# Patient Record
Sex: MALE | Race: BLACK OR AFRICAN AMERICAN | NOT HISPANIC OR LATINO | URBAN - METROPOLITAN AREA
[De-identification: names, ages, dates, MRNs, and addresses within clinical notes are randomized per-mention and may not be internally consistent; named-entity substitution may affect disease eponyms.]

---

## 2018-10-01 ENCOUNTER — EMERGENCY (EMERGENCY)
Facility: HOSPITAL | Age: 43
LOS: 1 days | Discharge: HOME | End: 2018-10-01
Attending: EMERGENCY MEDICINE | Admitting: EMERGENCY MEDICINE
Payer: COMMERCIAL

## 2018-10-01 VITALS
HEART RATE: 86 BPM | WEIGHT: 268.96 LBS | DIASTOLIC BLOOD PRESSURE: 89 MMHG | TEMPERATURE: 99 F | RESPIRATION RATE: 18 BRPM | SYSTOLIC BLOOD PRESSURE: 135 MMHG | OXYGEN SATURATION: 99 %
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
## 2018-10-01 DIAGNOSIS — M25.511 PAIN IN RIGHT SHOULDER: ICD-10-CM

## 2018-10-01 DIAGNOSIS — Z88.8 ALLERGY STATUS TO OTHER DRUGS, MEDICAMENTS AND BIOLOGICAL SUBSTANCES STATUS: ICD-10-CM

## 2018-10-01 DIAGNOSIS — S43.421A SPRAIN OF RIGHT ROTATOR CUFF CAPSULE, INITIAL ENCOUNTER: ICD-10-CM

## 2018-10-01 PROCEDURE — 73030 X-RAY EXAM OF SHOULDER: CPT

## 2018-10-01 PROCEDURE — 73030 X-RAY EXAM OF SHOULDER: CPT | Mod: 26,RT

## 2018-10-01 PROCEDURE — 99283 EMERGENCY DEPT VISIT LOW MDM: CPT | Mod: 25

## 2018-10-01 PROCEDURE — 99283 EMERGENCY DEPT VISIT LOW MDM: CPT

## 2018-10-01 RX ORDER — IBUPROFEN 200 MG
600 TABLET ORAL ONCE
Qty: 0 | Refills: 0 | Status: DISCONTINUED | OUTPATIENT
Start: 2018-10-01 | End: 2018-10-05

## 2018-10-01 NOTE — ED PROVIDER NOTE - MEDICAL DECISION MAKING DETAILS
Pt with shoulder pain, HIGHEST suspician for rotator cuff strain, will xray to screen for fx / dislocation,  PLAN : NSAIDS, sling, rest , ortho f/u.   xray pending.

## 2018-10-01 NOTE — ED ADULT NURSE NOTE - OBJECTIVE STATEMENT
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

## 2018-10-01 NOTE — ED ADULT NURSE NOTE - NSIMPLEMENTINTERV_GEN_ALL_ED
Implemented All Universal Safety Interventions:  [place] to call system. Call bell, personal items and telephone within reach. Instruct patient to call for assistance. Room bathroom lighting operational. Non-slip footwear when patient is off stretcher. Physically safe environment: no spills, clutter or unnecessary equipment. Stretcher in lowest position, wheels locked, appropriate side rails in place.

## 2018-10-01 NOTE — ED PROVIDER NOTE - PHYSICAL EXAMINATION
symmetric shoulders, no deformities, tender to palpation at anterior shoulder, ROM: FROM with difficulty, passively and actively, sensation intact x 3 , motor intact, elbow and wrist also wnl, radial pulse intact, no clavicular or AC joint tenderness, pain at anterior shoulder with internal rotation.

## 2018-10-01 NOTE — ED PROVIDER NOTE - MUSCULOSKELETAL, MLM
Spine appears normal, range of motion is not limited, no muscle or joint tenderness EXCEPTION : see above

## 2018-10-01 NOTE — ED PROVIDER NOTE - OBJECTIVE STATEMENT
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  developed difficulty stirring soup, no radiation, pain constant but worse w movement, no numbness, no prior shoulder injury,

## 2018-10-01 NOTE — ED PROVIDER NOTE - CARE PROVIDER_API CALL
Nelson Frankel), Orthopaedic Surgery  [address] [place] [address]  Phone: [phone]  Fax: [phone]

## 2020-04-25 ENCOUNTER — MESSAGE (OUTPATIENT)
Age: 45
End: 2020-04-25

## 2020-08-15 ENCOUNTER — EMERGENCY (EMERGENCY)
Facility: HOSPITAL | Age: 45
LOS: 1 days | Discharge: HOME | End: 2020-08-15
Attending: EMERGENCY MEDICINE | Admitting: EMERGENCY MEDICINE
Payer: MEDICAID

## 2020-08-15 VITALS
HEIGHT: 74 IN | DIASTOLIC BLOOD PRESSURE: 87 MMHG | RESPIRATION RATE: 18 BRPM | HEART RATE: 95 BPM | OXYGEN SATURATION: 99 % | TEMPERATURE: 99 F | SYSTOLIC BLOOD PRESSURE: 149 MMHG | WEIGHT: 250 LBS

## 2020-08-15 DIAGNOSIS — R50.9 FEVER, UNSPECIFIED: ICD-10-CM

## 2020-08-15 DIAGNOSIS — Z88.8 ALLERGY STATUS TO OTHER DRUGS, MEDICAMENTS AND BIOLOGICAL SUBSTANCES: ICD-10-CM

## 2020-08-15 DIAGNOSIS — Z20.828 CONTACT WITH AND (SUSPECTED) EXPOSURE TO OTHER VIRAL COMMUNICABLE DISEASES: ICD-10-CM

## 2020-08-15 DIAGNOSIS — B34.9 VIRAL INFECTION, UNSPECIFIED: ICD-10-CM

## 2020-08-15 LAB — SARS-COV-2 RNA SPEC QL NAA+PROBE: SIGNIFICANT CHANGE UP

## 2020-08-15 PROCEDURE — 99284 EMERGENCY DEPT VISIT MOD MDM: CPT

## 2020-08-15 PROCEDURE — 99283 EMERGENCY DEPT VISIT LOW MDM: CPT

## 2020-08-15 PROCEDURE — 87635 SARS-COV-2 COVID-19 AMP PRB: CPT

## 2020-08-15 RX ORDER — ACETAMINOPHEN 500 MG
650 TABLET ORAL ONCE
Refills: 0 | Status: COMPLETED | OUTPATIENT
Start: 2020-08-15 | End: 2020-08-15

## 2020-08-15 RX ADMIN — Medication 650 MILLIGRAM(S): at 19:34

## 2020-08-15 NOTE — ED PROVIDER NOTE - NSFOLLOWUPINSTRUCTIONS_ED_ALL_ED_FT
Please see discharge instructions on COVID19, self quarantine, pending results of your testing.  Assume your test may be positive until you hear otherwise.  There is a follow up program that will contact your about results of your test negative or positive.  Please return to the ER if symptoms worsen, significant trouble breathing, or other concerns.    COVID-19 (Coronavirus Disease 2019)    WHAT YOU NEED TO KNOW:    COVID-19 is the disease caused by the 2019 novel (new) coronavirus. It was first found in individuals in a part of [place] in late 2019. The virus is spreading to other countries as infected persons travel. Coronaviruses generally cause respiratory (nose, throat, and lung) infections, such as a cold. They can also cause serious infections such as Middle East respiratory syndrome (MERS) and severe acute respiratory syndrome (SARS). The new virus is related to the SARS coronavirus, so it is officially named SARS coronavirus 2 (SARS-CoV-2).    DISCHARGE INSTRUCTIONS:    If you think you or someone you know may be infected: It is important for anyone who may be infected to get tested right away. Do the following to protect others:     If emergency care is needed, tell the  about the possible infection, or call ahead and tell the emergency department.    Call a healthcare provider to be seen in the office. Anyone who may be infected should not arrive without calling first. The provider will need to protect staff members and other patients.    The person who may be infected needs to wear a medical mask before getting medical care. The mask needs to stay on until the provider says to remove it.    Call your local emergency number (911 in the [place]) or go to the emergency department if: You have signs or symptoms of COVID-19, and any of the following is true:     You traveled within the last 14 days to an area where the virus is active or had close contact with someone who did.    You had close contact within the last 14 days with someone who has a confirmed infection.    Call your doctor if: You do not have signs or symptoms of COVID-19, but any of the following is true:     You traveled within the last 14 days to an area where the virus is active or had close contact with someone who did.    You had close contact within the last 14 days with someone who has a confirmed infection.    You have questions or concerns about your condition or care.    Medicines: You may need any of the following for mild symptoms:     Decongestants help reduce nasal congestion and help you breathe more easily. If you take decongestant pills, they may make you feel restless or cause problems with your sleep. Do not use decongestant sprays for more than a few days.    Cough suppressants help reduce coughing. Ask your healthcare provider which type of cough medicine is best for you.    Acetaminophen decreases pain and fever. It is available without a doctor's order. Ask how much to take and how often to take it. Follow directions. Read the labels of all other medicines you are using to see if they also contain acetaminophen, or ask your doctor or pharmacist. Acetaminophen can cause liver damage if not taken correctly. Do not use more than 4 grams (4,000 milligrams) total of acetaminophen in one day.     Take your medicine as directed. Contact your healthcare provider if you think your medicine is not helping or if you have side effects. Tell him or her if you are allergic to any medicine. Keep a list of the medicines, vitamins, and herbs you take. Include the amounts, and when and why you take them. Bring the list or the pill bottles to follow-up visits. Carry your medicine list with you in case of an emergency.    How the 2019 coronavirus spreads: The virus appears to spread quickly and easily. The following are ways the virus is thought to spread, but more information may be coming:     Droplets are the most common way all coronaviruses spread. The virus can travel in droplets that form when a person talks, coughs, or sneezes. Anyone who breathes in the virus or gets it in his or her eyes can become infected.    An infected person may be able to leave the virus on objects and surfaces. Another person can get the virus on his or her hands by touching the object or surface. Infection happens if the person then touches his or her eyes or mouth with unwashed hands. It is not yet known how long the virus can stay on an object or surface. Evidence shows it may be as long as 9 days at room temperature.    Person-to-person contact may spread the virus. For example, an infected person can spread the virus by shaking hands with someone. At this time, it does not appear that the virus can be passed to a baby during pregnancy or delivery. The virus also does not appear to spread during breastfeeding. If you are pregnant or breastfeeding, talk to your healthcare provider or obstetrician about any concerns you have.    An infected animal may be able to infect a person who touches it. This may happen at live markets or on a farm.    Prevent a 2019 coronavirus infection: Everyone should do the following to prevent getting or spreading the virus:     Wash your hands often. Use soap and water every time you wash your hands. Rub your soapy hands together, lacing your fingers. Use the fingers of one hand to scrub under the nails of the other hand. Wash for at least 20 seconds. Rinse with warm, running water for several seconds. Then dry your hands. Use germ-killing gel if soap and water are not available. Do not touch your eyes or mouth without washing your hands first. Handwashing    Cover a sneeze or cough. Use a tissue that covers your mouth and nose. Throw the tissue away in a trash can right away. Use the bend of your arm if a tissue is not available. Wash your hands well with soap and water or use a hand . Do not stand close to anyone who is sneezing or coughing.    Be careful around others. The best way to prevent infection is to avoid anyone who is infected, but this may be difficult. An infected person may be able to spread the virus before signs or symptoms begin. Until more is known, you may not want to shake hands with others. If you do shake hands, wash your hands or use hand  as soon as possible. You do not need to wear a medical mask if you are well and not caring for an infected person.    Ask about vaccines you may need. No vaccine is available for the new coronavirus. But any infection can affect your immune system. A weakened immune system makes you more vulnerable to the new coronavirus. Until a vaccine against the new virus is developed, do the following:   Get a flu vaccine as soon as recommended each year. The flu vaccine is available starting in September or October. Flu viruses change, so it is important to get a flu vaccine every year.    Talk to your healthcare provider about your vaccine history. Tell him or her if you did not get certain vaccines as a child, or you did not get all recommended doses. Tell him or her if you do not know your vaccine history. He or she will tell you which vaccines you need, and when to get them.    If you have COVID-19: Healthcare providers will give you specific instructions to follow. The following are general guidelines to remind you of how to keep others safe until you are well:     Limit close contact with others. Your healthcare provider will tell you when it is okay to be around others. This may be when you do not have a fever, do not take fever medicine, and have no symptoms. Fluid from your respiratory tract will need to test negative for the virus 2 times at least 24 hours apart. Until then, do the following along with any instructions from your provider:   Only go out of the house for medical appointments. Always call the provider’s office first so he or she can prepare to keep others safe.    Stay at least 6 feet (2 meters) away from others.    Sleep in a different room from others in the house.    Do not shake hands with other people.    Wear a medical mask when others are near you. This can help prevent droplets from spreading the virus when you talk, sneeze, or cough.    Do not share items. Do not share dishes, towels, or other items with anyone. Items need to be washed after you use them.    Do not handle live animals. The virus may be spread to animals, including pets. Until more is known, it is best not to touch, play with, or handle live animals.    Self-care:     Drink more liquids as directed. Liquids will help thin and loosen mucus so you can cough it up. Liquids will also help prevent dehydration. Liquids that help prevent dehydration include water, fruit juice, and broth. Do not drink liquids that contain caffeine. Caffeine can increase your risk for dehydration. Ask your healthcare provider how much liquid to drink each day.    Soothe a sore throat. Gargle with warm salt water. This may help your sore throat feel better. Make salt water by dissolving ¼ teaspoon salt in 1 cup warm water. You may also suck on hard candy or throat lozenges. You may use a sore throat spray.    Use a humidifier or vaporizer. Use a cool mist humidifier or a vaporizer to increase air moisture in your home. This may make it easier for you to breathe and help decrease your cough.    Use saline nasal drops as directed. These help relieve congestion.    Apply petroleum-based jelly around the outside of your nostrils. This can decrease irritation from blowing your nose.    Do not smoke. Nicotine and other chemicals in cigarettes and cigars can make your symptoms worse. They can also cause infections such as bronchitis or pneumonia. Ask your healthcare provider for information if you currently smoke and need help to quit. E-cigarettes or smokeless tobacco still contain nicotine. Talk to your healthcare provider before you use these products.    If you take care of someone who has COVID-19:     Wear a medical mask when you are near the person. This can help protect you from droplets that carry the virus when the person talks, sneezes, or coughs.    Do not allow others to go near the person. No one should come to the person's home unless it is necessary. Keep the room's door shut unless you need to go in or out.    Make sure the person's room has good air flow. You may be able to open the window if the weather allows. An air conditioner can also be turned on to help air move.    Clean items the person uses or touches. Wear disposable gloves to handle the person's laundry. Place the laundry in a plastic bag. Use hot water and soap to wash the person's laundry. Wash eating utensils and other items after the person uses them. Throw your gloves away after you use them.    Clean surfaces often. Use bleach diluted with water or disinfecting wipes. Clean counters, doorknobs, toilet seats, and other surfaces.    Follow up with your doctor as directed: Write down your questions so you remember to ask them during your visits.    For more information:     Centers for Disease Control and Prevention  [address] [place], [place]  Phone: [phone]  Phone: [phone]  Web Address: http://www.cdc.gov

## 2020-08-15 NOTE — ED PROVIDER NOTE - PATIENT PORTAL LINK FT
You can access the FollowMyHealth Patient Portal offered by [facility] by registering at the following website: [email or web address]. By joining [email or web address]’s FollowMyHealth portal, you will also be able to view your health information using other applications (apps) compatible with our system.

## 2020-08-15 NOTE — ED PROVIDER NOTE - ENMT, MLM
Airway patent, Nasal mucosa clear. Mouth with normal mucosa. Throat has no vesicles, no oropharyngeal exudates and uvula is midline.  No lad.  No vesicles.

## 2020-08-15 NOTE — ED ADULT NURSE NOTE - CHPI ED NUR DURATION
day(s)/x 3 days; denies known exposure to positive covid cases; recent travel; Patient well-appearing, Mask applied pta

## 2020-08-15 NOTE — ED ADULT TRIAGE NOTE - CHIEF COMPLAINT QUOTE
pt arriving to ED for c/c  fever, congestion and sore throat x 3 days. Denies n/v/d, chills, cough, CP or SOB. took Advil 2 days ago for fever. Afebrile at triage.

## 2020-08-15 NOTE — ED PROVIDER NOTE - OBJECTIVE STATEMENT
here with 3 days of body aches, fever, sore throat.  Reports temp 102 at home. No cough, sob, n/v/d.  Taking mucinex and vitamins for symptoms.  Denies sick contacts, recent travel, prior covid infection

## 2020-08-15 NOTE — ED ADULT NURSE NOTE - CHPI ED NUR SYMPTOMS NEG
no nausea/no numbness/no syncope/no chills/no loss of consciousness/no bleeding gums/no weakness/no fever/no vomiting

## 2020-08-15 NOTE — ED PROVIDER NOTE - CLINICAL SUMMARY MEDICAL DECISION MAKING FREE TEXT BOX
patient with URI/viral syndrome symptoms clinically.  otherwise non-toxic and well-appearing in no respiratory distress.  lungs clear, oxygen saturation wnl.  suspect possible covid19 infection.  covid testing sent.  patient has been given education on coronavirus and testing education.  patient has been advised to self-quarantine pending results and given return precautions for any concerning or worsening of their symptoms

## 2020-08-16 PROBLEM — M10.9 GOUT, UNSPECIFIED: Chronic | Status: ACTIVE | Noted: 2018-10-01

## 2020-08-26 ENCOUNTER — EMERGENCY (EMERGENCY)
Facility: HOSPITAL | Age: 45
LOS: 1 days | Discharge: HOME | End: 2020-08-26
Attending: EMERGENCY MEDICINE | Admitting: EMERGENCY MEDICINE
Payer: COMMERCIAL

## 2020-08-26 VITALS
TEMPERATURE: 99 F | DIASTOLIC BLOOD PRESSURE: 91 MMHG | OXYGEN SATURATION: 98 % | HEART RATE: 72 BPM | RESPIRATION RATE: 18 BRPM | HEIGHT: 74 IN | SYSTOLIC BLOOD PRESSURE: 170 MMHG

## 2020-08-26 VITALS
TEMPERATURE: 98 F | DIASTOLIC BLOOD PRESSURE: 83 MMHG | SYSTOLIC BLOOD PRESSURE: 158 MMHG | OXYGEN SATURATION: 98 % | RESPIRATION RATE: 16 BRPM | HEART RATE: 63 BPM

## 2020-08-26 DIAGNOSIS — R07.89 OTHER CHEST PAIN: ICD-10-CM

## 2020-08-26 DIAGNOSIS — Z88.8 ALLERGY STATUS TO OTHER DRUGS, MEDICAMENTS AND BIOLOGICAL SUBSTANCES STATUS: ICD-10-CM

## 2020-08-26 DIAGNOSIS — Z20.828 CONTACT WITH AND (SUSPECTED) EXPOSURE TO OTHER VIRAL COMMUNICABLE DISEASES: ICD-10-CM

## 2020-08-26 DIAGNOSIS — R00.2 PALPITATIONS: ICD-10-CM

## 2020-08-26 LAB
ALBUMIN SERPL ELPH-MCNC: 3.4 G/DL — SIGNIFICANT CHANGE UP (ref 3.3–5)
ALP SERPL-CCNC: 77 U/L — SIGNIFICANT CHANGE UP (ref 40–120)
ALT FLD-CCNC: 21 U/L — SIGNIFICANT CHANGE UP (ref 10–45)
ANION GAP SERPL CALC-SCNC: 8 MMOL/L — SIGNIFICANT CHANGE UP (ref 5–17)
ANION GAP SERPL CALC-SCNC: 9 MMOL/L — SIGNIFICANT CHANGE UP (ref 5–17)
APTT BLD: 32.5 SEC — SIGNIFICANT CHANGE UP (ref 27.5–35.5)
AST SERPL-CCNC: 27 U/L — SIGNIFICANT CHANGE UP (ref 10–40)
BASOPHILS # BLD AUTO: 0.04 K/UL — SIGNIFICANT CHANGE UP (ref 0–0.2)
BASOPHILS NFR BLD AUTO: 0.3 % — SIGNIFICANT CHANGE UP (ref 0–2)
BILIRUB SERPL-MCNC: 0.4 MG/DL — SIGNIFICANT CHANGE UP (ref 0.2–1.2)
BUN SERPL-MCNC: 21 MG/DL — SIGNIFICANT CHANGE UP (ref 7–23)
BUN SERPL-MCNC: 21 MG/DL — SIGNIFICANT CHANGE UP (ref 7–23)
CALCIUM SERPL-MCNC: 9.1 MG/DL — SIGNIFICANT CHANGE UP (ref 8.4–10.5)
CALCIUM SERPL-MCNC: 9.3 MG/DL — SIGNIFICANT CHANGE UP (ref 8.4–10.5)
CHLORIDE SERPL-SCNC: 107 MMOL/L — SIGNIFICANT CHANGE UP (ref 96–108)
CHLORIDE SERPL-SCNC: 108 MMOL/L — SIGNIFICANT CHANGE UP (ref 96–108)
CK MB CFR SERPL CALC: 3.5 NG/ML — SIGNIFICANT CHANGE UP (ref 0–6.7)
CK SERPL-CCNC: 458 U/L — HIGH (ref 30–200)
CO2 SERPL-SCNC: 25 MMOL/L — SIGNIFICANT CHANGE UP (ref 22–31)
CO2 SERPL-SCNC: 26 MMOL/L — SIGNIFICANT CHANGE UP (ref 22–31)
CREAT SERPL-MCNC: 1.78 MG/DL — HIGH (ref 0.5–1.3)
CREAT SERPL-MCNC: 1.82 MG/DL — HIGH (ref 0.5–1.3)
EOSINOPHIL # BLD AUTO: 0.08 K/UL — SIGNIFICANT CHANGE UP (ref 0–0.5)
EOSINOPHIL NFR BLD AUTO: 0.7 % — SIGNIFICANT CHANGE UP (ref 0–6)
GLUCOSE SERPL-MCNC: 109 MG/DL — HIGH (ref 70–99)
GLUCOSE SERPL-MCNC: 116 MG/DL — HIGH (ref 70–99)
HCT VFR BLD CALC: 37.6 % — LOW (ref 39–50)
HGB BLD-MCNC: 12.7 G/DL — LOW (ref 13–17)
IMM GRANULOCYTES NFR BLD AUTO: 0.5 % — SIGNIFICANT CHANGE UP (ref 0–1.5)
INR BLD: 1.1 — SIGNIFICANT CHANGE UP (ref 0.88–1.16)
LYMPHOCYTES # BLD AUTO: 19.9 % — SIGNIFICANT CHANGE UP (ref 13–44)
LYMPHOCYTES # BLD AUTO: 2.39 K/UL — SIGNIFICANT CHANGE UP (ref 1–3.3)
MCHC RBC-ENTMCNC: 29.9 PG — SIGNIFICANT CHANGE UP (ref 27–34)
MCHC RBC-ENTMCNC: 33.8 GM/DL — SIGNIFICANT CHANGE UP (ref 32–36)
MCV RBC AUTO: 88.5 FL — SIGNIFICANT CHANGE UP (ref 80–100)
MONOCYTES # BLD AUTO: 1.16 K/UL — HIGH (ref 0–0.9)
MONOCYTES NFR BLD AUTO: 9.7 % — SIGNIFICANT CHANGE UP (ref 2–14)
NEUTROPHILS # BLD AUTO: 8.25 K/UL — HIGH (ref 1.8–7.4)
NEUTROPHILS NFR BLD AUTO: 68.9 % — SIGNIFICANT CHANGE UP (ref 43–77)
NRBC # BLD: 0 /100 WBCS — SIGNIFICANT CHANGE UP (ref 0–0)
PLATELET # BLD AUTO: 257 K/UL — SIGNIFICANT CHANGE UP (ref 150–400)
POTASSIUM SERPL-MCNC: 4 MMOL/L — SIGNIFICANT CHANGE UP (ref 3.5–5.3)
POTASSIUM SERPL-MCNC: 4.6 MMOL/L — SIGNIFICANT CHANGE UP (ref 3.5–5.3)
POTASSIUM SERPL-SCNC: 4 MMOL/L — SIGNIFICANT CHANGE UP (ref 3.5–5.3)
POTASSIUM SERPL-SCNC: 4.6 MMOL/L — SIGNIFICANT CHANGE UP (ref 3.5–5.3)
PROT SERPL-MCNC: 7.4 G/DL — SIGNIFICANT CHANGE UP (ref 6–8.3)
PROTHROM AB SERPL-ACNC: 13.1 SEC — SIGNIFICANT CHANGE UP (ref 10.6–13.6)
RBC # BLD: 4.25 M/UL — SIGNIFICANT CHANGE UP (ref 4.2–5.8)
RBC # FLD: 12.4 % — SIGNIFICANT CHANGE UP (ref 10.3–14.5)
SARS-COV-2 RNA SPEC QL NAA+PROBE: SIGNIFICANT CHANGE UP
SODIUM SERPL-SCNC: 141 MMOL/L — SIGNIFICANT CHANGE UP (ref 135–145)
SODIUM SERPL-SCNC: 142 MMOL/L — SIGNIFICANT CHANGE UP (ref 135–145)
TROPONIN T SERPL-MCNC: 0.01 NG/ML — SIGNIFICANT CHANGE UP (ref 0–0.01)
TROPONIN T SERPL-MCNC: 0.02 NG/ML — HIGH (ref 0–0.01)
WBC # BLD: 11.98 K/UL — HIGH (ref 3.8–10.5)
WBC # FLD AUTO: 11.98 K/UL — HIGH (ref 3.8–10.5)

## 2020-08-26 PROCEDURE — 85610 PROTHROMBIN TIME: CPT

## 2020-08-26 PROCEDURE — 85730 THROMBOPLASTIN TIME PARTIAL: CPT

## 2020-08-26 PROCEDURE — 80053 COMPREHEN METABOLIC PANEL: CPT

## 2020-08-26 PROCEDURE — 99284 EMERGENCY DEPT VISIT MOD MDM: CPT | Mod: 25

## 2020-08-26 PROCEDURE — 93010 ELECTROCARDIOGRAM REPORT: CPT | Mod: 76

## 2020-08-26 PROCEDURE — 87635 SARS-COV-2 COVID-19 AMP PRB: CPT

## 2020-08-26 PROCEDURE — 84484 ASSAY OF TROPONIN QUANT: CPT

## 2020-08-26 PROCEDURE — 36415 COLL VENOUS BLD VENIPUNCTURE: CPT

## 2020-08-26 PROCEDURE — 80048 BASIC METABOLIC PNL TOTAL CA: CPT

## 2020-08-26 PROCEDURE — 71045 X-RAY EXAM CHEST 1 VIEW: CPT | Mod: 26

## 2020-08-26 PROCEDURE — 71045 X-RAY EXAM CHEST 1 VIEW: CPT

## 2020-08-26 PROCEDURE — 96360 HYDRATION IV INFUSION INIT: CPT

## 2020-08-26 PROCEDURE — 99285 EMERGENCY DEPT VISIT HI MDM: CPT | Mod: 25

## 2020-08-26 PROCEDURE — 83735 ASSAY OF MAGNESIUM: CPT

## 2020-08-26 PROCEDURE — 82550 ASSAY OF CK (CPK): CPT

## 2020-08-26 PROCEDURE — 93005 ELECTROCARDIOGRAM TRACING: CPT | Mod: 76

## 2020-08-26 PROCEDURE — 82553 CREATINE MB FRACTION: CPT

## 2020-08-26 PROCEDURE — 85025 COMPLETE CBC W/AUTO DIFF WBC: CPT

## 2020-08-26 RX ORDER — SODIUM CHLORIDE 9 MG/ML
1000 INJECTION INTRAMUSCULAR; INTRAVENOUS; SUBCUTANEOUS ONCE
Refills: 0 | Status: COMPLETED | OUTPATIENT
Start: 2020-08-26 | End: 2020-08-26

## 2020-08-26 RX ADMIN — SODIUM CHLORIDE 1000 MILLILITER(S): 9 INJECTION INTRAMUSCULAR; INTRAVENOUS; SUBCUTANEOUS at 07:38

## 2020-08-26 RX ADMIN — SODIUM CHLORIDE 1000 MILLILITER(S): 9 INJECTION INTRAMUSCULAR; INTRAVENOUS; SUBCUTANEOUS at 06:38

## 2020-08-26 NOTE — ED ADULT NURSE REASSESSMENT NOTE - NS ED NURSE REASSESS COMMENT FT1
BERNADINE Penny at bedside speaking with pt.
interventions as noted, jose. well, Radiology Tech at bedside for PCXR
pt. ambulated to restroom with steady gait noted, awaiting admit
report received from [facility] ARNALDO parson. lab results reviewed. pending repeat labs. shiela any complaints at this time. VSS.
troponin was positive as noted, d/w Dale Rivera was obtained and sent for admission as noted, jose. well, pt. and spouse UTD on poc, with understanding verbalized, assessment on-going, pending further orders

## 2020-08-26 NOTE — ED PROVIDER NOTE - ATTENDING CONTRIBUTION TO CARE
45M no sig pmh p/w episodic palpitations x1d, nonspect chest discomfort PTA. Vague chest discomfort. but asymptomatic in ED. Nontoxic, no sig distress, vss. Concenr palpitations, less likely ACS, trop min elevated w/ hx renal d/o, discussed w/ Cards recs trend trop/bnp - if stable/downtrending recs outpt f/u. Doubt PE, consider arrythmia though no acute ischemic ekg changes. Doubt dissection. Feeling much improved, no acute life threatening illness. Pt seeking discharge.  Given return precautions. I have discussed the discharge plan with the Pt who agrees with the plan, as discussed.  They understand ED dx is a preliminary dx often based on limited information and that the Pt must adhere to the follow-up plan as discussed.  The Pt understands that if the symptoms worsen or if Rx'd meds do not have the desired/planned effect that the patient may return to the ED at any time for further evaluation and treatment.

## 2020-08-26 NOTE — ED PROVIDER NOTE - CLINICAL SUMMARY MEDICAL DECISION MAKING FREE TEXT BOX
44 yo m with episodic palpitations tonight, vague chest discomfort. but asymptomatic in ED. Normal EKG, first trop mildly elevated in setting of elevated creatinine.  CXR without infiltrate.  Fairly low susp for ACS, especially with neg stress test last year.  Will d/w cardiology.

## 2020-08-26 NOTE — ED PROVIDER NOTE - NS ED ROS FT
CONSTITUTIONAL: No fever, chills, or weakness  NEURO: No headache, no dizziness, no syncope; No focal weakness/tingling/numbness  EYES: No visual changes  ENT: Seen in ED 11 days ago for fever & sore throat, resolved a few days later.  PULM: No cough or dyspnea  CV: HPI  GI: No abdominal pain, vomiting, or diarrhea; no blood in stool or melena.  : No dysuria, hematuria, frequency  MSK: No neck pain or back pain, no joint pain  SKIN: no rash or unusual bruising

## 2020-08-26 NOTE — ED PROVIDER NOTE - PROGRESS NOTE DETAILS
d/w cardiology fellow: recommends adding on CK total and CKMB to initial labs, and getting a repeat set of cardiac enzymes and EKG.  If rising or EKG change, then will admit. Spoke with patient, currently asymptomatic. PT is aware of elevated Cr, to f/u with PCP/Nephrologist. Trop neg x 2. Pt aware to see cardiologist for possible Holter if needed

## 2020-08-26 NOTE — ED ADULT NURSE NOTE - OBJECTIVE STATEMENT
Received ambulatory with chief complaints of chest pain- woke from sleep. AOX4, speaking full sentences, family @  bedside.  +PERRLA.  Resps even and nonlabored. Moves all extremities. No edema noted in BLE, cap refill less than 3 seconds. No obvious trauma/injury/deformity noted. Patient oriented to ED area. All needs attended. POC reviewed. EKG done. Hourly rounding in progress.

## 2020-08-26 NOTE — ED PROVIDER NOTE - PHYSICAL EXAMINATION
CONSTITUTIONAL: WD,WN. NAD.    SKIN: Normal color and turgor. No rash.    HEAD: NC/AT.  EYES: Conjunctiva clear. EOMI. PERRL.    ENT: Airway patent, OP without erythema, tonsillar swelling or exudate; uvula midline without swelling. Nasal mucosa clear, no rhinorrhea.   RESPIRATORY:  Breathing non-labored. No retractions or accessory muscle use.  Lungs CTA bilat.  CARDIOVASCULAR:  RRR, S1S2. No M/R/G.    No cervical bruits.  Strong, symmetric peripheral pulses bilaterally.   GI:  Abdomen soft, nontender.    MSK: Neck supple with painless ROM.  No lower extremity edema or calf tenderness.  No joint swelling or ROM limitation.  NEURO: Alert and oriented; CN II-XII grossly intact. Speech clear. 5/5 strength in all extremities.  Good balance. Steady gait.

## 2020-08-26 NOTE — ED PROVIDER NOTE - OBJECTIVE STATEMENT
46 yo m without significant PMHx c/o palpitations.  He had an episode of palpitations while watching TV around 7 pm tonight, lasted a couple of hours and gradually resolved; no chest pain or SOB.  Went to bed around 11 pm, and was awakened again around 3 am with palpitations and discomfort "mild pain" in the chest.  It lasted about 45 minutes before it started to resolve.  Says his heart was beating fast and hard, and it felt irregular.  No SOB, sweating, nausea/vomiting, weakness/lightheadedness.  No hx of fainting in his lifetime.  No hx of heart disease, DM, HTN, HLD.     PMHx: gout  PSHx: appendectomy, knee arthroscopy  Meds: allopurinol (QOD)  Allergies: allopurinol (mild rash, still takes the medication)  Social: never smoker, no cocaine/other drug use, no herbal meds, no appetite/dietary meds, 1 cup tea each morning  Family: father  age 67 from suspected heart attack this past July.  no hx of known premature heart disease or arrhythmia in family. 44 yo m without significant PMHx c/o palpitations.  He had an episode of palpitations while watching TV around 7 pm tonight, lasted a couple of hours and gradually resolved; no chest pain or SOB.  Went to bed around 11 pm, and was awakened again around 3 am with palpitations and vague discomfort in the chest.  It lasted about 45 minutes before it started to resolve.  Says his heart was beating fast and hard, and it felt irregular.  No SOB, sweating, nausea/vomiting, weakness/lightheadedness.  No hx of fainting in his lifetime.  No hx of heart disease, DM, HTN, HLD.  Saw a cardiologist last year, says he had negative stress test and echocardiogram.    PMHx: gout  PSHx: appendectomy, knee arthroscopy  Meds: allopurinol (QOD)  Allergies: allopurinol (mild rash, still takes the medication)  Social: never smoker, no cocaine/other drug use, no herbal meds, no appetite/dietary meds, 1 cup tea each morning  Family: father  age 67 from suspected heart attack this past July.  no hx of known premature heart disease or arrhythmia in family.

## 2020-08-26 NOTE — ED PROVIDER NOTE - PATIENT PORTAL LINK FT
You can access the FollowMyHealth Patient Portal offered by [facility] by registering at the following website: [email or web address]. By joining [employer]’s FollowMyHealth portal, you will also be able to view your health information using other applications (apps) compatible with our system.

## 2020-08-26 NOTE — ED PROVIDER NOTE - NSFOLLOWUPINSTRUCTIONS_ED_ALL_ED_FT
ArabicBosnianCanarussan FrenchCra[facility] Hospitallish[facility] ChineseVietnamese    Palpitations  Palpitations are feelings that your heartbeat is irregular or is faster than normal. It may feel like your heart is fluttering or skipping a beat. Palpitations are usually not a serious problem. They may be caused by many things, including smoking, caffeine, alcohol, stress, and certain medicines or drugs. Most causes of palpitations are not serious. However, some palpitations can be a sign of a serious problem. You may need further tests to rule out serious medical problems.  Follow these instructions at home:      Pay attention to any changes in your condition. Take these actions to help manage your symptoms:  Eating and drinking     Avoid foods and drinks that may cause palpitations. These may include:  Caffeinated coffee, tea, soft drinks, diet pills, and energy drinks.Chocolate.Alcohol.Lifestyle     Take steps to reduce your stress and anxiety. Things that can help you relax include:  Yoga.Mind-body activities, such as deep breathing, meditation, or using words and images to create positive thoughts (guided imagery).Physical activity, such as swimming, jogging, or walking. Tell your health care provider if your palpitations increase with activity. If you have chest pain or shortness of breath with activity, do not continue the activity until you are seen by your health care provider.Biofeedback. This is a method that helps you learn to use your mind to control things in your body, such as your heartbeat.Do not use drugs, including cocaine or ecstasy. Do not use marijuana.Get plenty of rest and sleep. Keep a regular bed time.General instructions     Take over-the-counter and prescription medicines only as told by your health care provider.Do not use any products that contain nicotine or tobacco, such as cigarettes and e-cigarettes. If you need help quitting, ask your health care provider.Keep all follow-up visits as told by your health care provider. This is important. These may include visits for further testing if palpitations do not go away or get worse.Contact a health care provider if you:  Continue to have a fast or irregular heartbeat after 24 hours.Notice that your palpitations occur more often.Get help right away if you:  Have chest pain or shortness of breath.Have a severe headache.Feel dizzy or you faint.Summary  Palpitations are feelings that your heartbeat is irregular or is faster than normal. It may feel like your heart is fluttering or skipping a beat.Palpitations may be caused by many things, including smoking, caffeine, alcohol, stress, certain medicines, and drugs.Although most causes of palpitations are not serious, some causes can be a sign of a serious medical problem.Get help right away if you faint or have chest pain, shortness of breath, a severe headache, or dizziness.This information is not intended to replace advice given to you by your health care provider. Make sure you discuss any questions you have with your health care provider.    Document Released: 12/15/2001 Document Revised: 01/30/2019 Document Reviewed: 01/30/2019  Elsevier Patient Education © 2020 [employer]

## 2020-08-27 ENCOUNTER — INPATIENT (INPATIENT)
Facility: HOSPITAL | Age: 45
LOS: 4 days | Discharge: HOME | DRG: 699 | End: 2020-09-01
Attending: HOSPITALIST | Admitting: HOSPITALIST
Payer: COMMERCIAL

## 2020-08-27 VITALS
WEIGHT: 259.93 LBS | DIASTOLIC BLOOD PRESSURE: 91 MMHG | TEMPERATURE: 98 F | HEIGHT: 74 IN | HEART RATE: 69 BPM | RESPIRATION RATE: 18 BRPM | SYSTOLIC BLOOD PRESSURE: 170 MMHG | OXYGEN SATURATION: 98 %

## 2020-08-27 DIAGNOSIS — N28.9 DISORDER OF KIDNEY AND URETER, UNSPECIFIED: ICD-10-CM

## 2020-08-27 DIAGNOSIS — I10 ESSENTIAL (PRIMARY) HYPERTENSION: ICD-10-CM

## 2020-08-27 DIAGNOSIS — D72.829 ELEVATED WHITE BLOOD CELL COUNT, UNSPECIFIED: ICD-10-CM

## 2020-08-27 DIAGNOSIS — Z29.9 ENCOUNTER FOR PROPHYLACTIC MEASURES, UNSPECIFIED: ICD-10-CM

## 2020-08-27 DIAGNOSIS — R79.89 OTHER SPECIFIED ABNORMAL FINDINGS OF BLOOD CHEMISTRY: ICD-10-CM

## 2020-08-27 DIAGNOSIS — R00.2 PALPITATIONS: ICD-10-CM

## 2020-08-27 DIAGNOSIS — R07.9 CHEST PAIN, UNSPECIFIED: ICD-10-CM

## 2020-08-27 DIAGNOSIS — D64.9 ANEMIA, UNSPECIFIED: ICD-10-CM

## 2020-08-27 DIAGNOSIS — M10.9 GOUT, UNSPECIFIED: ICD-10-CM

## 2020-08-27 LAB
A1C WITH ESTIMATED AVERAGE GLUCOSE RESULT: 5.9 % — HIGH (ref 4–5.6)
ALBUMIN SERPL ELPH-MCNC: 3.7 G/DL — SIGNIFICANT CHANGE UP (ref 3.3–5)
ALP SERPL-CCNC: 74 U/L — SIGNIFICANT CHANGE UP (ref 40–120)
ALT FLD-CCNC: 23 U/L — SIGNIFICANT CHANGE UP (ref 10–45)
ANION GAP SERPL CALC-SCNC: 11 MMOL/L — SIGNIFICANT CHANGE UP (ref 5–17)
APPEARANCE UR: CLEAR — SIGNIFICANT CHANGE UP
APTT BLD: 31.8 SEC — SIGNIFICANT CHANGE UP (ref 27.5–35.5)
AST SERPL-CCNC: 31 U/L — SIGNIFICANT CHANGE UP (ref 10–40)
B PERT DNA SPEC QL NAA+PROBE: SIGNIFICANT CHANGE UP
BASOPHILS # BLD AUTO: 0.05 K/UL — SIGNIFICANT CHANGE UP (ref 0–0.2)
BASOPHILS NFR BLD AUTO: 0.4 % — SIGNIFICANT CHANGE UP (ref 0–2)
BILIRUB SERPL-MCNC: 1.1 MG/DL — SIGNIFICANT CHANGE UP (ref 0.2–1.2)
BILIRUB UR-MCNC: NEGATIVE — SIGNIFICANT CHANGE UP
BUN SERPL-MCNC: 22 MG/DL — SIGNIFICANT CHANGE UP (ref 7–23)
C PNEUM DNA SPEC QL NAA+PROBE: SIGNIFICANT CHANGE UP
CALCIUM SERPL-MCNC: 9.1 MG/DL — SIGNIFICANT CHANGE UP (ref 8.4–10.5)
CHLORIDE SERPL-SCNC: 105 MMOL/L — SIGNIFICANT CHANGE UP (ref 96–108)
CK MB CFR SERPL CALC: 4.4 NG/ML — SIGNIFICANT CHANGE UP (ref 0–6.7)
CK SERPL-CCNC: 580 U/L — HIGH (ref 30–200)
CO2 SERPL-SCNC: 23 MMOL/L — SIGNIFICANT CHANGE UP (ref 22–31)
COLOR SPEC: YELLOW — SIGNIFICANT CHANGE UP
CREAT SERPL-MCNC: 1.91 MG/DL — HIGH (ref 0.5–1.3)
CRP SERPL-MCNC: 1.88 MG/DL — HIGH (ref 0–0.4)
D DIMER BLD IA.RAPID-MCNC: 1684 NG/ML DDU — HIGH
DIFF PNL FLD: ABNORMAL
EOSINOPHIL # BLD AUTO: 0.07 K/UL — SIGNIFICANT CHANGE UP (ref 0–0.5)
EOSINOPHIL NFR BLD AUTO: 0.6 % — SIGNIFICANT CHANGE UP (ref 0–6)
ERYTHROCYTE [SEDIMENTATION RATE] IN BLOOD: 40 MM/HR — HIGH
FLUAV H1 2009 PAND RNA SPEC QL NAA+PROBE: SIGNIFICANT CHANGE UP
FLUAV H1 RNA SPEC QL NAA+PROBE: SIGNIFICANT CHANGE UP
FLUAV H3 RNA SPEC QL NAA+PROBE: SIGNIFICANT CHANGE UP
FLUAV SUBTYP SPEC NAA+PROBE: SIGNIFICANT CHANGE UP
FLUBV RNA SPEC QL NAA+PROBE: SIGNIFICANT CHANGE UP
GLUCOSE SERPL-MCNC: 96 MG/DL — SIGNIFICANT CHANGE UP (ref 70–99)
GLUCOSE UR QL: NEGATIVE — SIGNIFICANT CHANGE UP
HADV DNA SPEC QL NAA+PROBE: SIGNIFICANT CHANGE UP
HCOV PNL SPEC NAA+PROBE: SIGNIFICANT CHANGE UP
HCT VFR BLD CALC: 36.3 % — LOW (ref 39–50)
HGB BLD-MCNC: 12.3 G/DL — LOW (ref 13–17)
HIV 1+2 AB+HIV1 P24 AG SERPL QL IA: SIGNIFICANT CHANGE UP
HMPV RNA SPEC QL NAA+PROBE: SIGNIFICANT CHANGE UP
HPIV1 RNA SPEC QL NAA+PROBE: SIGNIFICANT CHANGE UP
HPIV2 RNA SPEC QL NAA+PROBE: SIGNIFICANT CHANGE UP
HPIV3 RNA SPEC QL NAA+PROBE: SIGNIFICANT CHANGE UP
HPIV4 RNA SPEC QL NAA+PROBE: SIGNIFICANT CHANGE UP
IMM GRANULOCYTES NFR BLD AUTO: 0.4 % — SIGNIFICANT CHANGE UP (ref 0–1.5)
INR BLD: 1.14 — SIGNIFICANT CHANGE UP (ref 0.88–1.16)
KETONES UR-MCNC: NEGATIVE — SIGNIFICANT CHANGE UP
LACTATE SERPL-SCNC: 1.2 MMOL/L — SIGNIFICANT CHANGE UP (ref 0.5–2)
LEUKOCYTE ESTERASE UR-ACNC: NEGATIVE — SIGNIFICANT CHANGE UP
LYMPHOCYTES # BLD AUTO: 2.25 K/UL — SIGNIFICANT CHANGE UP (ref 1–3.3)
LYMPHOCYTES # BLD AUTO: 20 % — SIGNIFICANT CHANGE UP (ref 13–44)
MCHC RBC-ENTMCNC: 29.6 PG — SIGNIFICANT CHANGE UP (ref 27–34)
MCHC RBC-ENTMCNC: 33.9 GM/DL — SIGNIFICANT CHANGE UP (ref 32–36)
MCV RBC AUTO: 87.3 FL — SIGNIFICANT CHANGE UP (ref 80–100)
MONOCYTES # BLD AUTO: 1.21 K/UL — HIGH (ref 0–0.9)
MONOCYTES NFR BLD AUTO: 10.8 % — SIGNIFICANT CHANGE UP (ref 2–14)
NEUTROPHILS # BLD AUTO: 7.61 K/UL — HIGH (ref 1.8–7.4)
NEUTROPHILS NFR BLD AUTO: 67.8 % — SIGNIFICANT CHANGE UP (ref 43–77)
NITRITE UR-MCNC: NEGATIVE — SIGNIFICANT CHANGE UP
NRBC # BLD: 0 /100 WBCS — SIGNIFICANT CHANGE UP (ref 0–0)
PH UR: 6 — SIGNIFICANT CHANGE UP (ref 5–8)
PLATELET # BLD AUTO: 234 K/UL — SIGNIFICANT CHANGE UP (ref 150–400)
POTASSIUM SERPL-MCNC: 4.5 MMOL/L — SIGNIFICANT CHANGE UP (ref 3.5–5.3)
POTASSIUM SERPL-SCNC: 4.5 MMOL/L — SIGNIFICANT CHANGE UP (ref 3.5–5.3)
PROT SERPL-MCNC: 8.2 G/DL — SIGNIFICANT CHANGE UP (ref 6–8.3)
PROT UR-MCNC: >=300 MG/DL
PROTHROM AB SERPL-ACNC: 13.6 SEC — SIGNIFICANT CHANGE UP (ref 10.6–13.6)
RAPID RVP RESULT: SIGNIFICANT CHANGE UP
RBC # BLD: 4.16 M/UL — LOW (ref 4.2–5.8)
RBC # FLD: 12.3 % — SIGNIFICANT CHANGE UP (ref 10.3–14.5)
RSV RNA SPEC QL NAA+PROBE: SIGNIFICANT CHANGE UP
RV+EV RNA SPEC QL NAA+PROBE: SIGNIFICANT CHANGE UP
SARS-COV-2 RNA SPEC QL NAA+PROBE: SIGNIFICANT CHANGE UP
SODIUM SERPL-SCNC: 139 MMOL/L — SIGNIFICANT CHANGE UP (ref 135–145)
SP GR SPEC: >=1.03 — SIGNIFICANT CHANGE UP (ref 1–1.03)
TROPONIN T SERPL-MCNC: 0.03 NG/ML — HIGH (ref 0–0.01)
TSH SERPL-MCNC: 1.47 UIU/ML — SIGNIFICANT CHANGE UP (ref 0.35–4.94)
UROBILINOGEN FLD QL: 0.2 E.U./DL — SIGNIFICANT CHANGE UP
WBC # BLD: 11.23 K/UL — HIGH (ref 3.8–10.5)
WBC # FLD AUTO: 11.23 K/UL — HIGH (ref 3.8–10.5)

## 2020-08-27 PROCEDURE — 76700 US EXAM ABDOM COMPLETE: CPT | Mod: 26,59

## 2020-08-27 PROCEDURE — 99285 EMERGENCY DEPT VISIT HI MDM: CPT

## 2020-08-27 PROCEDURE — 71250 CT THORAX DX C-: CPT | Mod: 26

## 2020-08-27 PROCEDURE — 93970 EXTREMITY STUDY: CPT | Mod: 26

## 2020-08-27 PROCEDURE — 93976 VASCULAR STUDY: CPT | Mod: 26,59

## 2020-08-27 PROCEDURE — 71045 X-RAY EXAM CHEST 1 VIEW: CPT | Mod: 26

## 2020-08-27 PROCEDURE — 93306 TTE W/DOPPLER COMPLETE: CPT | Mod: 26

## 2020-08-27 PROCEDURE — 76857 US EXAM PELVIC LIMITED: CPT | Mod: 26

## 2020-08-27 PROCEDURE — 93010 ELECTROCARDIOGRAM REPORT: CPT

## 2020-08-27 PROCEDURE — 99223 1ST HOSP IP/OBS HIGH 75: CPT | Mod: GC

## 2020-08-27 RX ORDER — LABETALOL HCL 100 MG
100 TABLET ORAL ONCE
Refills: 0 | Status: COMPLETED | OUTPATIENT
Start: 2020-08-27 | End: 2020-08-27

## 2020-08-27 RX ORDER — ASPIRIN/CALCIUM CARB/MAGNESIUM 324 MG
325 TABLET ORAL ONCE
Refills: 0 | Status: COMPLETED | OUTPATIENT
Start: 2020-08-27 | End: 2020-08-27

## 2020-08-27 RX ORDER — LABETALOL HCL 100 MG
100 TABLET ORAL
Refills: 0 | Status: DISCONTINUED | OUTPATIENT
Start: 2020-08-27 | End: 2020-08-27

## 2020-08-27 RX ORDER — AMLODIPINE BESYLATE 2.5 MG/1
5 TABLET ORAL EVERY 24 HOURS
Refills: 0 | Status: DISCONTINUED | OUTPATIENT
Start: 2020-08-28 | End: 2020-08-28

## 2020-08-27 RX ORDER — SODIUM CHLORIDE 9 MG/ML
1000 INJECTION INTRAMUSCULAR; INTRAVENOUS; SUBCUTANEOUS
Refills: 0 | Status: DISCONTINUED | OUTPATIENT
Start: 2020-08-27 | End: 2020-08-27

## 2020-08-27 RX ORDER — ENOXAPARIN SODIUM 100 MG/ML
40 INJECTION SUBCUTANEOUS EVERY 24 HOURS
Refills: 0 | Status: DISCONTINUED | OUTPATIENT
Start: 2020-08-27 | End: 2020-08-28

## 2020-08-27 RX ORDER — AZITHROMYCIN 500 MG/1
500 TABLET, FILM COATED ORAL ONCE
Refills: 0 | Status: COMPLETED | OUTPATIENT
Start: 2020-08-27 | End: 2020-08-27

## 2020-08-27 RX ORDER — CEFTRIAXONE 500 MG/1
1000 INJECTION, POWDER, FOR SOLUTION INTRAMUSCULAR; INTRAVENOUS ONCE
Refills: 0 | Status: COMPLETED | OUTPATIENT
Start: 2020-08-27 | End: 2020-08-27

## 2020-08-27 RX ADMIN — SODIUM CHLORIDE 125 MILLILITER(S): 9 INJECTION INTRAMUSCULAR; INTRAVENOUS; SUBCUTANEOUS at 13:02

## 2020-08-27 RX ADMIN — Medication 100 MILLIGRAM(S): at 19:37

## 2020-08-27 RX ADMIN — Medication 325 MILLIGRAM(S): at 13:02

## 2020-08-27 RX ADMIN — AZITHROMYCIN 255 MILLIGRAM(S): 500 TABLET, FILM COATED ORAL at 14:15

## 2020-08-27 RX ADMIN — Medication 100 MILLIGRAM(S): at 22:24

## 2020-08-27 RX ADMIN — ENOXAPARIN SODIUM 40 MILLIGRAM(S): 100 INJECTION SUBCUTANEOUS at 19:17

## 2020-08-27 RX ADMIN — CEFTRIAXONE 100 MILLIGRAM(S): 500 INJECTION, POWDER, FOR SOLUTION INTRAMUSCULAR; INTRAVENOUS at 13:02

## 2020-08-27 NOTE — H&P ADULT - PROBLEM SELECTOR PLAN 8
#gout  Diagnosed 20 years ago. On allopurinol at home that he takes sporadically. Last flare approximately 1 week ago in left elbow.  -c/w allopurinol   -c/w meloxicam Hgb on admission 12.3, currently no signs of active bleeding (no hematochezia, melena, hemoptysis, hematuria). Microcytic anemia. MCV 87.3 May be 2/2 to new renal insufficiency.  - obtain iron panel  - trend CBC  - maintain active T&S  - transfuse if Hgb <7 Hgb on admission 12.3, currently no signs of active bleeding (no hematochezia, melena, hemoptysis, hematuria). Normocytic anemia. MCV 87.3 May be 2/2 to new renal insufficiency.  - obtain iron panel  - trend CBC  - maintain active T&S  - transfuse if Hgb <7

## 2020-08-27 NOTE — H&P ADULT - ASSESSMENT
45 y.o. PMHx of gout and pinched nerve who presents with worsening chest pain with associated dry cough and SOB x 2 weeks and intermittent palpitations x 2 days found to have decreased GFR (47), elevated creatinine (1.91) and no signs of cardiac dysfunction on imaging. 45 y.o. PMHx of gout who presents with worsening chest pain with associated dry cough and SOB x 2 weeks and intermittent palpitations x 2 days found to have pulmonary hypertension and renal insufficiency.

## 2020-08-27 NOTE — H&P ADULT - PROBLEM SELECTOR PLAN 10
F: tolerating PO, no IVF  E: replete K<4, Mg<2  N: Dash/TLC    VTE Prophylaxis: Lovenox 40 Q24H  GI: not needed  C: Full Code  D: RMF

## 2020-08-27 NOTE — H&P ADULT - NSHPPHYSICALEXAM_GEN_ALL_CORE
PHYSICAL EXAM:  GENERAL: NAD, speaks in full sentences, no signs of respiratory distress  HEAD:  Atraumatic, Normocephalic  EYES: EOMI, PERRLA, conjunctiva and sclera clear  NECK: Supple, ttp in submandibular area  CHEST/LUNG: Clear to auscultation bilaterally; No wheeze; No crackles; No accessory muscles used  HEART: Regular rate and rhythm; No murmurs;   ABDOMEN: Soft, Nontender, Nondistended; Bowel sounds present; No guarding  EXTREMITIES:  2+ Peripheral Pulses, No cyanosis or edema  PSYCH: AAOx3  NEUROLOGY: non-focal  SKIN: No rashes or lesions

## 2020-08-27 NOTE — ED PROVIDER NOTE - CLINICAL SUMMARY MEDICAL DECISION MAKING FREE TEXT BOX
45 male obese seen 1 day ago in ED  dced home  elev Scr trops and epigastric pain palpitations now with continued dyspnea cough and palpitations-onset of chills today-  CXR  neeg per RAd  CT ? small right pleural eff  no consolidation ? patchy infilt  has cont elev trop ck and bnp  and d dimer (1600) Cr = 1.8  / covid neg-- dw  dr Gallegos  --she rec med floor admit-  she can eval if needed -- will not admit to her service on cards--rec stress test- / Echo in ED--  nl Ef  no RV dilation mild TR  ? pulm HTN  pt in no distress 160/90

## 2020-08-27 NOTE — H&P ADULT - PROBLEM SELECTOR PLAN 7
Hgb on admission 12.3, currently no signs of active bleeding (no hematochezia, melena, hemoptysis, hematuria). Microcytic anemia. MCV 87.3 May be 2/2 to new renal insufficiency.  - obtain iron panel  - trend CBC  - maintain active T&S  - transfuse if Hgb <7 Patient hypertensive on presentation to ED today 170/91; also hypertensive on presentation yesterday Patient hypertensive on presentation to ED today 170/91; also hypertensive on presentation to ED on day prior to this admission. No prior diagnosis of hypertension.  -start Norvasc 5

## 2020-08-27 NOTE — H&P ADULT - ATTENDING COMMENTS
47M PMHx of gout who presents with worsening chest pain with associated dry cough and SOB x 2 weeks and intermittent palpitations x 2 days found to have pulmonary hypertension and renal insufficiency.    - No clear evidence of cardiac disease - ECHO performed and found to be normal aside from pHTN - EKG sinus bradycardia w/o ischemic changes. Trop elevated 0.03, since plateaued - likely in setting of HTN urgency and renal insufficiency.   - Would focus on work up of pHTN - possibility of MARY being contributory and may explain nocturnal awakenings and palpitations. No lung disease or L sided heart disease. F/u HIV.  - DELMA - f/u urine lytes, UA w/ proteinuria and blood - f/u workup as above. Renal US w/ doppler ordered. Consult Nephrology.  - HTN Regimen must be addressed - likely contributing to renal disease - started norvasc, avoiding ACE/ARB and diuretics given potential DELMA.

## 2020-08-27 NOTE — ED PROVIDER NOTE - OBJECTIVE STATEMENT
old hx --44 yo m without significant PMHx c/o palpitations.  He had an episode of palpitations while watching TV around 7 pm tonight, lasted a couple of hours and gradually resolved; no chest pain or SOB.  Went to bed around 11 pm, and was awakened again around 3 am with palpitations and vague discomfort in the chest.  It lasted about 45 minutes before it started to resolve.  Says his heart was beating fast and hard, and it felt irregular.  No SOB, sweating, nausea/vomiting, weakness/lightheadedness.  No hx of fainting in his lifetime.  No hx of heart disease, DM, HTN, HLD.  Saw a cardiologist last year, says he had negative stress test and echocardiogram. 44 yo male seen yest in ED for epigastric pain palpitations sob and vague CP   no current CP  - after dc yest now with chills dry cough  no discrete fevers - also ? swelling LLE -  no prior hx of DVT or PE   last stress test and holter 1 year ago  no DM or smoking hx  no hx of elev chol   + Dad hx of cardiomyopathy   no daria CAd or MI      old hx --44 yo m without significant PMHx c/o palpitations.  He had an episode of palpitations while watching TV around 7 pm tonight, lasted a couple of hours and gradually resolved; no chest pain or SOB.  Went to bed around 11 pm, and was awakened again around 3 am with palpitations and vague discomfort in the chest.  It lasted about 45 minutes before it started to resolve.  Says his heart was beating fast and hard, and it felt irregular.  No SOB, sweating, nausea/vomiting, weakness/lightheadedness.  No hx of fainting in his lifetime.  No hx of heart disease, DM, HTN, HLD.  Saw a cardiologist last year, says he had negative stress test and echocardiogram.

## 2020-08-27 NOTE — ED PROVIDER NOTE - CARE PLAN
Principal Discharge DX:	Cough  Secondary Diagnosis:	Chills (without fever)  Secondary Diagnosis:	Dyspnea  Secondary Diagnosis:	Palpitations  Secondary Diagnosis:	Renal insufficiency  Secondary Diagnosis:	Elevated troponin
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
## 2020-08-27 NOTE — H&P ADULT - PROBLEM SELECTOR PLAN 9
F: tolerating PO, no IVF  E: replete K<4, Mg<2  N: Dash/TLC    VTE Prophylaxis: Lovenox 40 Q24H  GI: not needed  C: Full Code  D: RMF #gout  Diagnosed 20 years ago. On allopurinol at home that he takes sporadically. Last flare approximately 1 week ago in left elbow.  -c/w allopurinol   -c/w meloxicam Diagnosed 20 years ago. On allopurinol at home that he takes sporadically. Last flare approximately 1 week ago in left elbow.  -c/w allopurinol   -c/w meloxicam

## 2020-08-27 NOTE — H&P ADULT - NSHPLABSRESULTS_GEN_ALL_CORE
LABS:                        12.3   11.23 )-----------( 234      ( 27 Aug 2020 11:16 )             36.3     08-27    139  |  105  |  22  ----------------------------<  96  4.5   |  23  |  1.91<H>    Ca    9.1      27 Aug 2020 11:16  Mg     2.2     08-26    TPro  8.2  /  Alb  3.7  /  TBili  1.1  /  DBili  x   /  AST  31  /  ALT  23  /  AlkPhos  74  08-27      PT/INR - ( 27 Aug 2020 11:16 )   PT: 13.6 sec;   INR: 1.14          PTT - ( 27 Aug 2020 11:16 )  PTT:31.8 sec  CAPILLARY BLOOD GLUCOSE          CARDIAC MARKERS ( 27 Aug 2020 11:16 )  x     / 0.03 ng/mL / 580 U/L / x     / 4.4 ng/mL  CARDIAC MARKERS ( 26 Aug 2020 09:04 )  x     / 0.01 ng/mL / 458 U/L / x     / 3.5 ng/mL  CARDIAC MARKERS ( 26 Aug 2020 04:54 )  x     / 0.02 ng/mL / 484 U/L / x     / 3.9 ng/mL          Urinalysis Basic - ( 27 Aug 2020 16:48 )    Color: Yellow / Appearance: Clear / SG: >=1.030 / pH: x  Gluc: x / Ketone: NEGATIVE  / Bili: Negative / Urobili: 0.2 E.U./dL   Blood: x / Protein: >=300 mg/dL / Nitrite: NEGATIVE   Leuk Esterase: NEGATIVE / RBC: > 10 /HPF / WBC 5-10 /HPF   Sq Epi: x / Non Sq Epi: 5-10 /HPF / Bacteria: Present /HPF          LIVER FUNCTIONS - ( 27 Aug 2020 11:16 )  Alb: 3.7 g/dL / Pro: 8.2 g/dL / ALK PHOS: 74 U/L / ALT: 23 U/L / AST: 31 U/L / GGT: x                     I & O Summary:      Microbiology:        RADIOLOGY, EKG AND ADDITIONAL TESTS: Reviewed.

## 2020-08-27 NOTE — H&P ADULT - PROBLEM SELECTOR PLAN 6
Patient with WBC of 11.23. No recent steroid use. Afebrile with no s/s of infection. CXR clear. Possible infiltrates seen on CT. Likely reactive leukocytosis from stress of worsening symptoms  - trend cbc   - monitor for signs/symptoms of infection  - f/u HIV test

## 2020-08-27 NOTE — H&P ADULT - PROBLEM SELECTOR PLAN 4
Patient complaining of chest pain x2 weeks with associated SOB that worsens with deep inspiration. No history of asthma or smoking. Ddx includes MI, angina, costochondritis, pneumonia, pericarditis. Less likely MI given EKG, TTE, CT chest not concerning for acute cardiac dysfunction. Elevated trops and BNP may be attributable to demand ischemia. Also had elevated trops yesterday that trended down. Less likely angina given non radiating, not relieved by rest. No signs of pericarditis on EKG or TTE. Less concern for GERD given no association with meals. Less likely costochondritis given non tender to palpation on exam. CXR not concerning for PNA. CP maybe secondary to increased strain on heart may 2/2 to underlying MARY.  -f/u AM ekg  -trend troponins Patient complaining of chest pain x2 weeks with associated SOB that worsens with deep inspiration. No history of asthma or smoking. Ddx includes PE, MI, angina, costochondritis, pneumonia, pericarditis. DDimer elevated but low Wells score and patient lying comfortably in bed. Less likely MI given EKG, TTE, CT chest not concerning for acute cardiac dysfunction. Elevated trops and BNP may be attributable to demand ischemia. Also had elevated trops yesterday that trended down. Less likely angina given non radiating, not relieved by rest. No signs of pericarditis on EKG or TTE. Less concern for GERD given no association with meals. Less likely costochondritis given non tender to palpation on exam. CXR not concerning for PNA. CP maybe secondary to increased strain on heart 2/2 to underlying MARY, undiagnosed renal condition or pulmonary hypertension.   -f/u AM ekg  -trend troponins  -f/u LE doppler    #pulmonary hypertension  Noted on imaging and TTE. No signs of L heart failure on echo. No history of or evidence of chronic hypoxic lung disease. Likely group 4 or 5 etiology of pulmonary hypertension.   -f/u rheumatology labs  -f/u HIV

## 2020-08-27 NOTE — H&P ADULT - PROBLEM SELECTOR PLAN 2
Patient with no history of kidney disease now with elevated creatinine 1.91 and GFR 47. Also with elevated creatnine kinase 580. Patient describes increased urinary frequency recently. Also with no onset LE edema worse in left than right leg. Given BMI status, concern for new onset DM. However, sudden decline in kidney function concerning for possible renal artery stenosis, or disruption in the RAAS pathway. Also concern for lupus.   -f/u UA  -f/u Urine Na, Urine Creatnine  -consider renal artery Doppler  -f/u serum renin  -f/u serum steven Patient with no history of kidney disease now with elevated creatinine 1.91 and GFR 47. Also with elevated creatnine kinase 580. Patient describes increased urinary frequency recently. Also with no onset LE edema worse in left than right leg. Given BMI status, concern for new onset DM. However, sudden decline in kidney function concerning for possible renal artery stenosis, or disruption in the RAAS pathway. Also concern for lupus or ANCA associated vasculitis.   -f/u UA  -f/u Urine Na, Urine Creatnine  -consider renal artery Doppler  -f/u serum renin  -f/u serum steven  -f/u ANCA  -f/u KIRAN Patient with no history of kidney disease now with elevated creatinine 1.91 and GFR 47. Also with elevated creatinine kinase 580. Patient describes increased urinary frequency recently. Also with new onset LE edema worse in left than right leg. Given BMI status, concern for new onset DM. However, glucose wnl. Sudden decline in kidney function concerning for possible renal artery stenosis, or disruption in the RAAS pathway. Also concern for lupus, ANCA associated vasculitis or renal failure secondary to syphilis, HIV or hepatitis.    -f/u UA  -f/u Urine Na, Urine Creatinine  -f/u urine total protein  -f/u urine protein: creatine ratio  -consider renal artery Doppler  -f/u serum renin  -f/u serum steven  -f/u ANCA  -f/u KIRAN  -f/u HIV test  -f/u hepatitis panel  -f/u C3, C4 levels  -f/u RPR Patient with no history of kidney disease now with elevated creatinine 1.91 and GFR 47. Also with elevated creatinine kinase 580. Patient describes increased urinary frequency recently. Also with new onset LE edema worse in left than right leg. Given BMI status, concern for new onset DM. However, glucose wnl. Sudden decline in kidney function concerning for possible renal artery stenosis, or disruption in the RAAS pathway. Also concern for lupus, ANCA associated vasculitis or renal failure secondary to syphilis, HIV or hepatitis.    -f/u UA  -f/u Urine Na, Urine Creatinine  -f/u urine total protein  -f/u urine protein: creatine ratio  -consider renal artery Doppler  -f/u serum renin  -f/u serum steven  -f/u ANCA  -f/u KIRAN  -f/u HIV test  -f/u hepatitis panel  -f/u C3, C4 levels  -f/u RPR  -f/u A1c

## 2020-08-27 NOTE — H&P ADULT - PROBLEM SELECTOR PLAN 1
Patient with new onset intermittent palpitations x2 days with eplisodes lasting >1 hour. Ddx includes pheochromocytoma, thyroid dysfunction, MARY, panic attack. Patient complaining of persistent headache and hypertensive on presentation consistent with possible pheo. Less likely thyroid dysfunction given absence of other symptoms. No history of anxiety or panic attack. However startle of not being able to breath may cause palpitations.  - f/u TSH  - consider Halter monitor Patient with new onset intermittent palpitations x2 days with episodes lasting >1 hour. Ddx includes pheochromocytoma, thyroid dysfunction, MARY, panic attack. Patient complaining of persistent headache and hypertensive on presentation consistent with possible pheo. However BPs not too elevated. Less likely thyroid dysfunction given absence of other symptoms. No history of anxiety or panic attack. However, startle of not being able to breath may cause anxiety and palpitations.  - f/u TSH  - consider Halter monitor

## 2020-08-27 NOTE — H&P ADULT - PROBLEM SELECTOR PLAN 5
Patient describes frequent awakenings at night because of an inability to breathe. Has to position himself with 4 pillows. Unsure if he snores. Low – intermediate risk on STOB Bang Questionnaire.   - consider outpatient sleeping study Patient describes frequent awakenings at night because of an inability to breathe. Has to position himself with 4 pillows. Unsure if he snores. Intermediate risk on STOB Bang Questionnaire.   - consider outpatient sleeping study

## 2020-08-27 NOTE — ED ADULT NURSE NOTE - NSIMPLEMENTINTERV_GEN_ALL_ED
Implemented All Universal Safety Interventions:  [place] to call system. Call bell, personal items and telephone within reach. Instruct patient to call for assistance. Room bathroom lighting operational. Non-slip footwear when patient is off stretcher. Physically safe environment: no spills, clutter or unnecessary equipment. Stretcher in lowest position, wheels locked, appropriate side rails in place.

## 2020-08-27 NOTE — H&P ADULT - NSHPSOCIALHISTORY_GEN_ALL_CORE
Patient lives alone. Never smoker, never recreational drug use. Social drinker, 1-2 drinks on social occasions. 1 sexual partner in the last year but not in a monogamous relationship - male partner.

## 2020-08-27 NOTE — H&P ADULT - PROBLEM SELECTOR PLAN 3
Trop 0.03, BNP 1856 on arrival to ED. Also elevated yesterday and down trended. EKG and imaging with signs of acute ischemic changes. Likely elevated 2/2 demand ischemia.  -trend troponins

## 2020-08-27 NOTE — H&P ADULT - HISTORY OF PRESENT ILLNESS
45 y.o. PMHx of gout and pinched nerve who presents with worsening chest pain with associated dry cough and SOB x 2 weeks and intermittent palpitations x 2 days. Patient describes pain as burning sensation in midchest/ epigastric area that does not radiate anywhere. Pain worsens with deep inspiration but not with leaning forward. Denies any association of chest pain with meals. Patient has also noticed increased swelling in his left lower extremity as well as increased urinary frequency. Denies any hematuria or dysuria. Patient also reports unable to sleep well through the night at times having to waking up gasping for air and needs to sleep with 4 pillows at night. Endorses headache that is worse than usual. Denies any lightheadedness, sweating, vomiting, weakness. No history of heart disease, DM, smoking or prior DVT.     Of note patient was also in the ED two weeks prior for URI sxs and fever. Was tested for COVID and sent home.     In the ED  vitals   T 98.4, HR 69, /91, RR 18, O2 sat 98% on RA  Labs notable for WBC 11.23, Hgb/Hct 12.3/36.3, Ddimer 1684, Creatnine 1.91, GFR 47, , Trop 0.03, BNP 1856, CRP 1.88…lactate 1.2.     EKG: NSR no signs of ischemic changes,    Imaging  CXR: unremarkable    TTE  1. There is mild tricuspid regurgitation. Pulmonary artery systolic pressure (estimated using the tricuspid regurgitant gradient and an estimate of right atrial pressure) is 61 mmHg.  2. The right ventricle is normal in size. Right ventricular systolic function is normal. The tricuspid annular plane systolic excursion (TAPSE) is 35.00 mm (normal >=17 mm). RV tissue Doppler S' is 12.60 cm/s (normal >10 cm/s).   3. The left ventricle is normal in size, wall thickness, and systolic function with a calculated ejection fraction of 68%.   4. No pericardial effusion.    CT Chest noncom  1. Mild pulmonary venous congestive changes characterized by interlobular septal thickening with mild bibasilar pleural effusions right greater than left. There is mild cardiomegaly.  2. Mild main pulmonary artery dilatation. Mild pulmonary artery hypertension cannot be excluded.  3. Hepatic steatosis and mild gynecomastia. Mild splenomegaly. Clinical and laboratory correlation.     In the ED, patient treated with , azithromycin, ceftriaxone, 1L NS 45 y.o. PMHx of gout and pinched nerve who presents with worsening chest pain with associated dry cough and SOB x 2 weeks and intermittent palpitations x 2 days. Patient describes pain as burning sensation in midchest/ epigastric area that does not radiate anywhere. Pain worsens with deep inspiration but not with leaning forward. Denies any association of chest pain with meals. Patient has also noticed increased swelling in his left lower extremity as well as increased urinary frequency. Denies any hematuria or dysuria. Patient also reports unable to sleep well through the night at times having to waking up gasping for air and needs to sleep with 4 pillows at night. Endorses headache that is worse than usual. Denies any lightheadedness, sweating, vomiting, weakness. No history of heart disease, DM, smoking or prior DVT.     Of note patient was also in the ED two weeks prior for URI sxs and fever. Was tested for COVID and sent home.     In the ED  vitals   T 98.4, HR 69, /91, RR 18, O2 sat 98% on RA  Labs notable for WBC 11.23, Hgb/Hct 12.3/36.3, Ddimer 1684, Creatnine 1.91, GFR 47, , Trop 0.03, BNP 1856, CRP 1.88…lactate 1.2.     EKG: NSR no signs of ischemic changes,    Imaging  CXR: unremarkable    TTE  1. There is mild tricuspid regurgitation. Pulmonary artery systolic pressure (estimated using the tricuspid regurgitant gradient and an estimate of right atrial pressure) is 61 mmHg.  2. The right ventricle is normal in size. Right ventricular systolic function is normal. The tricuspid annular plane systolic excursion (TAPSE) is 35.00 mm (normal >=17 mm). RV tissue Doppler S' is 12.60 cm/s (normal >10 cm/s).   3. The left ventricle is normal in size, wall thickness, and systolic function with a calculated ejection fraction of 68%.   4. No pericardial effusion.    CT Chest noncon  1. Mild pulmonary venous congestive changes characterized by interlobular septal thickening with mild bibasilar pleural effusions right greater than left. There is mild cardiomegaly.  2. Mild main pulmonary artery dilatation. Mild pulmonary artery hypertension cannot be excluded.  3. Hepatic steatosis and mild gynecomastia. Mild splenomegaly. Clinical and laboratory correlation.     In the ED, patient treated with , azithromycin, ceftriaxone, 1L NS 45 y.o. PMHx of gout and pinched nerve who presents with worsening chest pain with associated dry cough and SOB x 2 weeks and intermittent palpitations x 2 days. Patient describes pain as burning sensation in midchest/ epigastric area that does not radiate, occurs both at rest and with exertion. Pain worsens with deep inspiration but not with leaning forward. Denies any association of chest pain with meals. Woke up from sleep at 3am one day prior to this admission because of palpitations which continued for over an hour. Patient has also noticed increased swelling in his left lower extremity as well as increased urinary frequency. Denies any hematuria or dysuria. Patient also reports unable to sleep well through the night at times having to waking up gasping for air and needs to sleep with 4 pillows at night. Endorses headache that is worse than usual. Denies any lightheadedness, sweating, vomiting, weakness. No history of heart disease, DM, smoking or prior DVT.     Of note patient was also in the ED two weeks prior for URI sxs and fever. Was tested for COVID and sent home.     In the ED  vitals   T 98.4, HR 69, /91, RR 18, O2 sat 98% on RA  Labs notable for WBC 11.23, Hgb/Hct 12.3/36.3, Ddimer 1684, Creatnine 1.91, GFR 47, , Trop 0.03, BNP 1856, CRP 1.88…lactate 1.2.     EKG: NSR no signs of ischemic changes,    Imaging  CXR: unremarkable    TTE  1. There is mild tricuspid regurgitation. Pulmonary artery systolic pressure (estimated using the tricuspid regurgitant gradient and an estimate of right atrial pressure) is 61 mmHg.  2. The right ventricle is normal in size. Right ventricular systolic function is normal. The tricuspid annular plane systolic excursion (TAPSE) is 35.00 mm (normal >=17 mm). RV tissue Doppler S' is 12.60 cm/s (normal >10 cm/s).   3. The left ventricle is normal in size, wall thickness, and systolic function with a calculated ejection fraction of 68%.   4. No pericardial effusion.    CT Chest noncon  1. Mild pulmonary venous congestive changes characterized by interlobular septal thickening with mild bibasilar pleural effusions right greater than left. There is mild cardiomegaly.  2. Mild main pulmonary artery dilatation. Mild pulmonary artery hypertension cannot be excluded.  3. Hepatic steatosis and mild gynecomastia. Mild splenomegaly. Clinical and laboratory correlation.     In the ED, patient treated with , azithromycin, ceftriaxone, 1L NS 45 y.o. PMHx of gout who presents with worsening chest pain with associated dry cough and SOB x 2 weeks and intermittent palpitations x 2 days. Patient describes pain as burning sensation in midchest/ epigastric area that does not radiate, occurs both at rest and with exertion. Pain worsens with deep inspiration but not with leaning forward. Denies any association of chest pain with meals. Woke up from sleep at 3am one day prior to this admission because of palpitations which continued for over an hour - patient came to the ED and was discharged home. Patient has also noticed increased swelling in his left lower extremity as well as increased urinary frequency. Denies any hematuria or dysuria. Patient also reports unable to sleep well through the night at times having to waking up gasping for air and needs to sleep with 4 pillows at night. Endorses headache that is worse than usual. Denies any lightheadedness, sweating, vomiting, weakness. No history of heart disease, DM, smoking or prior DVT.   Of note patient was also in the ED two weeks prior for URI sxs and fever - highest recorded at home was 102. Was tested for COVID and sent home.   In the ED  vitals   T 98.4, HR 69, /91, RR 18, O2 sat 98% on RA  Labs notable for WBC 11.23, Hgb/Hct 12.3/36.3, Ddimer 1684, Creatinine 1.91, GFR 47, , Trop 0.03, BNP 1856, CRP 1.88…lactate 1.2.  EKG: NSR, bradycardia, no signs of ischemic changes  Imaging  CXR: unremarkable  TTE:  1. There is mild tricuspid regurgitation. Pulmonary artery systolic pressure (estimated using the tricuspid regurgitant gradient and an estimate of right atrial pressure) is 61 mmHg.  2. The right ventricle is normal in size. Right ventricular systolic function is normal. The tricuspid annular plane systolic excursion (TAPSE) is 35.00 mm (normal >=17 mm). RV tissue Doppler S' is 12.60 cm/s (normal >10 cm/s).  3. The left ventricle is normal in size, wall thickness, and systolic function with a calculated ejection fraction of 68%.  4. No pericardial effusion.    CT Chest noncon  1. Mild pulmonary venous congestive changes characterized by interlobular septal thickening with mild bibasilar pleural effusions right greater than left. There is mild cardiomegaly.  2. Mild main pulmonary artery dilatation. Mild pulmonary artery hypertension cannot be excluded.  3. Hepatic steatosis and mild gynecomastia. Mild splenomegaly. Clinical and laboratory correlation.  In the ED, patient treated with , azithromycin, ceftriaxone, 1L NS

## 2020-08-28 ENCOUNTER — TRANSCRIPTION ENCOUNTER (OUTPATIENT)
Age: 45
End: 2020-08-28

## 2020-08-28 DIAGNOSIS — N17.9 ACUTE KIDNEY FAILURE, UNSPECIFIED: ICD-10-CM

## 2020-08-28 DIAGNOSIS — I27.20 PULMONARY HYPERTENSION, UNSPECIFIED: ICD-10-CM

## 2020-08-28 LAB
ALBUMIN SERPL ELPH-MCNC: 3.3 G/DL — SIGNIFICANT CHANGE UP (ref 3.3–5)
ALP SERPL-CCNC: 68 U/L — SIGNIFICANT CHANGE UP (ref 40–120)
ALT FLD-CCNC: 18 U/L — SIGNIFICANT CHANGE UP (ref 10–45)
ANION GAP SERPL CALC-SCNC: 10 MMOL/L — SIGNIFICANT CHANGE UP (ref 5–17)
ASO AB SER QL: 280 IU/ML — HIGH (ref 0–199)
AST SERPL-CCNC: 25 U/L — SIGNIFICANT CHANGE UP (ref 10–40)
BASOPHILS # BLD AUTO: 0.03 K/UL — SIGNIFICANT CHANGE UP (ref 0–0.2)
BASOPHILS NFR BLD AUTO: 0.3 % — SIGNIFICANT CHANGE UP (ref 0–2)
BILIRUB SERPL-MCNC: 1 MG/DL — SIGNIFICANT CHANGE UP (ref 0.2–1.2)
BUN SERPL-MCNC: 28 MG/DL — HIGH (ref 7–23)
C3 SERPL-MCNC: 32 MG/DL — LOW (ref 81–157)
C4 SERPL-MCNC: 12 MG/DL — LOW (ref 13–39)
CALCIUM SERPL-MCNC: 8.7 MG/DL — SIGNIFICANT CHANGE UP (ref 8.4–10.5)
CHLORIDE SERPL-SCNC: 107 MMOL/L — SIGNIFICANT CHANGE UP (ref 96–108)
CHOLEST SERPL-MCNC: 143 MG/DL — SIGNIFICANT CHANGE UP (ref 10–199)
CO2 SERPL-SCNC: 22 MMOL/L — SIGNIFICANT CHANGE UP (ref 22–31)
CORTIS AM PEAK SERPL-MCNC: 8.6 UG/DL — SIGNIFICANT CHANGE UP (ref 3.9–37.5)
CREAT ?TM UR-MCNC: 257 MG/DL — SIGNIFICANT CHANGE UP
CREAT SERPL-MCNC: 2.32 MG/DL — HIGH (ref 0.5–1.3)
EOSINOPHIL # BLD AUTO: 0.1 K/UL — SIGNIFICANT CHANGE UP (ref 0–0.5)
EOSINOPHIL NFR BLD AUTO: 1.1 % — SIGNIFICANT CHANGE UP (ref 0–6)
FERRITIN SERPL-MCNC: 244 NG/ML — SIGNIFICANT CHANGE UP (ref 30–400)
GLUCOSE SERPL-MCNC: 96 MG/DL — SIGNIFICANT CHANGE UP (ref 70–99)
HAV IGM SER-ACNC: SIGNIFICANT CHANGE UP
HBV CORE IGM SER-ACNC: SIGNIFICANT CHANGE UP
HBV SURFACE AG SER-ACNC: SIGNIFICANT CHANGE UP
HCT VFR BLD CALC: 33.6 % — LOW (ref 39–50)
HCV AB S/CO SERPL IA: 0.25 S/CO — SIGNIFICANT CHANGE UP
HCV AB SERPL-IMP: SIGNIFICANT CHANGE UP
HDLC SERPL-MCNC: 25 MG/DL — LOW
HGB BLD-MCNC: 11.4 G/DL — LOW (ref 13–17)
IMM GRANULOCYTES NFR BLD AUTO: 0.3 % — SIGNIFICANT CHANGE UP (ref 0–1.5)
IRON SATN MFR SERPL: 31 % — SIGNIFICANT CHANGE UP (ref 16–55)
IRON SATN MFR SERPL: 61 UG/DL — SIGNIFICANT CHANGE UP (ref 45–165)
LIPID PNL WITH DIRECT LDL SERPL: 94 MG/DL — SIGNIFICANT CHANGE UP
LYMPHOCYTES # BLD AUTO: 1.91 K/UL — SIGNIFICANT CHANGE UP (ref 1–3.3)
LYMPHOCYTES # BLD AUTO: 20.1 % — SIGNIFICANT CHANGE UP (ref 13–44)
MAGNESIUM SERPL-MCNC: 2.1 MG/DL — SIGNIFICANT CHANGE UP (ref 1.6–2.6)
MCHC RBC-ENTMCNC: 29.5 PG — SIGNIFICANT CHANGE UP (ref 27–34)
MCHC RBC-ENTMCNC: 33.9 GM/DL — SIGNIFICANT CHANGE UP (ref 32–36)
MCV RBC AUTO: 87 FL — SIGNIFICANT CHANGE UP (ref 80–100)
MONOCYTES # BLD AUTO: 1.07 K/UL — HIGH (ref 0–0.9)
MONOCYTES NFR BLD AUTO: 11.2 % — SIGNIFICANT CHANGE UP (ref 2–14)
NEUTROPHILS # BLD AUTO: 6.38 K/UL — SIGNIFICANT CHANGE UP (ref 1.8–7.4)
NEUTROPHILS NFR BLD AUTO: 67 % — SIGNIFICANT CHANGE UP (ref 43–77)
NRBC # BLD: 0 /100 WBCS — SIGNIFICANT CHANGE UP (ref 0–0)
PHOSPHATE SERPL-MCNC: 4.1 MG/DL — SIGNIFICANT CHANGE UP (ref 2.5–4.5)
PLATELET # BLD AUTO: 226 K/UL — SIGNIFICANT CHANGE UP (ref 150–400)
POTASSIUM SERPL-MCNC: 4.2 MMOL/L — SIGNIFICANT CHANGE UP (ref 3.5–5.3)
POTASSIUM SERPL-SCNC: 4.2 MMOL/L — SIGNIFICANT CHANGE UP (ref 3.5–5.3)
PROT ?TM UR-MCNC: >200 MG/DL — HIGH (ref 0–12)
PROT ?TM UR-MCNC: >200 MG/DL — HIGH (ref 0–12)
PROT SERPL-MCNC: 7.6 G/DL — SIGNIFICANT CHANGE UP (ref 6–8.3)
PROT/CREAT UR-RTO: >0.8 RATIO — HIGH (ref 0–0.2)
RBC # BLD: 3.86 M/UL — LOW (ref 4.2–5.8)
RBC # FLD: 12.2 % — SIGNIFICANT CHANGE UP (ref 10.3–14.5)
RHEUMATOID FACT SERPL-ACNC: <10 IU/ML — SIGNIFICANT CHANGE UP (ref 0–13)
SODIUM SERPL-SCNC: 139 MMOL/L — SIGNIFICANT CHANGE UP (ref 135–145)
SODIUM UR-SCNC: 40 MMOL/L — SIGNIFICANT CHANGE UP
TIBC SERPL-MCNC: 197 UG/DL — LOW (ref 220–430)
TOTAL CHOLESTEROL/HDL RATIO MEASUREMENT: 5.7 RATIO — SIGNIFICANT CHANGE UP (ref 3.4–9.6)
TRANSFERRIN SERPL-MCNC: 155 MG/DL — LOW (ref 200–360)
TRIGL SERPL-MCNC: 122 MG/DL — SIGNIFICANT CHANGE UP (ref 10–149)
TROPONIN T SERPL-MCNC: 0.03 NG/ML — HIGH (ref 0–0.01)
UIBC SERPL-MCNC: 136 UG/DL — SIGNIFICANT CHANGE UP (ref 110–370)
WBC # BLD: 9.52 K/UL — SIGNIFICANT CHANGE UP (ref 3.8–10.5)
WBC # FLD AUTO: 9.52 K/UL — SIGNIFICANT CHANGE UP (ref 3.8–10.5)

## 2020-08-28 PROCEDURE — 99223 1ST HOSP IP/OBS HIGH 75: CPT

## 2020-08-28 PROCEDURE — 99233 SBSQ HOSP IP/OBS HIGH 50: CPT | Mod: GC

## 2020-08-28 PROCEDURE — 99253 IP/OBS CNSLTJ NEW/EST LOW 45: CPT

## 2020-08-28 RX ORDER — LANOLIN ALCOHOL/MO/W.PET/CERES
5 CREAM (GRAM) TOPICAL ONCE
Refills: 0 | Status: COMPLETED | OUTPATIENT
Start: 2020-08-28 | End: 2020-08-29

## 2020-08-28 RX ORDER — AMLODIPINE BESYLATE 2.5 MG/1
10 TABLET ORAL EVERY 24 HOURS
Refills: 0 | Status: DISCONTINUED | OUTPATIENT
Start: 2020-08-29 | End: 2020-08-31

## 2020-08-28 RX ORDER — CARVEDILOL PHOSPHATE 80 MG/1
12.5 CAPSULE, EXTENDED RELEASE ORAL EVERY 12 HOURS
Refills: 0 | Status: DISCONTINUED | OUTPATIENT
Start: 2020-08-28 | End: 2020-09-01

## 2020-08-28 RX ORDER — ACETAMINOPHEN 500 MG
650 TABLET ORAL ONCE
Refills: 0 | Status: COMPLETED | OUTPATIENT
Start: 2020-08-28 | End: 2020-08-28

## 2020-08-28 RX ORDER — ACETAMINOPHEN 500 MG
650 TABLET ORAL EVERY 6 HOURS
Refills: 0 | Status: DISCONTINUED | OUTPATIENT
Start: 2020-08-28 | End: 2020-09-01

## 2020-08-28 RX ORDER — HEPARIN SODIUM 5000 [USP'U]/ML
5000 INJECTION INTRAVENOUS; SUBCUTANEOUS EVERY 8 HOURS
Refills: 0 | Status: DISCONTINUED | OUTPATIENT
Start: 2020-08-28 | End: 2020-09-01

## 2020-08-28 RX ORDER — AMLODIPINE BESYLATE 2.5 MG/1
10 TABLET ORAL EVERY 24 HOURS
Refills: 0 | Status: DISCONTINUED | OUTPATIENT
Start: 2020-08-28 | End: 2020-08-28

## 2020-08-28 RX ORDER — FUROSEMIDE 40 MG
40 TABLET ORAL ONCE
Refills: 0 | Status: COMPLETED | OUTPATIENT
Start: 2020-08-28 | End: 2020-08-28

## 2020-08-28 RX ORDER — PENICILLIN V POTASSIUM 250 MG
500 TABLET ORAL EVERY 12 HOURS
Refills: 0 | Status: DISCONTINUED | OUTPATIENT
Start: 2020-08-28 | End: 2020-09-01

## 2020-08-28 RX ORDER — SIMETHICONE 80 MG/1
80 TABLET, CHEWABLE ORAL ONCE
Refills: 0 | Status: COMPLETED | OUTPATIENT
Start: 2020-08-28 | End: 2020-08-28

## 2020-08-28 RX ADMIN — CARVEDILOL PHOSPHATE 12.5 MILLIGRAM(S): 80 CAPSULE, EXTENDED RELEASE ORAL at 18:59

## 2020-08-28 RX ADMIN — Medication 500 MILLIGRAM(S): at 18:59

## 2020-08-28 RX ADMIN — AMLODIPINE BESYLATE 5 MILLIGRAM(S): 2.5 TABLET ORAL at 06:15

## 2020-08-28 RX ADMIN — Medication 650 MILLIGRAM(S): at 16:50

## 2020-08-28 RX ADMIN — Medication 650 MILLIGRAM(S): at 17:11

## 2020-08-28 RX ADMIN — SIMETHICONE 80 MILLIGRAM(S): 80 TABLET, CHEWABLE ORAL at 16:49

## 2020-08-28 RX ADMIN — Medication 40 MILLIGRAM(S): at 16:49

## 2020-08-28 RX ADMIN — Medication 650 MILLIGRAM(S): at 04:55

## 2020-08-28 RX ADMIN — HEPARIN SODIUM 5000 UNIT(S): 5000 INJECTION INTRAVENOUS; SUBCUTANEOUS at 21:11

## 2020-08-28 RX ADMIN — CARVEDILOL PHOSPHATE 12.5 MILLIGRAM(S): 80 CAPSULE, EXTENDED RELEASE ORAL at 12:38

## 2020-08-28 NOTE — DISCHARGE NOTE PROVIDER - PROVIDER TOKENS
PROVIDER:[TOKEN:[[id number]]] PROVIDER:[TOKEN:[[id number]]],PROVIDER:[TOKEN:[[id number]]]

## 2020-08-28 NOTE — PROGRESS NOTE ADULT - SUBJECTIVE AND OBJECTIVE BOX
Patient was seen and examined by me at bedside. I agree with resident's note, subjective, objective physical exam, assessment and plan with following modifications/additions.    Greater than 35 minutes spent on total encounter; more than 50% of the visit was spent counseling and/or coordinating care by the attending physician.    S: Reports to me for the past 2 weeks PND at night also with palpitations, no associated chest pain, difficulty with sleeping frequent awakening unsure if snoring hx.  During the day no cardiopulm complaints, able to walk 10 blocks with no chest pain/sob. Recent URI 2 weeks ago. Endorses otherwise besides gout with recent flare (swelling of L foot improved on colichine) relatively healthy- never with HTN, no kidney issues (gets physical yearly with cbc/bmp). Of note since June of this year with father passing lot of stress.     O: VS-afeb, HTN, sating well on RA. Lying flat on my interview and comfortable, no resp distress, AOx3, lungs clear, CV RRR no m/r/g, abd soft, nt, ext wwp no le edema. Labs reviewed- notable for new DELMA with active sediment- proteinurea and RBCs, mild normocytic anemia neg iron studies. Imaging- CT chest noncon small effusions b/l with congestion, TTE IVC dilated, PASP 60's, L side LA normal, LV wnl, diastolic function hard to assess.     A/P: 46yo M with hx of gout presenting with new HTN, new DELMA with active sediment concern for new GN possible post infectious (post-strep) with cardiac compliants suggestive of mild overload and likely underlying pulm HTN driving PASP, palpitations suggesting occult arrythmia   -DELMA with new HTN c/f new glomerular disease- renal consulted- reviewed case with attending, high c/f for post strep GN per hx- f/u compliments, check ASO.  As for other glomerular workup- HIV/hep c neg, autoimmune workup pending though unlikely, vasculitis workup also pending.  Plan to monitor this wknd to ensure not rapidly progressive, if uptrend creat without clear etiology, likely renal biopsy monday   -Pulm HTN, mild overload on CT, PND symptoms- cards consulted- likely will need some diuresis IV today- will review with cards ok per renal, PASP elevation likely from undiagnosed MARY and component of diastolic dysfunction also. outpt sleep study   -Palpitations-would benefit from outpt telemetry monitoring (holter). will start bb here for ddx of PVC vs afib/aflutter with RVR episodes  -C/f sleep apnea- outpt sleep study   -HTN- f/u recs, on amlodipine and we started coreg   -Gout- renally dose home allopurinol, at risk for flare if diuresis   -DVT- switch to SQH with DELMA   -Dispo- no needs when ready, likely here through wknd with uptrending DELMA and workup pending    John Noe MD [id number] Patient was seen and examined by me at bedside. I agree with resident's note, subjective, objective physical exam, assessment and plan with following modifications/additions.    Greater than 35 minutes spent on total encounter; more than 50% of the visit was spent counseling and/or coordinating care by the attending physician.    S: Reports to me for the past 2 weeks PND at night also with palpitations, no associated chest pain, difficulty with sleeping frequent awakening unsure if snoring hx.  During the day no cardiopulm complaints, able to walk 10 blocks with no chest pain/sob. Recent URI 2 weeks ago with sore throat, COVID neg. Endorses otherwise besides gout with recent flare (swelling of L foot improved on colichine) relatively healthy- never with HTN, no kidney issues (gets physical yearly with cbc/bmp). Of note since June of this year with father passing lot of stress.     O: VS-afeb, HTN, sating well on RA. Lying flat on my interview and comfortable, no resp distress, AOx3, lungs clear, CV RRR no m/r/g, abd soft, nt, ext wwp no le edema. Labs reviewed- notable for new DELMA with active sediment- proteinurea and RBCs, mild normocytic anemia neg iron studies. Imaging- CT chest noncon small effusions b/l with congestion, TTE IVC dilated, PASP 60's, L side LA normal, LV wnl, diastolic function hard to assess.     A/P: 48yo M with hx of gout presenting with new HTN, new DELMA with active sediment with nonscarred kidneys concern for acute GN possible post infectious (post-strep) with cardiac complaints suggestive of mild overload and likely underlying MARY driving pulm HTN, palpitations suggesting occult arrythmia   -DELMA with new HTN c/f new glomerular disease- renal consulted- reviewed case with attending, high c/f for post strep GN per hx- f/u compliments, check ASO, rx with penicillin if positive.  As for other glomerular workup- HIV/hep c neg, autoimmune workup pending though unlikely, vasculitis workup also pending.  Plan to monitor this wknd to ensure not rapidly progressive, if uptrend creat without clear etiology, likely renal biopsy monday   -Pulm HTN, mild overload on CT, PND symptoms- cards consulted- likely will need some diuresis IV today- will review with cards, ok per renal. PASP elevation likely from undiagnosed MARY and component of diastolic dysfunction also. outpt sleep study   -Palpitations-would benefit from outpt telemetry monitoring (holter). will start bb here for ddx of PVC vs afib/aflutter with RVR episodes  -HTN- f/u recs, on amlodipine increase to 10mg and we started coreg   -Gout- renally dose home allopurinol for px, at risk for flare if diuresis   -DVT- switch to SQH with DELMA   -Dispo- no needs when ready, likely here through Monday with uptrending DELMA and workup oending (including biospy)    John Noe MD [phone] Patient was seen and examined by me at bedside. I agree with resident's note, subjective, objective physical exam, assessment and plan with following modifications/additions.    Greater than 35 minutes spent on total encounter; more than 50% of the visit was spent counseling and/or coordinating care by the attending physician.    S: Reports to me for the past 2 weeks PND at night also with palpitations, no associated chest pain, difficulty with sleeping frequent awakening unsure if snoring hx.  During the day no cardiopulm complaints, able to walk 10 blocks with no chest pain/sob. Recent URI 2 weeks ago with sore throat, COVID neg. Endorses otherwise besides gout with recent flare (swelling of L foot improved on colichine) relatively healthy- never with HTN, no kidney issues (gets physical yearly with cbc/bmp). Of note since June of this year with father passing lot of stress.     O: VS-afeb, HTN, sating well on RA. Lying flat on my interview and comfortable, no resp distress, AOx3, lungs clear, CV RRR no m/r/g, abd soft, nt, ext wwp no le edema. Labs reviewed- notable for new DELMA with active sediment- proteinurea and RBCs, mild normocytic anemia neg iron studies. Imaging- CT chest noncon small effusions b/l with congestion, TTE IVC dilated, PASP 60's, L side LA normal, LV wnl, diastolic function hard to assess.     A/P: 46yo M with hx of gout presenting with new HTN, new DELMA with active sediment with nonscarred kidneys concern for acute GN possible post infectious (post-strep) with cardiac complaints suggestive of mild overload and likely underlying MARY driving pulm HTN, palpitations suggesting occult arrythmia   -DELMA with new HTN c/f new glomerular disease- renal consulted- reviewed case with attending, high c/f for post strep GN per hx- f/u compliments, check ASO, rx with penicillin if positive.  As for other glomerular workup- HIV/hep c neg, autoimmune workup pending though unlikely, vasculitis workup also pending.  Plan to monitor this wknd to ensure not rapidly progressive, if uptrend creat without clear etiology, likely renal biopsy monday   -Pulm HTN, mild overload on CT, PND symptoms- cards consulted- likely will need some diuresis IV today- will review with cards, ok per renal. PASP elevation likely from undiagnosed MARY and component of diastolic dysfunction also. outpt sleep study   -Palpitations-would benefit from outpt telemetry monitoring (holter). will start bb here for ddx of PVC vs afib/aflutter with RVR episodes  -HTN- f/u recs, on amlodipine increase to 10mg and we started coreg   -Gout- renally dose home allopurinol for px, at risk for flare if diuresis   -DVT- switch to SQH with DELMA   -Dispo- no needs when ready, likely here through Monday with uptrending DELMA and workup pending (including biopsy)    John Noe MD [phone] Patient was seen and examined by me at bedside. I agree with resident's note, subjective, objective physical exam, assessment and plan with following modifications/additions.    Greater than 35 minutes spent on total encounter; more than 50% of the visit was spent counseling and/or coordinating care by the attending physician.    S: Reports to me for the past 2 weeks PND at night also with palpitations, no associated chest pain, difficulty with sleeping frequent awakening unsure if snoring hx.  During the day no cardiopulm complaints, able to walk 10 blocks with no chest pain/sob. Recent URI 2 weeks ago with sore throat, COVID neg. Endorses otherwise besides gout with recent flare (swelling of L foot improved on colichine) relatively healthy- never with HTN, no kidney issues (gets physical yearly with cbc/bmp). Of note since June of this year with father passing lot of stress.     O: VS-afeb, HTN, sating well on RA. Lying flat on my interview and comfortable, no resp distress, AOx3, lungs clear, CV RRR no m/r/g, abd soft, nt, ext wwp no le edema. Labs reviewed- notable for new DELMA with active sediment- proteinurea and RBCs, mild normocytic anemia neg iron studies. Imaging- CT chest noncon small effusions b/l with congestion, TTE IVC dilated, PASP 60's, L side LA normal, LV wnl, diastolic function hard to assess.     A/P: 46yo M with hx of gout presenting with new HTN, new DELMA with active sediment with nonscarred kidneys concern for acute GN possible post infectious (post-strep) with cardiac complaints suggestive of mild overload and likely underlying MARY driving pulm HTN, palpitations suggesting occult arrythmia   -DELMA with new HTN c/f new glomerular disease- renal consulted- reviewed case with attending, high c/f for post strep GN per hx, ASO, and low compliments. Started on penicillin rx- trend creat, and if stable can consider dc this wknd if ok per renal  As for other glomerular workup- HIV/hep c neg, autoimmune workup pending though unlikely, vasculitis workup also pending, f/u additional renal recs  -Pulm HTN, mild overload on CT, PND symptoms- cards consulted- likely will need some diuresis IV today and may need to leave on po- will review with cards and renal. PASP elevation likely from undiagnosed MARY, outpt sleep study   -Palpitations-would benefit from outpt telemetry monitoring (holter), cards can hopefully help setup f/u. will start bb here for ddx of PVC vs afib/aflutter with RVR episodes  -HTN- f/u recs, on amlodipine increase to 10mg, we started coreg, also diuresis as above  -Gout- confirm if takes allopurinol (listed as allergy) and if so start, as pt at risk for flare with diuresis  -DVT- switch to SQH with DELMA   -Dispo- no needs when ready, possibly this wknd or Monday depending on renal function    John Noe MD [phone]

## 2020-08-28 NOTE — PROGRESS NOTE ADULT - PROBLEM SELECTOR PLAN 3
Noted on imaging and TTE. No signs of L heart failure on echo. No history of or evidence of chronic hypoxic lung disease. Likely group 4 or 5 etiology of pulmonary hypertension.   -cardio consult  -f/u rheumatology labs    #elevated troponin  Trop 0.03, BNP 1856 on arrival to ED.  (0.03>0.02>0.03) EKG and imaging with signs of acute ischemic changes. Likely elevated 2/2 demand ischemia.  -trend troponins Noted on imaging and TTE. No signs of L heart failure on echo. No history of or evidence of chronic hypoxic lung disease or history of DVTs (neg doppler). Potential MARY cause of hypertension.  -f/u cardio outpatient for management  -f/u rheumatology labs    #elevated troponin  Trop 0.03, BNP 1856 on arrival to ED.  (0.03>0.02>0.03) EKG and imaging with signs of acute ischemic changes. Likely elevated 2/2 demand ischemia.  -trend troponins Noted on imaging and TTE. No signs of L heart failure on echo. Mild pulmonary venous congestion and bibasilar pleural effusion on CT. No history of or evidence of chronic hypoxic lung disease or history of DVTs (neg doppler). Potential MARY cause of hypertension.  -cards consulted - considering IV diuresis for mild overload on CT  -f/u rheumatology labs    #elevated troponin  Trop 0.03, BNP 1856 on arrival to ED.  (0.03>0.02>0.03) EKG and imaging with signs of acute ischemic changes. Likely elevated 2/2 demand ischemia.  -trend troponins

## 2020-08-28 NOTE — PROGRESS NOTE ADULT - PROBLEM SELECTOR PLAN 1
Patient with new onset intermittent palpitations x2 days with episodes lasting >1 hour. Ddx includes MARY, panic attack, thyroid dysfunction (with less likely pheochromocytoma)  - f/u TSH  - rec outpatient f/u with cardio and consider Halter monitor Patient with new onset intermittent palpitations x2 days with episodes lasting >1 hour. Ddx includes MARY, anxiety induced, thyroid dysfunction. More likely MARY or anxiety induced due to increased stress from father's passing  - f/u TSH  - rec outpatient f/u with cardio and consider Halter monitor Patient with new onset intermittent palpitations x2 days with episodes lasting >1 hour. Ddx includes MARY, anxiety induced, thyroid dysfunction. More likely MARY or anxiety induced due to increased stress from father's passing  - f/u TSH  - rec outpatient f/u with cardio and consider Holter monitor Patient with new onset intermittent palpitations x2 days with episodes lasting >1 hour. Ddx includes MARY, anxiety induced, thyroid dysfunction. More likely MARY or anxiety induced due to increased stress from father's recent passing.  - f/u TSH  - rec outpatient f/u with cardio and consider Holter monitor

## 2020-08-28 NOTE — DISCHARGE NOTE PROVIDER - HOSPITAL COURSE
45 year old male with PMHx of gout who presented with SOB and intermittent palpitations x 2 days found to have pulmonary hypertension and renal insufficiency likely glomerulonephritis 2/2 strep viral infection.        Problem List/Main Diagnoses (system-based):         Inpatient treatment course:         New medications:         Labs to be followed outpatient:         Exam to be followed outpatient: 45 year old male with PMHx of gout who presented with SOB and intermittent palpitations x 2 days found to have pulmonary hypertension and renal insufficiency likely glomerulonephritis 2/2 strep viral infection.        Problem List/Main Diagnoses (system-based):     #Palpitations.      - Patient with new onset intermittent palpitations x2 days with episodes lasting >1 hour. Ddx includes MARY, anxiety induced, thyroid dysfunction. More likely MARY or anxiety induced due to increased stress from father's recent passing.    - resolved as of 8/31 AM    - f/u TSH 1.465 wnl    - rec outpatient f/u with cardio and consider Holter monitor        #Syphilis    - Positive RPR in setting of reported diagnosis and treatment 12 years ago    - No further recs at this time.         #Renal insufficiency    - Patient with no history of kidney disease with elevated creatinine on admission. UA s/f large hematuria and proteinuria. 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    - c/w penicillin V 500 mg PO q12 (day 5/10)    - Renal following will appreciate further recs    - remainder of nephritic work up negative    - trend Cr (9/1 2.75).         #Gout.    - Diagnosed 20 years ago. On allopurinol at home that he takes sporadically. Currently experiencing suspected flare w/ L elbow pain.    - plan methylprednisolone 30mg intra-articular for flare; consult ortho for administration    -c/w allopurinol     -c/w meloxicam.         #Pulmonary hypertension.      - Noted on imaging and TTE. No signs of L heart failure on echo. Mild pulmonary venous congestion and bibasilar pleural effusion on CT. No history of or evidence of chronic hypoxic lung disease or history of DVTs (neg doppler). Potential MARY cause of hypertension.    - cards consulted - considering IV diuresis for mild overload on CT    - f/u rheumatology labs        #elevated troponin    - Trop 0.03, BNP 1856 on arrival to ED.  (0.03>0.02>0.03) EKG and imaging with signs of acute ischemic changes. Likely elevated 2/2 demand ischemia.    - trend troponins.         #HTN (hypertension).     - Patient persistently hypertensive with no prior diagnosis of hypertension. Pulmonary hypertension found on TTE and renal insufficiency as potential 2ndary cause of hypertension.    - started on torsemide 20mg qd    - started clonidine 0.1mg q12    - c/w amlodipine 5mg qd     - c/w carvedilol 12.5mg q 12.         #R/O Obstructive sleep apnea.     - Patient describes frequent awakenings at night because of an inability to breathe. Has to position himself with 4 pillows. Unsure if he snores.     - consider outpatient sleep study.        #Chest pain.     - Patient noted in ED chest pain associated with SOB x 2 weeks, not reported on floor. No history of asthma or smoking. Neg doppler for DVT, neg EKG, CT chest not concerning for acute cardiac dysfunction. Elevated trops and BNP may be attributable to demand ischemia. Less likely angina given non radiating, not relieved by rest. No signs of pericarditis on EKG or TTE. Less concern for GERD given no association with meals. CXR not concerning for PNA. Seen by cardiologist last year with normal EKG and stress test per patient. CP maybe secondary to increased strain on heart 2/2 to underlying MARY, undiagnosed renal condition or pulmonary hypertension.     -repeat EKG    -trend troponins.         #Anemia.      - Hgb on admission 12.3, currently no signs of active bleeding (no hematochezia, melena, hemoptysis, hematuria). Normocytic anemia. MCV 87.3 May be 2/2 to new renal insufficiency. Iron studies negative.    - trend CBC    - maintain active T&S    - transfuse if Hgb <7.         Problem/Plan - 9:    ·  Problem: Preventive measure.  Plan: F: tolerating PO, no IVF    E: replete K<4, Mg<2    N: Dash/TLC    VTE Prophylaxis: Lovenox 40 Q24H    GI: not needed    C: Full Code    D: plan to DC today pending IA steroid injection.        Inpatient treatment course:     Patient was admitted to [facility] for work up of pulmonary hypertension causing SoB and renal insufficiency. He was found to have sediment in his urine and a nephritic work up showed a positive anti-streptolysin O in the context of a URI 2 weeks prior to admission and new onset of bloody urine. Patient was started on penicillin and supportive measure were taken to manage HTN. Patient developed cough with low fever that resolved with acetaminophen and hydrocodone/homatropine syrup. Patient then developed an acute gout flare affecting the left elbow, which was treated with ___. He is now stable for discharge and outpatient management.        New medications:     - Torsemide    - Amlodipine    - Carvedilol    - Clonidine    - Penicillin VK        Labs to be followed outpatient:     - Creatinine         Exam to be followed outpatient:     - Cardiovascular workup for palpitations, consider Holter 45 year old male with PMHx of gout who presented with SOB and intermittent palpitations x 2 days found to have pulmonary hypertension and renal insufficiency likely glomerulonephritis 2/2 strep viral infection.        Problem List/Main Diagnoses (system-based):     #Palpitations.      - Patient with new onset intermittent palpitations x2 days with episodes lasting >1 hour. Ddx includes MARY, anxiety induced, thyroid dysfunction. More likely MARY or anxiety induced due to increased stress from father's recent passing.    - resolved as of 8/31 AM    - f/u TSH 1.465 wnl    - rec outpatient f/u with cardio and consider Holter monitor        #Renal insufficiency    - Patient with no history of kidney disease with elevated creatinine on admission. UA s/f large hematuria and proteinuria. 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    - c/w penicillin V 500 mg PO q12 (day 5/10)    - Renal following will appreciate further recs    - remainder of nephritic work up negative    - trend Cr (9/1 2.75).         #Gout.    - Diagnosed 20 years ago. On allopurinol at home that he takes sporadically. Currently experiencing suspected flare w/ L elbow pain.    - plan methylprednisolone 30mg intra-articular for flare; consult ortho for administration    -c/w allopurinol     -c/w meloxicam.         #Pulmonary hypertension.      - Noted on imaging and TTE. No signs of L heart failure on echo. Mild pulmonary venous congestion and bibasilar pleural effusion on CT. No history of or evidence of chronic hypoxic lung disease or history of DVTs (neg doppler). Potential MARY cause of hypertension.    - cards consulted - considering IV diuresis for mild overload on CT    - f/u rheumatology labs        #elevated troponin    - Trop 0.03, BNP 1856 on arrival to ED.  (0.03>0.02>0.03) EKG and imaging with signs of acute ischemic changes. Likely elevated 2/2 demand ischemia.    - trend troponins.         #HTN (hypertension).     - Patient persistently hypertensive with no prior diagnosis of hypertension. Pulmonary hypertension found on TTE and renal insufficiency as potential 2ndary cause of hypertension.    - started on torsemide 20mg qd    - started clonidine 0.1mg q12    - c/w amlodipine 5mg qd     - c/w carvedilol 12.5mg q 12.         #R/O Obstructive sleep apnea.     - Patient describes frequent awakenings at night because of an inability to breathe. Has to position himself with 4 pillows. Unsure if he snores.     - consider outpatient sleep study.        #Chest pain.     - Patient noted in ED chest pain associated with SOB x 2 weeks, not reported on floor. No history of asthma or smoking. Neg doppler for DVT, neg EKG, CT chest not concerning for acute cardiac dysfunction. Elevated trops and BNP may be attributable to demand ischemia. Less likely angina given non radiating, not relieved by rest. No signs of pericarditis on EKG or TTE. Less concern for GERD given no association with meals. CXR not concerning for PNA. Seen by cardiologist last year with normal EKG and stress test per patient. CP maybe secondary to increased strain on heart 2/2 to underlying MARY, undiagnosed renal condition or pulmonary hypertension.     -repeat EKG    -trend troponins.         #Anemia.      - Hgb on admission 12.3, currently no signs of active bleeding (no hematochezia, melena, hemoptysis, hematuria). Normocytic anemia. MCV 87.3 May be 2/2 to new renal insufficiency. Iron studies negative.    - trend CBC    - maintain active T&S    - transfuse if Hgb <7.         #Syphilis    - Positive RPR in setting of reported diagnosis and treatment 12 years ago    - No further recs at this time.         Problem/Plan - 9:    ·  Problem: Preventive measure.  Plan: F: tolerating PO, no IVF    E: replete K<4, Mg<2    N: Dash/TLC    VTE Prophylaxis: Lovenox 40 Q24H    GI: not needed    C: Full Code    D: plan to DC today pending IA steroid injection.        Inpatient treatment course:     Patient was admitted to [facility] for work up of pulmonary hypertension causing SoB and renal insufficiency. He was found to have sediment in his urine and a nephritic work up showed a positive anti-streptolysin O in the context of a URI 2 weeks prior to admission and new onset of bloody urine. Patient was started on penicillin and supportive measure were taken to manage HTN. Patient developed cough with low fever that resolved with acetaminophen and hydrocodone/homatropine syrup. Patient then developed an acute gout flare affecting the left elbow, which was treated with ___. He is now stable for discharge and outpatient management.        New medications:     - Torsemide 20mg PO QD    - Amlodipine 5mg PO QD    - Carvedilol 12.5mg PO q12    - Clonidine 0.1mg PO QD    - Penicillin VK 500mg PO q12 - 10 day course total, 12 doses remaining        Labs to be followed outpatient:     - Creatinine         Exam to be followed outpatient:     - Cardiovascular workup for palpitations, consider Holter 45 year old male with PMHx of gout who presented with SOB and intermittent palpitations x 2 days found to have pulmonary hypertension and renal insufficiency likely glomerulonephritis 2/2 strep viral infection.        Problem List/Main Diagnoses (system-based):     #Renal insufficiency    - Patient with no history of kidney disease with elevated creatinine on admission. UA s/f large hematuria and proteinuria. 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    - c/w penicillin V 500 mg PO q12 (day 5/10)    - Renal following will appreciate further recs    - remainder of nephritic work up negative    - trend Cr (9/1 2.75).         #Gout    - Diagnosed 20 years ago. On allopurinol at home that he takes sporadically.     - Currently experiencing suspected flare w/ L elbow pain.    - rheum consulted for administration of methylprednisolone 30mg intra-articular for flare    - c/w allopurinol 100mg PO q12 outpatient        #Palpitations.      - Patient with new onset intermittent palpitations x2 days with episodes lasting >1 hour. Ddx includes MARY, anxiety induced, thyroid dysfunction. More likely MARY or anxiety due to increased stress from father's recent passing.    - TSH 1.465 wnl    - resolved as of 8/31 AM    - rec outpatient f/u with cardio and consider Holter monitor        #Pulmonary hypertension.      - Noted on imaging and TTE. No signs of L heart failure on echo. Mild pulmonary venous congestion and bibasilar pleural effusion on CT. No history of or evidence of chronic hypoxic lung disease or history of DVTs (neg doppler).     - Etiology possible MARY or paroxysmal nocturnal dyspnea    - cards consulted, considered IV diuresis for mild overload on CT        #elevated troponin    - w/ chest pain and SOB on arrival x 2 weeks, not reported on floor    - Trop 0.03, BNP 1856 on arrival to ED.      - No history of asthma or smoking.     - Neg doppler for DVT, neg EKG, CT chest not concerning for acute cardiac dysfunction. Less likely angina given non radiating, not relieved by rest. No signs of pericarditis on EKG or TTE. Less concern for GERD given no association with meals. CXR not concerning for PNA. Seen by cardiologist last year with normal EKG and stress test per patient. CP maybe secondary to increased strain on heart 2/2 to underlying MARY, undiagnosed renal condition or pulmonary hypertension.     - Troponins trended 0.03>0.02>0.03; EKG and imaging with signs of acute ischemic changes.     - Likely elevated 2/2 demand ischemia.        #HTN (hypertension).     - Patient persistently hypertensive with no prior diagnosis of hypertension. Pulmonary hypertension found on TTE and renal insufficiency as potential 2ndary cause of hypertension.    - patient started on torsemide 20mg qd, clonidine 0.1mg q12, amlodipine 5mg qd, carvedilol 12.5mg q 12.     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     - Patient describes frequent awakenings at night because of an inability to breathe. Has to position himself with 4 pillows. Unsure if he snores.     - consider outpatient sleep study.        #Syphilis    - Positive RPR (titer 1:2) in setting of reported diagnosis and treatment 12 years ago    - No further recs at this time.         #Anemia.      - Hgb on admission 12.3, currently no signs of active bleeding (no hematochezia, melena, hemoptysis, hematuria).     - Normocytic anemia. MCV 87.3 May be 2/2 to new renal insufficiency. Iron studies negative.        Inpatient treatment course:     Patient was admitted to [facility] for work up of pulmonary hypertension causing SoB and for finding of renal insufficiency. He was found to have sediment in his urine and a nephritic work up showed a positive anti-streptolysin O in the context of a URI 2 weeks prior to admission and new onset of bloody urine. Patient was started on penicillin and supportive measures were taken to manage HTN. Patient developed cough with low fever that resolved with acetaminophen and hydrocodone/homatropine syrup. Patient then developed an acute gout flare affecting the left elbow following initiation of torsemide, which was treated with ___. He is now stable for discharge and outpatient management.        New medications:     - Torsemide 20mg PO QD    - Amlodipine 5mg PO QD    - Carvedilol 12.5mg PO q12    - Clonidine 0.1mg PO QD    - Penicillin VK 500mg PO q12 - 10 day course total, 12 doses remaining        Labs to be followed outpatient:     - Creatinine         Exam to be followed outpatient:     - Cardiovascular workup for palpitations, consider Holter 45 year old male with PMHx of gout who presented with SOB and intermittent palpitations x 2 days found to have pulmonary hypertension and renal insufficiency likely glomerulonephritis 2/2 strep viral infection.        Problem List/Main Diagnoses (system-based):     #Renal insufficiency    - Patient with no history of kidney disease with elevated creatinine on admission. UA s/f large hematuria and proteinuria. 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    - c/w penicillin V 500 mg PO q12 (day 5/10)    - Renal following will appreciate further recs    - remainder of nephritic work up negative    - trend Cr (9/1 2.75).         #Gout    - Diagnosed 20 years ago. On allopurinol at home that he takes sporadically.     - Currently experiencing suspected flare w/ L elbow pain.    - rheum consulted for administration of methylprednisolone 30mg intra-articular for flare    - c/w allopurinol 100mg PO q12 outpatient        #Palpitations.      - Patient with new onset intermittent palpitations x2 days with episodes lasting >1 hour. Ddx includes MARY, anxiety induced, thyroid dysfunction. More likely MARY or anxiety due to increased stress from father's recent passing.    - TSH 1.465 wnl    - resolved as of 8/31 AM    - rec outpatient f/u with cardio and consider Holter monitor        #Pulmonary hypertension.      - Noted on imaging and TTE. No signs of L heart failure on echo. Mild pulmonary venous congestion and bibasilar pleural effusion on CT. No history of or evidence of chronic hypoxic lung disease or history of DVTs (neg doppler).     - Etiology possible MARY or paroxysmal nocturnal dyspnea    - cards consulted, considered IV diuresis for mild overload on CT        #elevated troponin    - w/ chest pain and SOB on arrival x 2 weeks, not reported on floor    - Trop 0.03, BNP 1856 on arrival to ED.      - No history of asthma or smoking.     - Neg doppler for DVT, neg EKG, CT chest not concerning for acute cardiac dysfunction. Less likely angina given non radiating, not relieved by rest. No signs of pericarditis on EKG or TTE. Less concern for GERD given no association with meals. CXR not concerning for PNA. Seen by cardiologist last year with normal EKG and stress test per patient. CP maybe secondary to increased strain on heart 2/2 to underlying MARY, undiagnosed renal condition or pulmonary hypertension.     - Troponins trended 0.03>0.02>0.03; EKG and imaging with signs of acute ischemic changes.     - Likely elevated 2/2 demand ischemia.        #HTN (hypertension).     - Patient persistently hypertensive with no prior diagnosis of hypertension. Pulmonary hypertension found on TTE and renal insufficiency as potential 2ndary cause of hypertension.    - patient started on torsemide 20mg qd, clonidine 0.1mg q12, amlodipine 5mg qd, carvedilol 12.5mg q 12.     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     - Patient describes frequent awakenings at night because of an inability to breathe. Has to position himself with 4 pillows. Unsure if he snores.     - consider outpatient sleep study.        #Syphilis    - Positive RPR (titer 1:2) in setting of reported diagnosis and treatment 12 years ago    - No further recs at this time.         #Anemia.      - Hgb on admission 12.3, currently no signs of active bleeding (no hematochezia, melena, hemoptysis, hematuria).     - Normocytic anemia. MCV 87.3 May be 2/2 to new renal insufficiency. Iron studies negative.        Inpatient treatment course:     Patient was admitted to [facility] for work up of pulmonary hypertension causing SoB and for finding of renal insufficiency. He was found to have sediment in his urine and a nephritic work up showed a positive anti-streptolysin O in the context of a URI 2 weeks prior to admission and new onset of bloody urine. Patient was started on penicillin and supportive measures were taken to manage HTN. Patient developed cough with low fever that resolved with acetaminophen and hydrocodone/homatropine syrup. Patient then developed an acute gout flare affecting the left elbow following initiation of torsemide, which was treated with ___. He is now stable for discharge and outpatient management.        New medications:     - Torsemide 20mg PO QD    - Amlodipine 5mg PO QD    - Carvedilol 12.5mg PO q12    - Clonidine 0.1mg PO QD    - Penicillin VK 500mg PO q12 - 10 day course total, 12 doses remaining        Labs to be followed outpatient:     - Creatinine         Exam to be followed outpatient:     - Cardiovascular workup for palpitations, consider Holter 45 year old male with PMHx of gout who presented with SOB and intermittent palpitations x 2 days found to have pulmonary hypertension and renal insufficiency likely glomerulonephritis 2/2 strep viral infection.        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 UA s/f large hematuria and proteinuria. 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 Remainder of nephritic work up negative    - c/w penicillin V 500 mg PO q12 (through 9/7 for total of 10 days)    #Gout    - Diagnosed 20 years ago. On allopurinol at home that he takes sporadically.     - Currently experiencing suspected flare w/ L elbow pain.     - rheum consulted. Started on prednisone 40 mg PO daily 9/1 (continue through 9/4). Per rheum will hold allopurinol for now and will f/u w/ patient as o/p on 9/4        #Palpitations      - Patient with new onset intermittent palpitations x2 days with episodes lasting >1 hour. Likely PND due to overload vs. MARY vs. anxiety due to increased stress from fathers recent passing    - rec outpatient f/u with cardio and consider Holter monitor, as well as outpatient sleep study        #Pulmonary hypertension      - Noted on imaging and TTE. No signs of L heart failure on echo. Mild pulmonary venous congestion and bibasilar pleural effusion on CT. No history of or evidence of chronic hypoxic lung disease or history of DVTs (neg doppler).     - cards consulted and rec outpatient f/u         #elevated troponin    - w/ chest pain and SOB on arrival x 2 weeks, not reported on floor    - Trop 0.03, BNP 1856 on arrival to ED.      - No history of asthma or smoking.     - Neg doppler for DVT, neg EKG, CT chest not concerning for acute cardiac dysfunction. Less likely angina given non radiating, not relieved by rest. No signs of pericarditis on EKG or TTE. Less concern for GERD given no association with meals. CXR not concerning for PNA. Seen by cardiologist last year with normal EKG and stress test per patient. CP maybe secondary to increased strain on heart 2/2 to underlying MARY, undiagnosed renal condition or pulmonary hypertension.     - Troponins trended 0.03>0.02>0.03; EKG and imaging with signs of acute ischemic changes.     - Likely elevated 2/2 demand ischemia.        #HTN (hypertension).     - Patient persistently hypertensive with no prior diagnosis of hypertension. Pulmonary hypertension found on TTE and renal insufficiency as potential 2ndary cause of hypertension.    - patient started on torsemide 20mg qd, clonidine 0.1mg q12, amlodipine 5mg qd, carvedilol 12.5mg q 12.     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     - Patient describes frequent awakenings at night because of an inability to breathe. Has to position himself with 4 pillows. Unsure if he snores.     - consider outpatient sleep study        #Syphilis    - Positive RPR (titer 1:2) in setting of reported diagnosis and treatment 12 years ago    - No further recs at this time.         #Anemia.      - Hgb on admission 12.3, currently no signs of active bleeding (no hematochezia, melena, hemoptysis, hematuria).     - Normocytic anemia. MCV 87.3 May be 2/2 to new renal insufficiency. Iron studies negative.        Inpatient treatment course:     Patient was admitted to [facility] for work up of pulmonary hypertension causing SoB and for finding of renal insufficiency. He was found to have sediment in his urine and a nephritic work up showed a positive anti-streptolysin O in the context of a URI 2 weeks prior to admission and new onset of bloody urine. Patient was started on penicillin and supportive measures were taken to manage HTN. Patient developed cough with low fever that resolved with acetaminophen and hydrocodone/homatropine syrup. Patient then developed an acute gout flare affecting the left elbow following initiation of torsemide, which was treated with ___. He is now stable for discharge and outpatient management.        New medications:     - Torsemide 20mg PO QD    - Amlodipine 5mg PO QD    - Carvedilol 12.5mg PO q12    - Clonidine 0.1mg PO QD    - Penicillin VK 500mg PO q12 - 10 day course total, 12 doses remaining        Labs to be followed outpatient:     - Creatinine         Exam to be followed outpatient:     - Cardiovascular workup for palpitations, consider Holter

## 2020-08-28 NOTE — CONSULT NOTE ADULT - ATTENDING COMMENTS
Pt with pulm htn and palpitations.  Agree to get ECG and collateral info from cardiologist, especially any prior TTEs

## 2020-08-28 NOTE — PROGRESS NOTE ADULT - PROBLEM SELECTOR PLAN 9
F: tolerating PO, no IVF  E: replete K<4, Mg<2  N: Dash/TLC    VTE Prophylaxis: Lovenox 40 Q24H  GI: not needed  C: Full Code  D: RMF F: tolerating PO, no IVF  E: replete K<4, Mg<2  N: Dash/TLC    VTE Prophylaxis: Lovenox 40 Q24H  GI: not needed  C: Full Code  D: likely stay until Monday considering uptrending DELMA and pending renal work up

## 2020-08-28 NOTE — PROGRESS NOTE ADULT - PROBLEM SELECTOR PLAN 7
Hgb on admission 12.3, currently no signs of active bleeding (no hematochezia, melena, hemoptysis, hematuria). Normocytic anemia. MCV 87.3 May be 2/2 to new renal insufficiency. Iron studies done - ferritin 244, TIBC 197.  - trend CBC  - maintain active T&S  - transfuse if Hgb <7 Hgb on admission 12.3, currently no signs of active bleeding (no hematochezia, melena, hemoptysis, hematuria). Normocytic anemia. MCV 87.3 May be 2/2 to new renal insufficiency. Iron studies negative.  - trend CBC  - maintain active T&S  - transfuse if Hgb <7

## 2020-08-28 NOTE — PROGRESS NOTE ADULT - PROBLEM SELECTOR PLAN 5
Patient describes frequent awakenings at night because of an inability to breathe. Has to position himself with 4 pillows. Unsure if he snores.   - consider outpatient sleeping study Patient describes frequent awakenings at night because of an inability to breathe. Has to position himself with 4 pillows. Unsure if he snores.   - consider outpatient sleep study

## 2020-08-28 NOTE — PROGRESS NOTE ADULT - PROBLEM SELECTOR PLAN 4
Patient complaining of chest pain x2 weeks with associated SOB that worsens with deep inspiration. No history of asthma or smoking. Neg doppler for DVT, neg EKG, CT chest not concerning for acute cardiac dysfunction. Elevated trops and BNP may be attributable to demand ischemia. Less likely angina given non radiating, not relieved by rest. No signs of pericarditis on EKG or TTE. Less concern for GERD given no association with meals. Less likely costochondritis given non tender to palpation on exam. CXR not concerning for PNA. CP maybe secondary to increased strain on heart 2/2 to underlying MARY, undiagnosed renal condition or pulmonary hypertension.   - repeat EKG  -trend troponins Patient hypertensive on presentation to /91 and overnight in 180's given labetalol now down to 160's; also hypertensive on presentation to ED on day prior to this admission. No prior diagnosis of hypertension. Pulmonary hypertension found on TTE and renal insufficiency as potential 2ndary cause of hypertension.  - increased to 10mg amlodipine from 5mg yesterday  - started on carvedilol 12.5mg

## 2020-08-28 NOTE — PROGRESS NOTE ADULT - PROBLEM SELECTOR PLAN 6
Patient hypertensive on presentation to /91 and overnight in 180's given labetalol now down to 160's; also hypertensive on presentation to ED on day prior to this admission. No prior diagnosis of hypertension. Pulmonary hypertension found on TTE and renal insufficiency as potential 2ndary cause of hypertension.  -on amlodipine 5mg started yesterday Patient notes chest pain associated with SOB x 2 weeks. No history of asthma or smoking. Neg doppler for DVT, neg EKG, CT chest not concerning for acute cardiac dysfunction. Elevated trops and BNP may be attributable to demand ischemia. Less likely angina given non radiating, not relieved by rest. No signs of pericarditis on EKG or TTE. Less concern for GERD given no association with meals. Less likely costochondritis given non tender to palpation on exam. CXR not concerning for PNA. CP maybe secondary to increased strain on heart 2/2 to underlying MARY, undiagnosed renal condition or pulmonary hypertension.   - repeat EKG  -trend troponins Patient notes chest pain associated with SOB x 2 weeks. No history of asthma or smoking. Neg doppler for DVT, neg EKG, CT chest not concerning for acute cardiac dysfunction. Elevated trops and BNP may be attributable to demand ischemia. Less likely angina given non radiating, not relieved by rest. No signs of pericarditis on EKG or TTE. Less concern for GERD given no association with meals. CXR not concerning for PNA. Seen by cardiologist last year with normal EKG and stress test per patient. CP maybe secondary to increased strain on heart 2/2 to underlying MARY, undiagnosed renal condition or pulmonary hypertension.   - repeat EKG  -trend troponins Patient noted in ED chest pain associated with SOB x 2 weeks, not reported on floor. No history of asthma or smoking. Neg doppler for DVT, neg EKG, CT chest not concerning for acute cardiac dysfunction. Elevated trops and BNP may be attributable to demand ischemia. Less likely angina given non radiating, not relieved by rest. No signs of pericarditis on EKG or TTE. Less concern for GERD given no association with meals. CXR not concerning for PNA. Seen by cardiologist last year with normal EKG and stress test per patient. CP maybe secondary to increased strain on heart 2/2 to underlying MARY, undiagnosed renal condition or pulmonary hypertension.   - repeat EKG  -trend troponins Patient noted in ED chest pain associated with SOB x 2 weeks, not reported on floor. No history of asthma or smoking. Neg doppler for DVT, neg EKG, CT chest not concerning for acute cardiac dysfunction. Elevated trops and BNP may be attributable to demand ischemia. Less likely angina given non radiating, not relieved by rest. No signs of pericarditis on EKG or TTE. Less concern for GERD given no association with meals. CXR not concerning for PNA. Seen by cardiologist last year with normal EKG and stress test per patient. CP maybe secondary to increased strain on heart 2/2 to underlying MARY, undiagnosed renal condition or pulmonary hypertension.   -repeat EKG  -trend troponins

## 2020-08-28 NOTE — PROGRESS NOTE ADULT - PROBLEM SELECTOR PLAN 8
Diagnosed 20 years ago. On allopurinol at home that he takes sporadically. Last flare approximately 1 week ago in left elbow.  -c/w allopurinol   -c/w meloxicam Diagnosed 20 years ago. On allopurinol at home that he takes sporadically. Last flare approximately 1 week ago in left elbow.  -c/w allopurinol - risk for flare if diuresis  -c/w meloxicam

## 2020-08-28 NOTE — PROGRESS NOTE ADULT - ASSESSMENT
45 year old male with PMHx of gout who presented with SOB 2 weeks and intermittent palpitations x 2 days found to have pulmonary hypertension and renal insufficiency. 45 year old male with PMHx of gout who presented with SOB 2 weeks and intermittent palpitations x 2 days found to have pulmonary hypertension and renal insufficiency likely glomerulonephritis 2/2 strep viral infection. 45 year old male with PMHx of gout who presented with SOB and intermittent palpitations x 2 days found to have pulmonary hypertension and renal insufficiency likely glomerulonephritis 2/2 strep viral infection.

## 2020-08-28 NOTE — DISCHARGE NOTE PROVIDER - CARE PROVIDERS DIRECT ADDRESSES
,DirectAddress_Unknown ,[email or web address],DirectAddress_Unknown

## 2020-08-28 NOTE — CONSULT NOTE ADULT - ASSESSMENT
ASSESSMENT:   45M PMH Gout presented initially with dry cough, shortness of breath and palpitations and admitted to medicine for work up DELMA and volume overload with concern for post infectious glomerulonephritis. Cardiology consulted in setting of pulmonary hypertension, PASP 61mmHg and palpitations.    PLAN:  #Pulmonary Hypertension  TTE (8/27): Mild TR, PASP 61 mmHg, RV normal size, EF preserved 68%, normal LV size, wall thickness and no RWMA. ASSESSMENT:   45M PMH Gout presented initially with dry cough, shortness of breath and palpitations and admitted to medicine for work up DELMA and volume overload with concern for post infectious glomerulonephritis. Cardiology consulted in setting of pulmonary hypertension, PASP 61mmHg and palpitations.    PLAN:  #Pulmonary Hypertension  TTE (8/27): Mild TR, PASP 61 mmHg, RV normal size, EF preserved 68%, normal LV size, wall thickness and no RWMA  Exam: Noted crackles on left posterior lung field, JVP mildly elevated  No hx of Smoking  - Continue with IV diuresis with strict I/O and daily standing weights  -When improved volume status would recommend limited echo to re-evaluate  - Patient reports hx of previous echo 1-2 years ago, [place]- does not remember name but will look up- please obtain collateral and past echo report       #Palpitations  Intermittent palpitations, irina at night, EKG NSR (HR 58), no ischemic changes  -Please obtain daily EKGs  - Please obtain collateral from cardiologist      Pending discussion with Consult attending and recommendations final when signed by attending.

## 2020-08-28 NOTE — DISCHARGE NOTE PROVIDER - NSDCCPCAREPLAN_GEN_ALL_CORE_FT
PRINCIPAL DISCHARGE DIAGNOSIS  Diagnosis: Palpitations  Assessment and Plan of Treatment: You came in reporting 2 days of cough, shortness of breath, palpitations, and chest pain. We ran several test to rule out more concerning diagnoses before this resolved on its own. We recommend that you follow up with an outpatient cardiologist who can do a more comprehensive work up and follow you consistently. If you develop sharp chest pain and shortness of breath please call emergency medical services and go to your nearest ER.      SECONDARY DISCHARGE DIAGNOSES  Diagnosis: Gout flare  Assessment and Plan of Treatment: You developed a gout flare in your left elbow while at the hospital. This was likely due to one of the medications you were taking to manage the hpertension you developed as a result of your kidney malfunction. You were treated with ___. Please follow up outpatient with your PCP to address any additional concerns you have regarding the management of your gout.    Diagnosis: Elevated troponin  Assessment and Plan of Treatment: Troponins tned to be elevated when the heart itself is not getting enough oxygen. These were found to be elevated in your blood. However, it is unlikely that this is related to a heart attack given further work up and monitoring.    Diagnosis: Renal insufficiency  Assessment and Plan of Treatment: You were found to have an injuryto your kidney resulting from a previous infection. This caused the darkening of your urine and was managed with penicillin and blood pressure medication due to resulting high blood pressure. Please follow up with your PCP to get lab tests that assess your kidney function in order to monitor for improvement.    Diagnosis: Dyspnea  Assessment and Plan of Treatment: Please see palpitations above. We also recommend that you ask your PCP about getting a sleep study to determine if you are breathing appropriately while you sleep. PRINCIPAL DISCHARGE DIAGNOSIS  Diagnosis: Palpitations  Assessment and Plan of Treatment: You came in reporting 2 days of cough, shortness of breath, palpitations, and chest pain. We ran several test to rule out more concerning diagnoses before this resolved on its own. We recommend that you follow up with an outpatient cardiologist who can do a more comprehensive work up and follow you consistently. If you develop sharp chest pain and shortness of breath please call emergency medical services and go to your nearest ER.      SECONDARY DISCHARGE DIAGNOSES  Diagnosis: Renal insufficiency  Assessment and Plan of Treatment: You were found to have an injuryto your kidney resulting from a previous infection. This caused the darkening of your urine and i being managed with penicillin and blood pressure medication due to resulting high blood pressure. Please complete the course of penicillin you were discharged with (a dose every 12 hours for 12 more doses, with only one dose on your last day) and follow up with Dr. Gutierrez to get lab tests that assess your kidney function in order to monitor for improvement.    Diagnosis: Gout flare  Assessment and Plan of Treatment: You developed a gout flare in your left elbow while at the hospital. This was likely due to one of the medications you were taking to manage the hpertension you developed as a result of your kidney malfunction. You were treated with ___. Please follow up outpatient with your PCP to address any additional concerns you have regarding the management of your gout.    Diagnosis: Dyspnea  Assessment and Plan of Treatment: Please see palpitations above. We also recommend that you ask your PCP about getting a sleep study to determine if you are breathing appropriately while you sleep.    Diagnosis: Elevated troponin  Assessment and Plan of Treatment: Troponins tned to be elevated when the heart itself is not getting enough oxygen. These were found to be elevated in your blood. However, it is unlikely that this is related to a heart attack given further work up and monitoring. PRINCIPAL DISCHARGE DIAGNOSIS  Diagnosis: Palpitations  Assessment and Plan of Treatment: You came in reporting 2 days of cough, shortness of breath, palpitations, and chest pain. We ran several test to rule out more concerning diagnoses before this resolved on its own. We recommend that you follow up with an outpatient cardiologist who can do a more comprehensive work up and follow you consistently. If you develop sharp chest pain and shortness of breath please call emergency medical services and go to your nearest ER.      SECONDARY DISCHARGE DIAGNOSES  Diagnosis: Renal insufficiency  Assessment and Plan of Treatment: You were found to have an injury to your kidney resulting from a previous infection. This caused the darkening of your urine and i being managed with penicillin and blood pressure medication due to resulting high blood pressure. Please complete the course of penicillin you were discharged with (a dose every 12 hours for 12 more doses, with only one dose on your last day) and follow up with Dr. Gutierrez to get lab tests that assess your kidney function in order to monitor for improvement.    Diagnosis: Gout flare  Assessment and Plan of Treatment: You developed a gout flare in your left elbow while at the hospital. This was likely due to one of the medications you were taking to manage the hpertension you developed as a result of your kidney malfunction. You were treated with ___. Please follow up outpatient with your PCP to address any additional concerns you have regarding the management of your gout.    Diagnosis: Dyspnea  Assessment and Plan of Treatment: Please see palpitations above. We also recommend that you ask your PCP about getting a sleep study to determine if you are breathing appropriately while you sleep.    Diagnosis: Elevated troponin  Assessment and Plan of Treatment: Troponins tned to be elevated when the heart itself is not getting enough oxygen. These were found to be elevated in your blood. However, it is unlikely that this is related to a heart attack given further work up and monitoring. PRINCIPAL DISCHARGE DIAGNOSIS  Diagnosis: Renal insufficiency  Assessment and Plan of Treatment: You were found to have an injury to your kidney resulting from a previous infection. 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 This caused the darkening of your urine and is being managed with penicillin and blood pressure medication due to resulting high blood pressure. Please complete the course of penicillin you were discharged with (a dose every 12 hours for 12 more doses, with only one dose on your last day) and follow up with Dr. Gtuierrez on thursday (9/3 at 10 AM, but please call his office tomorrow after 11 AM to confirm)      SECONDARY DISCHARGE DIAGNOSES  Diagnosis: Gout flare  Assessment and Plan of Treatment: You developed a gout flare in your left elbow while at the hospital. This was likely due to one of the medications you were taking to manage the hpertension you developed as a result of your kidney malfunction. You were treated with Prednisone (steroids). Please continue to take the steroids for 3 more days. Please follow up with Dr. Stevens on 9/4 for further management.    Diagnosis: Palpitations  Assessment and Plan of Treatment: You came in reporting 2 days of cough, shortness of breath, palpitations, and chest pain. We ran several test to rule out more concerning diagnoses before this resolved on its own. We recommend that you follow up with an outpatient cardiologist who can do a more comprehensive work up and follow you consistently. If you develop sharp chest pain and shortness of breath please call emergency medical services and go to your nearest ER.    Diagnosis: Elevated troponin  Assessment and Plan of Treatment: Troponins tned to be elevated when the heart itself is not getting enough oxygen. These were found to be elevated in your blood. However, it is unlikely that this is related to a heart attack given further work up and monitoring.    Diagnosis: Dyspnea  Assessment and Plan of Treatment: Please see palpitations above. We also recommend that you ask your PCP about getting a sleep study to determine if you are breathing appropriately while you sleep.

## 2020-08-28 NOTE — CONSULT NOTE ADULT - SUBJECTIVE AND OBJECTIVE BOX
HPI:  45 y/p w/PMHx of gout on allopurinol presented for chest discomfort associated with palpitation, he was found to have a new onset DELMA associated with hypertension for which nephrology is consulted.   pt reports feeling well until 2 weeks ago when he started to have high fevers with chills and sore throat, presented to urgent care for a covid check which was negative, he was told that oropharyngeal exam is unremarkable and he most likely does not have strep throat and was sent home with tylenol  pt works at [employer] and has annual exam every year, never had HTN or kidney issues, his Cr 1.8 upon admission which worsened to 2.3 today with UA remarkable for proteinuria and hematuria, currently is symptom free with negative cardiac work-up.  reports taking few doses of ibuprofen for 2 days, denies rash, no recent travel        Review of Systems:  Review of Systems: ROS as per HPI	      Allergies and Intolerances:        Allergies:  	allopurinol: Drug, Unknown    Home Medications:   * Incomplete Medication History as of 27-Aug-2020 17:19 documented in Structured Notes  · 	allopurinol 100 mg oral tablet: Last Dose Taken:  , 1 tab(s) orally 2 times a day  · 	meloxicam 10 mg oral capsule: Last Dose Taken:  , 1 cap(s) orally once a day, As Needed    Patient History:    Past Medical, Past Surgical, and Family History:  PAST MEDICAL HISTORY:  Gout.    PAST SURGICAL HISTORY:  No significant past surgical history.     FAMILY HISTORY:  HF /HTN and DM     Social History:  Social History (marital status, living situation, occupation, tobacco use, alcohol and drug use, and sexual history): Patient lives alone. Never smoker, never recreational drug use.	    VITAL SIGNS:  T(C): 37 (08-28-20 @ 12:37), Max: 37.4 (08-27-20 @ 22:08)  T(F): 98.6 (08-28-20 @ 12:37), Max: 99.4 (08-28-20 @ 05:49)  HR: 60 (08-28-20 @ 12:37) (60 - 86)  BP: 161/88 (08-28-20 @ 12:37) (161/88 - 184/86)  RR: 16 (08-28-20 @ 12:37) (15 - 18)  SpO2: 94% (08-28-20 @ 12:37) (93% - 98%)    PHYSICAL EXAM:  Constitutional: WDWN resting comfortably in bed; NAD  ENT: MMM  Neck: supple; no JVD  Respiratory: CTA B/L  Cardiac: +S1/S2; RRR  Gastrointestinal: soft, NT/ND  Neurologic: AAOx3; CNII-XII grossly intact; no focal deficits    .  LABS:                         11.4   9.52  )-----------( 226      ( 28 Aug 2020 05:57 )             33.6     08-28    139  |  107  |  28<H>  ----------------------------<  96  4.2   |  22  |  2.32<H>    Ca    8.7      28 Aug 2020 05:57  Phos  4.1     08-28  Mg     2.1     08-28    TPro  7.6  /  Alb  3.3  /  TBili  1.0  /  DBili  x   /  AST  25  /  ALT  18  /  AlkPhos  68  08-28    PT/INR - ( 27 Aug 2020 11:16 )   PT: 13.6 sec;   INR: 1.14          PTT - ( 27 Aug 2020 11:16 )  PTT:31.8 sec  Urinalysis Basic - ( 27 Aug 2020 16:48 )    Color: Yellow / Appearance: Clear / SG: >=1.030 / pH: x  Gluc: x / Ketone: NEGATIVE  / Bili: Negative / Urobili: 0.2 E.U./dL   Blood: x / Protein: >=300 mg/dL / Nitrite: NEGATIVE   Leuk Esterase: NEGATIVE / RBC: > 10 /HPF / WBC 5-10 /HPF   Sq Epi: x / Non Sq Epi: 5-10 /HPF / Bacteria: Present /HPF      CARDIAC MARKERS ( 27 Aug 2020 23:04 )  x     / 0.03 ng/mL / x     / x     / x      CARDIAC MARKERS ( 27 Aug 2020 11:16 )  x     / 0.03 ng/mL / 580 U/L / x     / 4.4 ng/mL    RADIOLOGY, EKG & ADDITIONAL TESTS: Reviewed.   US 8/27:  Right kidney:  12 cm. No renal mass, hydronephrosis or calculi.  Left kidney:  12.7 cm. No renal mass, hydronephrosis or calculi.  Urinary bladder: Within normal limits.  Color and spectral Doppler reveals normal, symmetric blood flow throughout both kidneys.  Peak aortic velocity is 112 cm/sec.  IVC/Renal Veins: Patent  no evidence of renal artery stenosis

## 2020-08-28 NOTE — DISCHARGE NOTE PROVIDER - CARE PROVIDER_API CALL
FREDIS EMERSON  [id number]  [address] [place] [address]  Phone: [phone]  Fax: [phone]  Follow Up Time: Eduardo Stevens)  Internal Medicine  [address], [address] [place] [address]  Phone: [phone]  Fax: [phone]  Follow Up Time:     Devon Gutierrez  INTERNAL MEDICINE  [address], [place] [address]  Phone: [phone]  Fax: [phone]  Follow Up Time:

## 2020-08-28 NOTE — CONSULT NOTE ADULT - PROBLEM SELECTOR RECOMMENDATION 9
vital/labs and imaging reviewed, active UA with pr and hematuria  Hx and labs c/w nephritic pic given recent sore throat and now new onset DELMA and HTN, low complements and elevated ASO suggestive of recent strep infection  working dx: PSGN  - please start treatment with penicillin and monitor renal function  HTN most likely acute and transient in the setting of nephritis, currently in acceptable range  if renal function shows improvement can hold off on further work-up at this time and continue with PCN  Will follow

## 2020-08-28 NOTE — DISCHARGE NOTE PROVIDER - NSDCFUADDAPPT_GEN_ALL_CORE_FT
Please schedule an appointment with Dr. Stevens for this Friday 9/4  Please call Dr. Velázquez office tomorrow after 11 AM to confirm your appointment for Thursday 9/3 at 10 AM

## 2020-08-28 NOTE — CONSULT NOTE ADULT - ATTENDING COMMENTS
above discussed , and the apparent dx of psgn is welcome news given a generally favorable prognosis.   will start on abx, and agree with use of furosemide for htn and vol xs assoc with gn.    pt can be followed in my office after discharge.

## 2020-08-28 NOTE — CONSULT NOTE ADULT - SUBJECTIVE AND OBJECTIVE BOX
Cardiology Consult    HPI: 45M PMH Gout presenting dry cough, shortness of breath and palpitations. Of note patient had recent illness 2 weeks ago- sore throat, subjective fevers.   Interval History:      OBJECTIVE  Vitals:  T(C): 36.7 (08-28-20 @ 20:20), Max: 37.4 (08-27-20 @ 22:08)  HR: 57 (08-28-20 @ 20:20) (57 - 86)  BP: 165/84 (08-28-20 @ 20:20) (161/88 - 184/86)  RR: 17 (08-28-20 @ 20:20) (16 - 17)  SpO2: 96% (08-28-20 @ 20:20) (93% - 98%)  Wt(kg): --    I/O:  I&O's Summary      PHYSICAL EXAM:  Appearance: NAD. Speaking in full sentences.   HEENT: Conjunctiva clear b/l. Moist oral mucosa. JVP 8-10  Cardiovascular: RRR with no murmurs.  Respiratory: Crackles L lower posterior lung field and decreased breath sounds b/l base  Gastrointestinal: Soft, nontender. Non-distended. Non-rigid.	  Extremities: Trace edema b/l. No erythema b/l. LE WWP b/l, DP intact  Neurologic:  Alert and awake. Moving all extremities. Following commands.   	  LABS:                        11.4   9.52  )-----------( 226      ( 28 Aug 2020 05:57 )             33.6     08-28    139  |  107  |  28<H>  ----------------------------<  96  4.2   |  22  |  2.32<H>    Ca    8.7      28 Aug 2020 05:57  Phos  4.1     08-28  Mg     2.1     08-28    TPro  7.6  /  Alb  3.3  /  TBili  1.0  /  DBili  x   /  AST  25  /  ALT  18  /  AlkPhos  68  08-28    PT/INR - ( 27 Aug 2020 11:16 )   PT: 13.6 sec;   INR: 1.14          PTT - ( 27 Aug 2020 11:16 )  PTT:31.8 sec  Urinalysis Basic - ( 27 Aug 2020 16:48 )    Color: Yellow / Appearance: Clear / SG: >=1.030 / pH: x  Gluc: x / Ketone: NEGATIVE  / Bili: Negative / Urobili: 0.2 E.U./dL   Blood: x / Protein: >=300 mg/dL / Nitrite: NEGATIVE   Leuk Esterase: NEGATIVE / RBC: > 10 /HPF / WBC 5-10 /HPF   Sq Epi: x / Non Sq Epi: 5-10 /HPF / Bacteria: Present /HPF        RADIOLOGY & ADDITIONAL TESTS:  Reviewed .    MEDICATIONS  (STANDING):  carvedilol 12.5 milliGRAM(s) Oral every 12 hours  heparin   Injectable 5000 Unit(s) SubCutaneous every 8 hours  penicillin   milliGRAM(s) Oral every 12 hours    MEDICATIONS  (PRN):  acetaminophen   Tablet .. 650 milliGRAM(s) Oral every 6 hours PRN Mild Pain (1 - 3) Cardiology Consult    HPI: 45M PMH Gout presenting dry cough, shortness of breath and palpitations and admitted to medicine for work up DELMA and volume overload with concern for post infectious glomerulonephritis. Cardiology consulted in setting of pulmonary hypertension, PASP 61mmHg.   Patient denies history of CAD, but family history (mother MI). Denies known hx of CHF or valvular disorders.  Denies autoimmune family history  Interval History:      OBJECTIVE  Vitals:  T(C): 36.7 (08-28-20 @ 20:20), Max: 37.4 (08-27-20 @ 22:08)  HR: 57 (08-28-20 @ 20:20) (57 - 86)  BP: 165/84 (08-28-20 @ 20:20) (161/88 - 184/86)  RR: 17 (08-28-20 @ 20:20) (16 - 17)  SpO2: 96% (08-28-20 @ 20:20) (93% - 98%)  Wt(kg): --    I/O:  I&O's Summary      PHYSICAL EXAM:  Appearance: NAD. Speaking in full sentences.   HEENT: Conjunctiva clear b/l. Moist oral mucosa. JVP 8-10  Cardiovascular: RRR with no murmurs.  Respiratory: Crackles L lower posterior lung field and decreased breath sounds b/l base  Gastrointestinal: Soft, nontender. Non-distended. Non-rigid.	  Extremities: Trace edema b/l. No erythema b/l. LE WWP b/l, DP intact  Neurologic:  Alert and awake. Moving all extremities. Following commands.   	  LABS:                        11.4   9.52  )-----------( 226      ( 28 Aug 2020 05:57 )             33.6     08-28    139  |  107  |  28<H>  ----------------------------<  96  4.2   |  22  |  2.32<H>    Ca    8.7      28 Aug 2020 05:57  Phos  4.1     08-28  Mg     2.1     08-28    TPro  7.6  /  Alb  3.3  /  TBili  1.0  /  DBili  x   /  AST  25  /  ALT  18  /  AlkPhos  68  08-28    PT/INR - ( 27 Aug 2020 11:16 )   PT: 13.6 sec;   INR: 1.14          PTT - ( 27 Aug 2020 11:16 )  PTT:31.8 sec  Urinalysis Basic - ( 27 Aug 2020 16:48 )    Color: Yellow / Appearance: Clear / SG: >=1.030 / pH: x  Gluc: x / Ketone: NEGATIVE  / Bili: Negative / Urobili: 0.2 E.U./dL   Blood: x / Protein: >=300 mg/dL / Nitrite: NEGATIVE   Leuk Esterase: NEGATIVE / RBC: > 10 /HPF / WBC 5-10 /HPF   Sq Epi: x / Non Sq Epi: 5-10 /HPF / Bacteria: Present /HPF        RADIOLOGY & ADDITIONAL TESTS:  Reviewed .    MEDICATIONS  (STANDING):  carvedilol 12.5 milliGRAM(s) Oral every 12 hours  heparin   Injectable 5000 Unit(s) SubCutaneous every 8 hours  penicillin   milliGRAM(s) Oral every 12 hours    MEDICATIONS  (PRN):  acetaminophen   Tablet .. 650 milliGRAM(s) Oral every 6 hours PRN Mild Pain (1 - 3) Cardiology Consult    HPI: 45M PMH Gout presented initially with dry cough, shortness of breath and palpitations and admitted to medicine for work up DELMA and volume overload with concern for post infectious glomerulonephritis. Cardiology consulted in setting of pulmonary hypertension, PASP 61mmHg and palpitations. Patient reported some left chest/flank region pain previously, worse with deep inspiration but resolved with simethicone. Denies dyspnea/chest pain on exertion. States uses 4 pillows at night but does not think this is new, but some PND and associated palpitations. Patient denies history of CAD, but family history (father). Denies known hx of CHF or valvular disorders. Denies hx of DVT/PE, no family history of clotting disorders.  Denies autoimmune family history. Noted recent illness with fevers, sore throat- was covid swabbed and sent home. Patient reports that previously seen by cardiologist (unsure of name but will look up) and had an echo approximately a year ago- he states thinks was told that was normal.     PMH: Gout  Meds: Allopurinol  FHx: Mother- CAD (50s)  Social: Never smoker    OBJECTIVE  Vitals:  T(C): 36.7 (08-28-20 @ 20:20), Max: 37.4 (08-27-20 @ 22:08)  HR: 57 (08-28-20 @ 20:20) (57 - 86)  BP: 165/84 (08-28-20 @ 20:20) (161/88 - 184/86)  RR: 17 (08-28-20 @ 20:20) (16 - 17)  SpO2: 96% (08-28-20 @ 20:20) (93% - 98%)  Wt(kg): --    I/O:  I&O's Summary      PHYSICAL EXAM:  Appearance: NAD. Speaking in full sentences.   HEENT:  Moist oral mucosa. JVP 8-10  Cardiovascular: RRR with no murmurs.  Respiratory: Crackles L lower posterior lung field and decreased breath sounds b/l base though limited by body habitus  Gastrointestinal: Soft, nontender. Non-distended. Non-rigid.	  Extremities: No edema b/l. No erythema b/l. LE WWP b/l, DP intact  Neurologic:  Alert and awake. Moving all extremities. Following commands.   	  LABS:                        11.4   9.52  )-----------( 226      ( 28 Aug 2020 05:57 )             33.6     08-28    139  |  107  |  28<H>  ----------------------------<  96  4.2   |  22  |  2.32<H>    Ca    8.7      28 Aug 2020 05:57  Phos  4.1     08-28  Mg     2.1     08-28    TPro  7.6  /  Alb  3.3  /  TBili  1.0  /  DBili  x   /  AST  25  /  ALT  18  /  AlkPhos  68  08-28    PT/INR - ( 27 Aug 2020 11:16 )   PT: 13.6 sec;   INR: 1.14          PTT - ( 27 Aug 2020 11:16 )  PTT:31.8 sec  Urinalysis Basic - ( 27 Aug 2020 16:48 )    Color: Yellow / Appearance: Clear / SG: >=1.030 / pH: x  Gluc: x / Ketone: NEGATIVE  / Bili: Negative / Urobili: 0.2 E.U./dL   Blood: x / Protein: >=300 mg/dL / Nitrite: NEGATIVE   Leuk Esterase: NEGATIVE / RBC: > 10 /HPF / WBC 5-10 /HPF   Sq Epi: x / Non Sq Epi: 5-10 /HPF / Bacteria: Present /HPF        RADIOLOGY & ADDITIONAL TESTS:    EXAM:  ECHO TTE WO CON COMP W DOPP                          PROCEDURE DATE:  08/27/2020       1. There is mild tricuspid regurgitation. Pulmonary artery systolic pressure (estimated using the tricuspid regurgitant gradient and an estimate of right atrial pressure) is 61 mmHg.   2. The right ventricle is normal in size. Right ventricular systolic function is normal. The tricuspid annular plane systolic excursion (TAPSE) is 35.00 mm (normal >=17 mm). RV tissue Doppler S' is 12.60 cm/s (normal >10 cm/s).   3. The left ventricle is normal in size, wall thickness, and systolic function with a calculated ejection fraction of 68%.   4. No pericardial effusion.        MEDICATIONS  (STANDING):  carvedilol 12.5 milliGRAM(s) Oral every 12 hours  heparin   Injectable 5000 Unit(s) SubCutaneous every 8 hours  penicillin   milliGRAM(s) Oral every 12 hours    MEDICATIONS  (PRN):  acetaminophen   Tablet .. 650 milliGRAM(s) Oral every 6 hours PRN Mild Pain (1 - 3)

## 2020-08-28 NOTE — DISCHARGE NOTE PROVIDER - NSDCMRMEDTOKEN_GEN_ALL_CORE_FT
allopurinol 100 mg oral tablet: 1 tab(s) orally 2 times a day  meloxicam 10 mg oral capsule: 1 cap(s) orally once a day, As Needed amLODIPine 5 mg oral tablet: 1 tab(s) orally every 24 hours  amLODIPine 5 mg oral tablet: 1 tab(s) orally once a day   cloNIDine 0.1 mg oral tablet: 1 tab(s) orally every 12 hours  cloNIDine 0.1 mg oral tablet: 1 tab(s) orally every 12 hours  meloxicam 10 mg oral capsule: 1 cap(s) orally once a day, As Needed  penicillin  VK: 500 milligram(s) orally every 12 hours  penicillin V potassium 500 mg oral tablet: 1 tab(s) orally every 12 hours   predniSONE 20 mg oral tablet: 2 tab(s) orally every 24 hours  predniSONE 20 mg oral tablet: 2 tab(s) orally once a day   torsemide 20 mg oral tablet: 1 tab(s) orally every 24 hours  torsemide 20 mg oral tablet: 1 tab(s) orally every 24 hours

## 2020-08-28 NOTE — PROGRESS NOTE ADULT - SUBJECTIVE AND OBJECTIVE BOX
Subjective/ONE: Patient reported headache and was hypertensive to 180s overnight. Given labetalol and brought down to 160s. On rounds this morning he notes band-like epigastric pain with 3 loose stools since taking labetalol. No dark/bloody stools.    MEDICATIONS  (STANDING):  amLODIPine   Tablet 5 milliGRAM(s) Oral every 24 hours  enoxaparin Injectable 40 milliGRAM(s) SubCutaneous every 24 hours    MEDICATIONS  (PRN):    Allergies:   allopurinol (rash)    Vital Signs Last 24 Hrs  T(C): 37.4 (28 Aug 2020 05:49), Max: 37.4 (27 Aug 2020 11:55)  T(F): 99.4 (28 Aug 2020 05:49), Max: 99.4 (28 Aug 2020 05:49)  HR: 71 (28 Aug 2020 05:49) (58 - 86)  BP: 175/78 (28 Aug 2020 05:49) (165/77 - 184/86)  BP(mean): --  ABP: --  ABP(mean): --  RR: 17 (28 Aug 2020 05:49) (15 - 18)  SpO2: 94% (28 Aug 2020 05:49) (93% - 98%)      PHYSICAL EXAM:  GENERAL: NAD, well-groomed, well-developed lying comfortably in bed  HEAD:  Atraumatic, Normocephalic  EYES: Conjunctiva and sclera clear  HEART: Regular rate and rhythm; No murmurs, rubs, or gallops  RESPIRATORY: CTA B/L, No W/R/R  ABDOMEN:+mild epigastric tenderness; soft, nondistended; Bowel sounds present  NEUROLOGY: A&Ox3, nonfocal, moving all extremities  EXTREMITIES:  2+ Peripheral Pulses, mild swelling to lateral left foot; No clubbing, cyanosis, or edema  SKIN: warm, dry, normal color, no rash or abnormal lesions      LABS:  08-27 @ 11:16 Creatine 580 U/L<H> [30 - 200]  cret                        11.4   9.52  )-----------( 226      ( 28 Aug 2020 05:57 )             33.6     08-28    139  |  107  |  28<H>  ----------------------------<  96  4.2   |  22  |  2.32<H>    Ca    8.7      28 Aug 2020 05:57  Phos  4.1     08-28  Mg     2.1     08-28    TPro  7.6  /  Alb  3.3  /  TBili  1.0  /  DBili  x   /  AST  25  /  ALT  18  /  AlkPhos  68  08-28    PT/INR - ( 27 Aug 2020 11:16 )   PT: 13.6 sec;   INR: 1.14          PTT - ( 27 Aug 2020 11:16 )  PTT:31.8 sec Subjective/ONE: Patient reported headache and was hypertensive to 180s overnight. Given labetalol and brought down to 160s. On rounds this morning he noted band-like epigastric pain with 3 loose stools after taking labetalol, which resolved by late morning. No dark/bloody stools.    MEDICATIONS  (STANDING):  amLODIPine   Tablet 5 milliGRAM(s) Oral every 24 hours  enoxaparin Injectable 40 milliGRAM(s) SubCutaneous every 24 hours    MEDICATIONS  (PRN):    Allergies:   allopurinol (rash)    Vital Signs Last 24 Hrs  T(C): 37.4 (28 Aug 2020 05:49), Max: 37.4 (27 Aug 2020 11:55)  T(F): 99.4 (28 Aug 2020 05:49), Max: 99.4 (28 Aug 2020 05:49)  HR: 71 (28 Aug 2020 05:49) (58 - 86)  BP: 175/78 (28 Aug 2020 05:49) (165/77 - 184/86)  BP(mean): --  ABP: --  ABP(mean): --  RR: 17 (28 Aug 2020 05:49) (15 - 18)  SpO2: 94% (28 Aug 2020 05:49) (93% - 98%)      PHYSICAL EXAM:  GENERAL: NAD, well-groomed, well-developed lying comfortably in bed  HEAD:  Atraumatic, Normocephalic  EYES: Conjunctiva and sclera clear  HEART: Regular rate and rhythm; No murmurs, rubs, or gallops  RESPIRATORY: CTA B/L, No W/R/R  ABDOMEN:+mild epigastric tenderness; soft, nondistended; Bowel sounds present  NEUROLOGY: A&Ox3, nonfocal, moving all extremities  EXTREMITIES:  2+ Peripheral Pulses, mild swelling to lateral left foot; No clubbing, cyanosis, or edema  SKIN: warm, dry, normal color, no rash or abnormal lesions      LABS:  08-27 @ 11:16 Creatine 580 U/L<H> [30 - 200]  cret                        11.4   9.52  )-----------( 226      ( 28 Aug 2020 05:57 )             33.6     08-28    139  |  107  |  28<H>  ----------------------------<  96  4.2   |  22  |  2.32<H>    Ca    8.7      28 Aug 2020 05:57  Phos  4.1     08-28  Mg     2.1     08-28    TPro  7.6  /  Alb  3.3  /  TBili  1.0  /  DBili  x   /  AST  25  /  ALT  18  /  AlkPhos  68  08-28    PT/INR - ( 27 Aug 2020 11:16 )   PT: 13.6 sec;   INR: 1.14          PTT - ( 27 Aug 2020 11:16 )  PTT:31.8 sec Subjective/ONE: Patient reported headache and was hypertensive to 180s overnight. Given labetalol and brought down to 160s. On rounds this morning he noted band-like epigastric pain with 3 loose stools after taking labetalol, which resolved by late morning. Denies melena, CP, SOB, palpitations, dysuria, N/V, D/C. ROS otherwise negative.    MEDICATIONS  (STANDING):  amLODIPine   Tablet 5 milliGRAM(s) Oral every 24 hours  enoxaparin Injectable 40 milliGRAM(s) SubCutaneous every 24 hours    MEDICATIONS  (PRN):    Allergies:   allopurinol (rash)    Vital Signs Last 24 Hrs  T(C): 37.4 (28 Aug 2020 05:49), Max: 37.4 (27 Aug 2020 11:55)  T(F): 99.4 (28 Aug 2020 05:49), Max: 99.4 (28 Aug 2020 05:49)  HR: 71 (28 Aug 2020 05:49) (58 - 86)  BP: 175/78 (28 Aug 2020 05:49) (165/77 - 184/86)  BP(mean): --  ABP: --  ABP(mean): --  RR: 17 (28 Aug 2020 05:49) (15 - 18)  SpO2: 94% (28 Aug 2020 05:49) (93% - 98%)      PHYSICAL EXAM:  GENERAL: NAD, well-groomed, well-developed lying comfortably in bed  HEAD:  Atraumatic, Normocephalic  EYES: Conjunctiva and sclera clear  HEART: Regular rate and rhythm; No murmurs, rubs, or gallops  RESPIRATORY: CTA B/L, No W/R/R  ABDOMEN:+mild epigastric tenderness; soft, nondistended; Bowel sounds present  NEUROLOGIC: A&Ox3, nonfocal, moving all extremities  EXTREMITIES:  2+ Peripheral Pulses, mild swelling to lateral left foot; No clubbing, cyanosis, or edema  SKIN: warm, dry, normal color, no rash or abnormal lesions      LABS:  08-27 @ 11:16 Creatine 580 U/L<H> [30 - 200]  cret                        11.4   9.52  )-----------( 226      ( 28 Aug 2020 05:57 )             33.6     08-28    139  |  107  |  28<H>  ----------------------------<  96  4.2   |  22  |  2.32<H>    Ca    8.7      28 Aug 2020 05:57  Phos  4.1     08-28  Mg     2.1     08-28    TPro  7.6  /  Alb  3.3  /  TBili  1.0  /  DBili  x   /  AST  25  /  ALT  18  /  AlkPhos  68  08-28    PT/INR - ( 27 Aug 2020 11:16 )   PT: 13.6 sec;   INR: 1.14          PTT - ( 27 Aug 2020 11:16 )  PTT:31.8 sec

## 2020-08-28 NOTE — PROGRESS NOTE ADULT - PROBLEM SELECTOR PLAN 2
Patient with no history of kidney disease now with elevated creatinine 2.32 (>1.91), GFR 37 (<47). UA s/f large hematuria and proteinuria. Also with elevated creatinine kinase 580.   Patient describes increased urinary frequency recently (poss 2/2 prostate enlargement on US pelvis). Also with new onset LE edema worse in left than right leg (doppler neg DVT).   Given BMI status, concern for new onset DM. Glucose wnl, A1C 5.9.     Ddx: poss nephritic or nephoritic disease. Consider nephritic picture c/w hypertension, large blood in UA, dec GFR - recent viral infection 2 weeks ago. Also nephoritic with UA proteinuria, edema, but normal albumin and lipids. Also concern for lupus, ANCA associated vasculitis or renal failure secondary to syphilis, HIV or hepatitis.  Less likely renal artery stenosis.  -consult nephro  -f/u UA  -f/u Urine Na, Urine Creatinine  -f/u urine total protein  -f/u urine protein: creatine ratio  -f/u serum renin  -f/u serum steven  -f/u ANCA  -f/u KIRAN  -f/u C3, C4 levels  -HIV test - negative  -hepatitis panel - negative  -f/u RPR  -consider renal artery Doppler Patient with no history of kidney disease now with elevated creatinine 2.32 (>1.91), GFR 37 (<47). UA s/f large hematuria and proteinuria. Also with elevated creatinine kinase 580.   Patient describes increased urinary frequency recently (poss 2/2 prostate enlargement on US pelvis). Also with new onset LE edema worse in left than right leg (doppler neg DVT).   Given BMI status, concern for new onset DM. Glucose wnl, A1C 5.9.     Ddx: poss nephritic or nephoritic disease. Consider nephritic picture c/w hypertension, large blood in UA, dec GFR - recent viral infection 2 weeks ago. Also nephoritic with UA proteinuria, edema, but normal albumin and lipids. Also consider lupus, ANCA associated vasculitis or renal failure secondary to syphilis, HIV or hepatitis.  Less likely renal artery stenosis.  -consult nephro  -f/u UA  -f/u Urine Na, Urine Creatinine  -f/u urine total protein  -f/u urine protein: creatine ratio  -f/u serum renin  -f/u serum steven  -f/u ANCA  -f/u KIRAN  -f/u C3, C4 levels  -HIV test - negative  -hepatitis panel - negative  -f/u RPR  -consider renal artery Doppler Patient with no history of kidney disease now with elevated creatinine 2.32 (>1.91), GFR 37 (<47). UA s/f large hematuria and proteinuria. Also with elevated creatinine kinase 580.   Patient describes increased urinary frequency recently (poss 2/2 prostate enlargement on US pelvis). Also with new onset LE edema worse in left than right leg (doppler neg DVT).     Nephro consulted w/ their recs leaning towards nephritic syndrome (c/w hypertension, large blood in UA, dec GFR, recent viral infx x 2weeks ago (strep?), less likely nephrotic (w/ mild edema, but normal albumin and lipids). Other poss etiologies pending results: lupus, ANCA associated vasculitis or renal failure secondary to syphilis, HIV or hepatitis.    -nephro following  -added on anti-strep O (recent viral infx 2 weeks ago as potential cause)  -f/u UA  -f/u Urine Na, Urine Creatinine  -f/u urine total protein  -f/u urine protein: creatine ratio  -f/u serum renin  -f/u serum steven  -f/u ANCA  -f/u KIRAN  -f/u C3, C4 levels  -HIV test - negative  -hepatitis panel - negative  -f/u RPR Patient with no history of kidney disease now with elevated creatinine 2.32 (>1.91), GFR 37 (<47). UA s/f large hematuria and proteinuria. Also with elevated creatinine kinase 580.   Patient describes increased urinary frequency recently (poss 2/2 prostate enlargement on US pelvis). Also with new onset LE edema worse in left than right leg (doppler neg DVT).     Nephro consulted w/ their recs leaning towards nephritic syndrome (c/w hypertension, large blood in UA, dec GFR, recent viral infx x 2weeks ago (strep?), less likely nephrotic (w/ mild edema, but normal albumin and lipids). Other poss etiologies pending results: lupus, ANCA associated vasculitis or renal failure secondary to syphilis, HIV or hepatitis.    -nephro following - if uptrend Cr without clear etiology consider renal bx Monday  -added on anti-strep O per nephro rec (recent viral infx 2 weeks ago as potential cause; tx with penicillin if positive)  -f/u UA  -f/u Urine Na, Urine Creatinine  -f/u urine total protein  -f/u urine protein: creatine ratio  -f/u serum renin  -f/u serum steven  -f/u ANCA  -f/u KIRAN  -f/u C3, C4 levels  -HIV test - negative  -hepatitis panel - negative  -f/u RPR Patient with no history of kidney disease now with elevated creatinine 2.32 (>1.91), GFR 37 (<47). UA s/f large hematuria and proteinuria. Also with elevated creatinine kinase 580.   Patient describes increased urinary frequency recently (poss 2/2 prostate enlargement on US pelvis). Also with new onset LE edema worse in left than right leg (doppler neg DVT).     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 Working up other etiologies pending results: lupus, ANCA associated vasculitis or renal failure secondary to syphilis, HIV or hepatitis.    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 UA s/f large hematuria and proteinuria. Also with elevated creatinine kinase 580.   Patient describes increased urinary frequency recently (poss 2/2 prostate enlargement on US pelvis). Also with new onset LE edema worse in left than right leg (doppler neg DVT).   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 Working up other etiologies pending results: lupus, ANCA associated vasculitis or renal failure secondary to syphilis, HIV or hepatitis.    -will consider starting penicillin for likely strep-induced glomerulonephritis (+ASO s/p recent viral infx 2 weeks ago)  -nephro following - if uptrend Cr without clear etiology consider renal bx Monday  -f/u UA  -f/u Urine Na, Urine Creatinine  -f/u urine total protein  -f/u urine protein: creatine ratio  -f/u serum renin  -f/u serum steven  -f/u ANCA  -f/u KIRAN  -f/u C3, C4 levels  -HIV test - negative  -hepatitis panel - negative  -f/u RPR

## 2020-08-28 NOTE — CONSULT NOTE ADULT - ASSESSMENT
A/p  44 y/o AA male with pmh of gout on allopurinol and otherwise healthy, presented with SOB, palpitation and hypertension associated with DELMA for which nephrology consulted

## 2020-08-29 DIAGNOSIS — D72.829 ELEVATED WHITE BLOOD CELL COUNT, UNSPECIFIED: ICD-10-CM

## 2020-08-29 LAB
ANION GAP SERPL CALC-SCNC: 12 MMOL/L — SIGNIFICANT CHANGE UP (ref 5–17)
BUN SERPL-MCNC: 33 MG/DL — HIGH (ref 7–23)
CALCIUM SERPL-MCNC: 9 MG/DL — SIGNIFICANT CHANGE UP (ref 8.4–10.5)
CHLORIDE SERPL-SCNC: 106 MMOL/L — SIGNIFICANT CHANGE UP (ref 96–108)
CO2 SERPL-SCNC: 19 MMOL/L — LOW (ref 22–31)
CREAT SERPL-MCNC: 2.57 MG/DL — HIGH (ref 0.5–1.3)
GLUCOSE SERPL-MCNC: 92 MG/DL — SIGNIFICANT CHANGE UP (ref 70–99)
HCT VFR BLD CALC: 33.7 % — LOW (ref 39–50)
HGB BLD-MCNC: 11.7 G/DL — LOW (ref 13–17)
MAGNESIUM SERPL-MCNC: 2.1 MG/DL — SIGNIFICANT CHANGE UP (ref 1.6–2.6)
MCHC RBC-ENTMCNC: 30.4 PG — SIGNIFICANT CHANGE UP (ref 27–34)
MCHC RBC-ENTMCNC: 34.7 GM/DL — SIGNIFICANT CHANGE UP (ref 32–36)
MCV RBC AUTO: 87.5 FL — SIGNIFICANT CHANGE UP (ref 80–100)
NRBC # BLD: 0 /100 WBCS — SIGNIFICANT CHANGE UP (ref 0–0)
PHOSPHATE SERPL-MCNC: 3.7 MG/DL — SIGNIFICANT CHANGE UP (ref 2.5–4.5)
PLATELET # BLD AUTO: 213 K/UL — SIGNIFICANT CHANGE UP (ref 150–400)
POTASSIUM SERPL-MCNC: 4.5 MMOL/L — SIGNIFICANT CHANGE UP (ref 3.5–5.3)
POTASSIUM SERPL-SCNC: 4.5 MMOL/L — SIGNIFICANT CHANGE UP (ref 3.5–5.3)
RBC # BLD: 3.85 M/UL — LOW (ref 4.2–5.8)
RBC # FLD: 12.4 % — SIGNIFICANT CHANGE UP (ref 10.3–14.5)
SODIUM SERPL-SCNC: 137 MMOL/L — SIGNIFICANT CHANGE UP (ref 135–145)
WBC # BLD: 10.64 K/UL — HIGH (ref 3.8–10.5)
WBC # FLD AUTO: 10.64 K/UL — HIGH (ref 3.8–10.5)

## 2020-08-29 PROCEDURE — 71045 X-RAY EXAM CHEST 1 VIEW: CPT | Mod: 26

## 2020-08-29 PROCEDURE — 99231 SBSQ HOSP IP/OBS SF/LOW 25: CPT

## 2020-08-29 PROCEDURE — 99233 SBSQ HOSP IP/OBS HIGH 50: CPT | Mod: GC

## 2020-08-29 PROCEDURE — 99232 SBSQ HOSP IP/OBS MODERATE 35: CPT

## 2020-08-29 PROCEDURE — 93010 ELECTROCARDIOGRAM REPORT: CPT

## 2020-08-29 RX ORDER — ACETAMINOPHEN 500 MG
500 TABLET ORAL EVERY 6 HOURS
Refills: 0 | Status: DISCONTINUED | OUTPATIENT
Start: 2020-08-29 | End: 2020-09-01

## 2020-08-29 RX ORDER — LANOLIN ALCOHOL/MO/W.PET/CERES
5 CREAM (GRAM) TOPICAL AT BEDTIME
Refills: 0 | Status: DISCONTINUED | OUTPATIENT
Start: 2020-08-29 | End: 2020-09-01

## 2020-08-29 RX ADMIN — Medication 5 MILLIGRAM(S): at 01:15

## 2020-08-29 RX ADMIN — Medication 500 MILLIGRAM(S): at 17:27

## 2020-08-29 RX ADMIN — Medication 650 MILLIGRAM(S): at 19:46

## 2020-08-29 RX ADMIN — Medication 500 MILLIGRAM(S): at 22:16

## 2020-08-29 RX ADMIN — CARVEDILOL PHOSPHATE 12.5 MILLIGRAM(S): 80 CAPSULE, EXTENDED RELEASE ORAL at 17:27

## 2020-08-29 RX ADMIN — HEPARIN SODIUM 5000 UNIT(S): 5000 INJECTION INTRAVENOUS; SUBCUTANEOUS at 05:34

## 2020-08-29 RX ADMIN — Medication 5 MILLIGRAM(S): at 21:46

## 2020-08-29 RX ADMIN — HEPARIN SODIUM 5000 UNIT(S): 5000 INJECTION INTRAVENOUS; SUBCUTANEOUS at 21:07

## 2020-08-29 RX ADMIN — Medication 500 MILLIGRAM(S): at 05:33

## 2020-08-29 RX ADMIN — Medication 650 MILLIGRAM(S): at 11:55

## 2020-08-29 RX ADMIN — HEPARIN SODIUM 5000 UNIT(S): 5000 INJECTION INTRAVENOUS; SUBCUTANEOUS at 13:58

## 2020-08-29 RX ADMIN — Medication 650 MILLIGRAM(S): at 12:30

## 2020-08-29 RX ADMIN — CARVEDILOL PHOSPHATE 12.5 MILLIGRAM(S): 80 CAPSULE, EXTENDED RELEASE ORAL at 05:33

## 2020-08-29 RX ADMIN — AMLODIPINE BESYLATE 10 MILLIGRAM(S): 2.5 TABLET ORAL at 05:33

## 2020-08-29 NOTE — PROGRESS NOTE ADULT - PROBLEM SELECTOR PLAN 6
Resolved  Patient noted in ED chest pain associated with SOB x 2 weeks, not reported on floor. No history of asthma or smoking. Neg doppler for DVT, neg EKG, CT chest not concerning for acute cardiac dysfunction. Elevated trops and BNP may be attributable to demand ischemia. Less likely angina given non radiating, not relieved by rest. No signs of pericarditis on EKG or TTE. Less concern for GERD given no association with meals. CXR not concerning for PNA. Seen by cardiologist last year with normal EKG and stress test per patient. CP maybe secondary to increased strain on heart 2/2 to underlying MARY, undiagnosed renal condition or pulmonary hypertension.   -repeat EKG  -trend troponins

## 2020-08-29 NOTE — PROGRESS NOTE ADULT - ASSESSMENT
45 year old male with PMHx of gout who presented with SOB and intermittent palpitations x 2 days found to have pulmonary hypertension and renal insufficiency likely glomerulonephritis 2/2 strep viral infection.

## 2020-08-29 NOTE — PROGRESS NOTE ADULT - SUBJECTIVE AND OBJECTIVE BOX
ONE: None    Subjective:  Pt reports mild headache. Denies blurry vision. Denies CP, SOB, palpitations, dysuria, N/V, D/C. ROS otherwise negative.    MEDICATIONS  (STANDING):  amLODIPine   Tablet 10 milliGRAM(s) Oral every 24 hours  carvedilol 12.5 milliGRAM(s) Oral every 12 hours  heparin   Injectable 5000 Unit(s) SubCutaneous every 8 hours  penicillin   milliGRAM(s) Oral every 12 hours    MEDICATIONS  (PRN):  acetaminophen   Tablet .. 650 milliGRAM(s) Oral every 6 hours PRN Mild Pain (1 - 3)      Allergies:   allopurinol (rash)    Vital Signs Last 24 Hrs  T(C): 36.9 (29 Aug 2020 12:06), Max: 37.1 (29 Aug 2020 05:18)  T(F): 98.4 (29 Aug 2020 12:06), Max: 98.7 (29 Aug 2020 05:18)  HR: 70 (29 Aug 2020 12:06) (57 - 75)  BP: 161/91 (29 Aug 2020 12:06) (161/91 - 174/87)  BP(mean): --  RR: 18 (29 Aug 2020 12:06) (17 - 18)  SpO2: 94% (29 Aug 2020 12:06) (91% - 96%)      PHYSICAL EXAM:  GENERAL: NAD, well-groomed, well-developed lying comfortably in bed  HEAD:  Atraumatic, Normocephalic  EYES: Conjunctiva and sclera clear  HEART: Regular rate and rhythm; No murmurs, rubs, or gallops  RESPIRATORY: CTA B/L, No W/R/R  ABDOMEN:+mild epigastric tenderness; soft, nondistended; Bowel sounds present  NEUROLOGIC: A&Ox3, nonfocal, moving all extremities  EXTREMITIES:  2+ Peripheral Pulses, mild swelling to lateral left foot; No clubbing, cyanosis, or edema  SKIN: warm, dry, normal color, no rash or abnormal lesions  Psych: normal affect      LABS:                        11.7   10.64 )-----------( 213      ( 29 Aug 2020 07:11 )             33.7       137  |  106  |  33<H>  ----------------------------<  92  4.5   |  19<L>  |  2.57<H>    Ca    9.0      29 Aug 2020 07:11  Phos  3.7     08-29  Mg     2.1     08-29    TPro  7.6  /  Alb  3.3  /  TBili  1.0  /  DBili  x   /  AST  25  /  ALT  18  /  AlkPhos  68  08-28    < from: TTE Echo Complete w/o Contrast w/ Doppler (08.27.20 @ 12:14) >  CONCLUSIONS:     1. There is mild tricuspid regurgitation. Pulmonary artery systolic pressure (estimated using the tricuspid regurgitant gradient and an estimate of right atrial pressure) is 61 mmHg.   2. The right ventricle is normal in size. Right ventricular systolic function is normal. The tricuspid annular plane systolic excursion (TAPSE) is 35.00 mm (normal >=17 mm). RV tissue Doppler S' is 12.60 cm/s (normal >10 cm/s).   3. The left ventricle is normal in size, wall thickness, and systolic function with a calculated ejection fraction of 68%.   4. No pericardial effusion.    < end of copied text >  08-29

## 2020-08-29 NOTE — PROGRESS NOTE ADULT - PROBLEM SELECTOR PLAN 9
F: tolerating PO, no IVF  E: replete K<4, Mg<2  N: Dash/TLC    VTE Prophylaxis: Lovenox 40 Q24H  GI: not needed  C: Full Code  D: likely stay until Monday considering uptrending DELMA and pending renal work up

## 2020-08-29 NOTE — PROGRESS NOTE ADULT - PROBLEM SELECTOR PLAN 3
Cardiology following; rec continued IV diuresis with strict I/O and daily standing weights; as well as repeat limited echo when fluid status at baseline

## 2020-08-29 NOTE — PROGRESS NOTE ADULT - PROBLEM SELECTOR PLAN 1
Unclear etiology  Patient with new onset intermittent palpitations x2 days with episodes lasting >1 hour. Ddx includes MARY, anxiety induced, thyroid dysfunction. More likely MARY or anxiety induced due to increased stress from father's recent passing.  - TSH wnl  - Cardiology consulted; rec daily EKG and collateral from outpatient cardiologist

## 2020-08-29 NOTE — PROGRESS NOTE ADULT - SUBJECTIVE AND OBJECTIVE BOX
Patient is a 45y Male seen and evaluated at bedside.       Meds:  acetaminophen   Tablet .. 650 every 6 hours PRN  amLODIPine   Tablet 10 every 24 hours  carvedilol 12.5 every 12 hours  heparin   Injectable 5000 every 8 hours  penicillin   every 12 hours      T(C): , Max: 37.1 (08-29-20 @ 05:18)  T(F): , Max: 98.7 (08-29-20 @ 05:18)  HR: 70 (08-29-20 @ 12:06)  BP: 161/91 (08-29-20 @ 12:06)  BP(mean): --  RR: 18 (08-29-20 @ 12:06)  SpO2: 94% (08-29-20 @ 12:06)  Wt(kg): --      PHYSICAL EXAM:  Constitutional: alert, NAD  ENT: MMM  Neck: supple; no JVD  Respiratory: CTA B/L  Cardiac: +S1/S2; RRR  Gastrointestinal: soft, NT/ND, +BS  Neurologic: AAOx3; CNII-XII grossly intact; no focal deficits      LABS:                        11.7   10.64 )-----------( 213      ( 29 Aug 2020 07:11 )             33.7     08-29    137  |  106  |  33<H>  ----------------------------<  92  4.5   |  19<L>  |  2.57<H>    Ca    9.0      29 Aug 2020 07:11  Phos  3.7     08-29  Mg     2.1     08-29    TPro  7.6  /  Alb  3.3  /  TBili  1.0  /  DBili  x   /  AST  25  /  ALT  18  /  AlkPhos  68  08-28    C3 Complement, Serum: 32 mg/dL <L> [81 - 157] (08-28 @ 14:05)  C4 Complement, Serum: 12 mg/dL <L> [13 - 39] (08-28 @ 14:05)      Urinalysis Basic - ( 27 Aug 2020 16:48 )    Color: Yellow / Appearance: Clear / SG: >=1.030 / pH: x  Gluc: x / Ketone: NEGATIVE  / Bili: Negative / Urobili: 0.2 E.U./dL   Blood: x / Protein: >=300 mg/dL / Nitrite: NEGATIVE   Leuk Esterase: NEGATIVE / RBC: > 10 /HPF / WBC 5-10 /HPF   Sq Epi: x / Non Sq Epi: 5-10 /HPF / Bacteria: Present /HPF      Creatinine, Random Urine: 257 mg/dL (08-28 @ 18:23)  Protein/Creatinine Ratio Calculation: >0.8 Ratio (08-28 @ 18:23)  Sodium, Random Urine: 40 mmol/L (08-28 @ 18:23)        RADIOLOGY & ADDITIONAL STUDIES:    reviewed

## 2020-08-29 NOTE — PROGRESS NOTE ADULT - PROBLEM SELECTOR PLAN 4
Patient hypertensive on presentation to /91 and overnight in 180's given labetalol now down to 160's; also hypertensive on presentation to ED on day prior to this admission. No prior diagnosis of hypertension. Pulmonary hypertension found on TTE and renal insufficiency as potential 2ndary cause of hypertension.  - increased to 10mg amlodipine from 5mg yesterday  - started on carvedilol 12.5mg

## 2020-08-29 NOTE — PROGRESS NOTE ADULT - PROBLEM SELECTOR PLAN 7
Hgb on admission 12.3, currently no signs of active bleeding (no hematochezia, melena, hemoptysis, hematuria). Normocytic anemia. MCV 87.3 May be 2/2 to new renal insufficiency. Iron studies negative.  - trend CBC  - maintain active T&S  - transfuse if Hgb <7

## 2020-08-29 NOTE — PROGRESS NOTE ADULT - PROBLEM SELECTOR PLAN 8
Diagnosed 20 years ago. On allopurinol at home that he takes sporadically. Last flare approximately 1 week ago in left elbow.  -c/w allopurinol - risk for flare if diuresis  -c/w meloxicam

## 2020-08-29 NOTE — PROGRESS NOTE ADULT - PROBLEM SELECTOR PLAN 2
Currently working diagnosis is PSGN  -Renal following; rec repeat urine/creatine ratio  -Cr still uptrending  -maintain strict I/o

## 2020-08-29 NOTE — PROGRESS NOTE ADULT - PROBLEM SELECTOR PLAN 5
Patient describes frequent awakenings at night because of an inability to breathe. Has to position himself with 4 pillows. Unsure if he snores.   - consider outpatient sleep study

## 2020-08-29 NOTE — PROGRESS NOTE ADULT - ASSESSMENT
46 yo AA male with pmh of gout on allopurinol and otherwise healthy, presented with SOB, palpitation and hypertension associated with DELMA for which nephrology consulted.     # DELMA   Hx and labs cw nephritic pic given recent sore throat and now new onset DELMA and HTN, low complements and elevated ASO suggestive of recent strep infection, working dx: PSGN  cw penicillin   monitor renal function  repeat urine protein/creatinine ratio    pending KIRAN, if wnl and if renal function shows improvement can hold off on further work-up at this time and continue with PCN  HTN - currently amlodipine 10 mg po qd, carvedilol 12.5 mg po q12hr  Will monitor closely

## 2020-08-29 NOTE — PROGRESS NOTE ADULT - SUBJECTIVE AND OBJECTIVE BOX
INTERVAL EVENTS:  - Denies palpitations this morning. Has been walking to bathroom but otherwise mostly staying in bed.  - Received lasix 40 IV yesterday, I/O likely unreliable.    MEDICATIONS  (STANDING):  amLODIPine   Tablet 10 milliGRAM(s) Oral every 24 hours  carvedilol 12.5 milliGRAM(s) Oral every 12 hours  heparin   Injectable 5000 Unit(s) SubCutaneous every 8 hours  penicillin   milliGRAM(s) Oral every 12 hours    MEDICATIONS  (PRN):  acetaminophen   Tablet .. 650 milliGRAM(s) Oral every 6 hours PRN Mild Pain (1 - 3)      Vital Signs Last 24 Hrs  T(C): 36.9 (29 Aug 2020 12:06), Max: 37.1 (29 Aug 2020 05:18)  T(F): 98.4 (29 Aug 2020 12:06), Max: 98.7 (29 Aug 2020 05:18)  HR: 70 (29 Aug 2020 12:06) (57 - 75)  BP: 161/91 (29 Aug 2020 12:06) (161/91 - 174/87)  BP(mean): --  RR: 18 (29 Aug 2020 12:06) (17 - 18)  SpO2: 94% (29 Aug 2020 12:06) (91% - 96%)     PHYSICAL EXAM:  GEN: Awake, alert. NAD.   HEENT: NCAT, PERRL, EOMI. Mucosa moist. No JVD.  RESP: CTA b/l decreased at bases likely body habitus  CV: RRR. Normal S1/S2. No m/r/g.  ABD: Soft. NT/ND. BS+  EXT: Warm. Trace edema  NEURO: AAOx3. No focal deficits.     LABS:                        11.7   10.64 )-----------( 213      ( 29 Aug 2020 07:11 )             33.7     08-29    137  |  106  |  33<H>  ----------------------------<  92  4.5   |  19<L>  |  2.57<H>    Ca    9.0      29 Aug 2020 07:11  Phos  3.7     08-29  Mg     2.1     08-29    TPro  7.6  /  Alb  3.3  /  TBili  1.0  /  DBili  x   /  AST  25  /  ALT  18  /  AlkPhos  68  08-28    CARDIAC MARKERS ( 27 Aug 2020 23:04 )  x     / 0.03 ng/mL / x     / x     / x            Urinalysis Basic - ( 27 Aug 2020 16:48 )    Color: Yellow / Appearance: Clear / SG: >=1.030 / pH: x  Gluc: x / Ketone: NEGATIVE  / Bili: Negative / Urobili: 0.2 E.U./dL   Blood: x / Protein: >=300 mg/dL / Nitrite: NEGATIVE   Leuk Esterase: NEGATIVE / RBC: > 10 /HPF / WBC 5-10 /HPF   Sq Epi: x / Non Sq Epi: 5-10 /HPF / Bacteria: Present /HPF      I&O's Summary    29 Aug 2020 07:01  -  29 Aug 2020 15:09  --------------------------------------------------------  IN: 0 mL / OUT: 100 mL / NET: -100 mL      45M PMH Gout presented initially with dry cough, shortness of breath and palpitations and admitted to medicine for work up DELMA and volume overload with concern for post infectious glomerulonephritis. Cardiology consulted in setting of pulmonary hypertension, PASP 61mmHg and palpitations.    PLAN:  #Pulmonary Hypertension  TTE (8/27): Mild TR, PASP 61 mmHg, RV normal size, EF preserved 68%, normal LV size, wall thickness and no RWMA  No hx of Smoking  - Would hold additional IV lasix now  - Can repeat formal TTE   - Patient reports hx of previous echo 1-2 years ago, seen by cardiologist on [facility] [place] side- does not remember name but will look up- please obtain collateral and past echo report     #Palpitations  Intermittent palpitations, irina at night, EKG NSR (HR 58), no ischemic changes  -Please obtain daily EKGs  - Please obtain collateral from cardiologist    Trace Butler MD PGY4  To be discussed with Attending Dr. Gonzales INTERVAL EVENTS:  - Denies palpitations this morning. Has been walking to bathroom but otherwise mostly staying in bed.  - Received lasix 40 IV yesterday, I/O likely unreliable.    MEDICATIONS  (STANDING):  amLODIPine   Tablet 10 milliGRAM(s) Oral every 24 hours  carvedilol 12.5 milliGRAM(s) Oral every 12 hours  heparin   Injectable 5000 Unit(s) SubCutaneous every 8 hours  penicillin   milliGRAM(s) Oral every 12 hours    MEDICATIONS  (PRN):  acetaminophen   Tablet .. 650 milliGRAM(s) Oral every 6 hours PRN Mild Pain (1 - 3)      Vital Signs Last 24 Hrs  T(C): 36.9 (29 Aug 2020 12:06), Max: 37.1 (29 Aug 2020 05:18)  T(F): 98.4 (29 Aug 2020 12:06), Max: 98.7 (29 Aug 2020 05:18)  HR: 70 (29 Aug 2020 12:06) (57 - 75)  BP: 161/91 (29 Aug 2020 12:06) (161/91 - 174/87)  BP(mean): --  RR: 18 (29 Aug 2020 12:06) (17 - 18)  SpO2: 94% (29 Aug 2020 12:06) (91% - 96%)     PHYSICAL EXAM:  GEN: Awake, alert. NAD.   HEENT: NCAT, PERRL, EOMI. Mucosa moist. No JVD.  RESP: CTA b/l decreased at bases likely body habitus  CV: RRR. Normal S1/S2. No m/r/g.  ABD: Soft. NT/ND. BS+  EXT: Warm. Trace edema  NEURO: AAOx3. No focal deficits.     LABS:                        11.7   10.64 )-----------( 213      ( 29 Aug 2020 07:11 )             33.7     08-29    137  |  106  |  33<H>  ----------------------------<  92  4.5   |  19<L>  |  2.57<H>    Ca    9.0      29 Aug 2020 07:11  Phos  3.7     08-29  Mg     2.1     08-29    TPro  7.6  /  Alb  3.3  /  TBili  1.0  /  DBili  x   /  AST  25  /  ALT  18  /  AlkPhos  68  08-28    CARDIAC MARKERS ( 27 Aug 2020 23:04 )  x     / 0.03 ng/mL / x     / x     / x            Urinalysis Basic - ( 27 Aug 2020 16:48 )    Color: Yellow / Appearance: Clear / SG: >=1.030 / pH: x  Gluc: x / Ketone: NEGATIVE  / Bili: Negative / Urobili: 0.2 E.U./dL   Blood: x / Protein: >=300 mg/dL / Nitrite: NEGATIVE   Leuk Esterase: NEGATIVE / RBC: > 10 /HPF / WBC 5-10 /HPF   Sq Epi: x / Non Sq Epi: 5-10 /HPF / Bacteria: Present /HPF      I&O's Summary    29 Aug 2020 07:01  -  29 Aug 2020 15:09  --------------------------------------------------------  IN: 0 mL / OUT: 100 mL / NET: -100 mL      45M PMH Gout presented initially with dry cough, shortness of breath and palpitations and admitted to medicine for work up DELMA and volume overload with concern for post infectious glomerulonephritis. Cardiology consulted in setting of pulmonary hypertension, PASP 61mmHg and palpitations.    PLAN:  #Pulmonary Hypertension  TTE (8/27): Mild TR, PASP 61 mmHg, RV normal size, EF preserved 68%, normal LV size, wall thickness and no RWMA  No hx of Smoking  - Would hold additional IV lasix now  - Can repeat formal TTE. Reasonable to defer to outpatient.   - Patient reports hx of previous echo 1-2 years ago, seen by cardiologist on [facility] side- does not remember name but will look up- please obtain collateral and past echo report     #Palpitations  Intermittent palpitations, irina at night, EKG NSR (HR 58), no ischemic changes  -Please obtain daily EKGs  - Please obtain collateral from cardiologist    Trace Butler MD PGY4  To be discussed with Attending Dr. Gonzales

## 2020-08-29 NOTE — CHART NOTE - NSCHARTNOTEFT_GEN_A_CORE
Notified by ARNALDO Cardona that pt temp 100.4 sublingual. Rectal temp 100.5. Other vitals unchanged from prior,  (increased amlodipine to 10 mg today  and started carvediol 12.5mg), still sating 95% on RA. Patient denied SOB, f/c, chest pain, poly/dysuria, endorsed mild HA. Provided acetaminophen 650 x1 for mild fever/mild HA. Patient AOx4, clear lung sounds b/l. Ordered blood culture x2, urinalysis/U cx and CXR. Wet read by Dr. Philippe that increased congestion not seen on prior study possibly due to known pulm HTN or perhaps small atelectasis. Patient provided incentive spirometer and instructed to use, will likely require re-instruction as patient was sleepy. Will trend vitals to monitor for recurrent fever/decreased O2 sat. Notified by ARNALDO Cardona that pt temp 100.4 sublingual. Rectal temp 100.5. Patient denied SOB, f/c, chest pain, poly/dysuria, endorsed mild HA. Other vitals unchanged from prior,  (increased amlodipine to 10 mg today  and started carvediol 12.5mg), RR still 18, HR still in 70s. Only change in vitals patient 91 % on RA, earlier in day 95% on RA.   Provided acetaminophen 650 x1 for mild fever/mild HA. Patient AOx4, clear lung sounds b/l. Ordered blood culture x2, urinalysis/U cx and CXR. Wet read by Dr. Philippe that increased congestion not seen on prior study possibly due to known pulm HTN or perhaps small atelectasis or small effusion not seen on AP CXR. Patient provided incentive spirometer and instructed to use, will likely require re-instruction as patient was sleepy. Will trend pulse ox, if O2 sat decreases will repeat CXR, obtain ABG and start O2 NC if O2. Notified by ARNALDO Cardona that pt temp 100.4 sublingual. Rectal temp 100.5. Patient denied SOB, f/c, chest pain, poly/dysuria, endorsed mild HA. Other vitals unchanged from prior,  (increased amlodipine to 10 mg today  and started carvediol 12.5mg), RR still 18, HR still in 70s. Only change in vitals patient 91 % on RA, earlier in day 95% on RA.   Provided acetaminophen 650 x1 for mild fever/mild HA. Patient AOx4, clear lung sounds b/l. Ordered blood culture x2, urinalysis/U cx and CXR. Wet read by Dr. Philippe that increased congestion not seen on prior study possibly due to known pulm HTN or perhaps small atelectasis or small effusion not seen on AP CXR. Etiology of fever likely due to atelectasis. Patient provided incentive spirometer and instructed to use, will likely require re-instruction as patient was sleepy. Alternatively increased congestion could be PNA. Will trend pulse ox, if O2 sat decreases will repeat CXR, obtain ABG and start O2 NC, broaden abx.

## 2020-08-30 LAB
ANA TITR SER: NEGATIVE — SIGNIFICANT CHANGE UP
ANION GAP SERPL CALC-SCNC: 11 MMOL/L — SIGNIFICANT CHANGE UP (ref 5–17)
APPEARANCE UR: CLEAR — SIGNIFICANT CHANGE UP
BASOPHILS # BLD AUTO: 0.04 K/UL — SIGNIFICANT CHANGE UP (ref 0–0.2)
BASOPHILS NFR BLD AUTO: 0.4 % — SIGNIFICANT CHANGE UP (ref 0–2)
BILIRUB UR-MCNC: NEGATIVE — SIGNIFICANT CHANGE UP
BUN SERPL-MCNC: 46 MG/DL — HIGH (ref 7–23)
CALCIUM SERPL-MCNC: 9 MG/DL — SIGNIFICANT CHANGE UP (ref 8.4–10.5)
CHLORIDE SERPL-SCNC: 102 MMOL/L — SIGNIFICANT CHANGE UP (ref 96–108)
CO2 SERPL-SCNC: 22 MMOL/L — SIGNIFICANT CHANGE UP (ref 22–31)
COLOR SPEC: YELLOW — SIGNIFICANT CHANGE UP
CREAT ?TM UR-MCNC: 284 MG/DL — SIGNIFICANT CHANGE UP
CREAT SERPL-MCNC: 2.62 MG/DL — HIGH (ref 0.5–1.3)
DIFF PNL FLD: ABNORMAL
EOSINOPHIL # BLD AUTO: 0.11 K/UL — SIGNIFICANT CHANGE UP (ref 0–0.5)
EOSINOPHIL NFR BLD AUTO: 1 % — SIGNIFICANT CHANGE UP (ref 0–6)
GLUCOSE SERPL-MCNC: 78 MG/DL — SIGNIFICANT CHANGE UP (ref 70–99)
GLUCOSE UR QL: NEGATIVE — SIGNIFICANT CHANGE UP
HCT VFR BLD CALC: 35.8 % — LOW (ref 39–50)
HGB BLD-MCNC: 12.3 G/DL — LOW (ref 13–17)
IMM GRANULOCYTES NFR BLD AUTO: 0.4 % — SIGNIFICANT CHANGE UP (ref 0–1.5)
KETONES UR-MCNC: NEGATIVE — SIGNIFICANT CHANGE UP
LEUKOCYTE ESTERASE UR-ACNC: NEGATIVE — SIGNIFICANT CHANGE UP
LYMPHOCYTES # BLD AUTO: 2.71 K/UL — SIGNIFICANT CHANGE UP (ref 1–3.3)
LYMPHOCYTES # BLD AUTO: 24.6 % — SIGNIFICANT CHANGE UP (ref 13–44)
MAGNESIUM SERPL-MCNC: 2.5 MG/DL — SIGNIFICANT CHANGE UP (ref 1.6–2.6)
MCHC RBC-ENTMCNC: 29.6 PG — SIGNIFICANT CHANGE UP (ref 27–34)
MCHC RBC-ENTMCNC: 34.4 GM/DL — SIGNIFICANT CHANGE UP (ref 32–36)
MCV RBC AUTO: 86.1 FL — SIGNIFICANT CHANGE UP (ref 80–100)
MONOCYTES # BLD AUTO: 1.22 K/UL — HIGH (ref 0–0.9)
MONOCYTES NFR BLD AUTO: 11.1 % — SIGNIFICANT CHANGE UP (ref 2–14)
NEUTROPHILS # BLD AUTO: 6.91 K/UL — SIGNIFICANT CHANGE UP (ref 1.8–7.4)
NEUTROPHILS NFR BLD AUTO: 62.5 % — SIGNIFICANT CHANGE UP (ref 43–77)
NITRITE UR-MCNC: NEGATIVE — SIGNIFICANT CHANGE UP
NRBC # BLD: 0 /100 WBCS — SIGNIFICANT CHANGE UP (ref 0–0)
PH UR: 6 — SIGNIFICANT CHANGE UP (ref 5–8)
PHOSPHATE SERPL-MCNC: 3.7 MG/DL — SIGNIFICANT CHANGE UP (ref 2.5–4.5)
PLATELET # BLD AUTO: 236 K/UL — SIGNIFICANT CHANGE UP (ref 150–400)
POTASSIUM SERPL-MCNC: 4.2 MMOL/L — SIGNIFICANT CHANGE UP (ref 3.5–5.3)
POTASSIUM SERPL-SCNC: 4.2 MMOL/L — SIGNIFICANT CHANGE UP (ref 3.5–5.3)
PROT ?TM UR-MCNC: >200 MG/DL — HIGH (ref 0–12)
PROT UR-MCNC: >=300 MG/DL
PROT/CREAT UR-RTO: >0.7 RATIO — HIGH (ref 0–0.2)
RBC # BLD: 4.16 M/UL — LOW (ref 4.2–5.8)
RBC # FLD: 12.1 % — SIGNIFICANT CHANGE UP (ref 10.3–14.5)
SODIUM SERPL-SCNC: 135 MMOL/L — SIGNIFICANT CHANGE UP (ref 135–145)
SP GR SPEC: 1.02 — SIGNIFICANT CHANGE UP (ref 1–1.03)
UROBILINOGEN FLD QL: 0.2 E.U./DL — SIGNIFICANT CHANGE UP
WBC # BLD: 11.03 K/UL — HIGH (ref 3.8–10.5)
WBC # FLD AUTO: 11.03 K/UL — HIGH (ref 3.8–10.5)

## 2020-08-30 PROCEDURE — 99232 SBSQ HOSP IP/OBS MODERATE 35: CPT

## 2020-08-30 PROCEDURE — 71045 X-RAY EXAM CHEST 1 VIEW: CPT | Mod: 26

## 2020-08-30 PROCEDURE — 99233 SBSQ HOSP IP/OBS HIGH 50: CPT | Mod: GC

## 2020-08-30 PROCEDURE — 93010 ELECTROCARDIOGRAM REPORT: CPT

## 2020-08-30 RX ORDER — TRAMADOL HYDROCHLORIDE 50 MG/1
25 TABLET ORAL ONCE
Refills: 0 | Status: DISCONTINUED | OUTPATIENT
Start: 2020-08-30 | End: 2020-08-30

## 2020-08-30 RX ORDER — BENZOCAINE AND MENTHOL 5; 1 G/100ML; G/100ML
1 LIQUID ORAL
Refills: 0 | Status: DISCONTINUED | OUTPATIENT
Start: 2020-08-30 | End: 2020-09-01

## 2020-08-30 RX ORDER — HYDRALAZINE HCL 50 MG
10 TABLET ORAL THREE TIMES A DAY
Refills: 0 | Status: DISCONTINUED | OUTPATIENT
Start: 2020-08-30 | End: 2020-08-31

## 2020-08-30 RX ADMIN — Medication 650 MILLIGRAM(S): at 06:10

## 2020-08-30 RX ADMIN — CARVEDILOL PHOSPHATE 12.5 MILLIGRAM(S): 80 CAPSULE, EXTENDED RELEASE ORAL at 06:11

## 2020-08-30 RX ADMIN — Medication 500 MILLIGRAM(S): at 06:11

## 2020-08-30 RX ADMIN — Medication 5 MILLIGRAM(S): at 21:21

## 2020-08-30 RX ADMIN — AMLODIPINE BESYLATE 10 MILLIGRAM(S): 2.5 TABLET ORAL at 06:12

## 2020-08-30 RX ADMIN — HEPARIN SODIUM 5000 UNIT(S): 5000 INJECTION INTRAVENOUS; SUBCUTANEOUS at 21:20

## 2020-08-30 RX ADMIN — Medication 650 MILLIGRAM(S): at 12:47

## 2020-08-30 RX ADMIN — Medication 10 MILLIGRAM(S): at 21:21

## 2020-08-30 RX ADMIN — CARVEDILOL PHOSPHATE 12.5 MILLIGRAM(S): 80 CAPSULE, EXTENDED RELEASE ORAL at 17:40

## 2020-08-30 RX ADMIN — Medication 500 MILLIGRAM(S): at 17:40

## 2020-08-30 RX ADMIN — Medication 10 MILLIGRAM(S): at 13:43

## 2020-08-30 RX ADMIN — HEPARIN SODIUM 5000 UNIT(S): 5000 INJECTION INTRAVENOUS; SUBCUTANEOUS at 14:00

## 2020-08-30 RX ADMIN — Medication 650 MILLIGRAM(S): at 13:45

## 2020-08-30 RX ADMIN — HEPARIN SODIUM 5000 UNIT(S): 5000 INJECTION INTRAVENOUS; SUBCUTANEOUS at 06:11

## 2020-08-30 NOTE — PROGRESS NOTE ADULT - SUBJECTIVE AND OBJECTIVE BOX
pt states he feels better today  nonoliguric w slowly rising sCr   SBP at 160-170 mmHg        Meds:  acetaminophen   Tablet .. 650 every 6 hours PRN  acetaminophen   Tablet .. 500 every 6 hours PRN  amLODIPine   Tablet 10 every 24 hours  carvedilol 12.5 every 12 hours  heparin   Injectable 5000 every 8 hours  hydrALAZINE 10 three times a day  melatonin 5 at bedtime  penicillin   every 12 hours      T(C): , Max: 38 (20 @ 20:43)  T(F): , Max: 100.4 (20 @ 20:43)  HR: 65 (20 @ 13:27)  BP: 161/86 (20 @ 13:27)  BP(mean): --  RR: 17 (20 @ 13:27)  SpO2: 95% (20 @ 13:27)  Wt(kg): --     @ 07:01  -   @ 07:00  --------------------------------------------------------  IN: 0 mL / OUT: 1000 mL / NET: -1000 mL        PHYSICAL EXAM:  Constitutional: alert, NAD  ENT: MMM  Neck: supple; no JVD  Respiratory: CTA B/L  Cardiac: +S1/S2; RRR  Gastrointestinal: soft, NT/ND, +BS  Neurologic: AAOx3; no focal deficits      LABS:                        11.7   10.64 )-----------( 213      ( 29 Aug 2020 07:11 )             33.7         137  |  106  |  33<H>  ----------------------------<  92  4.5   |  19<L>  |  2.57<H>    Ca    9.0      29 Aug 2020 07:11  Phos  3.7       Mg     2.1               Urinalysis Basic - ( 29 Aug 2020 23:54 )    Color: Yellow / Appearance: Clear / S.025 / pH: x  Gluc: x / Ketone: NEGATIVE  / Bili: Negative / Urobili: 0.2 E.U./dL   Blood: x / Protein: >=300 mg/dL / Nitrite: NEGATIVE   Leuk Esterase: NEGATIVE / RBC: > 10 /HPF / WBC 5-10 /HPF   Sq Epi: x / Non Sq Epi: 0-5 /HPF / Bacteria: Present /HPF      Creatinine, Random Urine: 284 mg/dL ( @ 23:54)  Protein/Creatinine Ratio Calculation: >0.7 Ratio ( @ 23:54)  Creatinine, Random Urine: 257 mg/dL ( @ 18:23)  Protein/Creatinine Ratio Calculation: >0.8 Ratio ( @ 18:23)  Sodium, Random Urine: 40 mmol/L ( @ 18:23)        RADIOLOGY & ADDITIONAL STUDIES:    reviewed

## 2020-08-30 NOTE — PROGRESS NOTE ADULT - PROBLEM SELECTOR PLAN 6
Patient noted in ED chest pain associated with SOB x 2 weeks, not reported on floor. No history of asthma or smoking. Neg doppler for DVT, neg EKG, CT chest not concerning for acute cardiac dysfunction. Elevated trops and BNP may be attributable to demand ischemia. Less likely angina given non radiating, not relieved by rest. No signs of pericarditis on EKG or TTE. Less concern for GERD given no association with meals. CXR not concerning for PNA. Seen by cardiologist last year with normal EKG and stress test per patient. CP maybe secondary to increased strain on heart 2/2 to underlying MARY, undiagnosed renal condition or pulmonary hypertension.   -repeat EKG  -trend troponins

## 2020-08-30 NOTE — PROGRESS NOTE ADULT - PROBLEM SELECTOR PLAN 2
Patient with no history of kidney disease with elevated creatinine on admission. UA s/f large hematuria and proteinuria. 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  -c/w penicillin V 500 mg PO q12  -Renal following will appreciate further recs  -f/u nephritic workup

## 2020-08-30 NOTE — PROGRESS NOTE ADULT - PROBLEM SELECTOR PLAN 4
Patient persistently hypertensive with no prior diagnosis of hypertension. Pulmonary hypertension found on TTE and renal insufficiency as potential 2ndary cause of hypertension.  -started on hydralazine 10 mg TID  - c/w 10mg amlodipine qd   - c/w carvedilol 12.5mg q 12

## 2020-08-30 NOTE — PROGRESS NOTE ADULT - ASSESSMENT
44 yo AA male with pmh of gout on allopurinol and otherwise healthy, presented with SOB, palpitation and hypertension associated with DELMA for which nephrology consulted.     # DELMA, non oliguric   new onset DELMA and HTN, low complements and elevated ASO suggestive of recent strep infection, working dx: PSGN, now on penicillin   continue to monitor renal function, sCr is rising at slower rate now, would expect plateau soon   pending KIRAN, if wnl and if renal function shows improvement can hold off on further work-up at this time and continue with PCN  HTN not well controlled w amlodipine 10 mg po qd, carvedilol 12.5 mg po q12hr, would recommend hydralazine 10 mg po q8hr   Will follow

## 2020-08-30 NOTE — PROGRESS NOTE ADULT - SUBJECTIVE AND OBJECTIVE BOX
OVERNIGHT EVENTS: Patient febrile, fever workup sent, afebrile after tylenol.    SUBJECTIVE / INTERVAL HPI: Patient seen and examined at bedside. C/o fever and chills overnight. Also c/o cough since yesterday. States that he had sever HA yesterday, now improved. Denies HA, CP, SOB, palpitations, abdominal pain, dysuria, LE weakness. All other ROS otherwise negative.    VITAL SIGNS:  Vital Signs Last 24 Hrs  T(C): 36.9 (30 Aug 2020 13:27), Max: 38 (29 Aug 2020 20:43)  T(F): 98.5 (30 Aug 2020 13:), Max: 100.4 (29 Aug 2020 20:43)  HR: 65 (30 Aug 2020 13:) (65 - 77)  BP: 161/86 (30 Aug 2020 13:) (161/86 - 170/82)  BP(mean): --  RR: 17 (30 Aug 2020 13:) (17 - 20)  SpO2: 95% (30 Aug 2020 13:27) (91% - 96%)    20 @ 07:01  -  20 @ 07:00  --------------------------------------------------------  IN: 0 mL / OUT: 1000 mL / NET: -1000 mL    20 @ 07:01  20 @ 18:39  --------------------------------------------------------  IN: 910 mL / OUT: 950 mL / NET: -40 mL        PHYSICAL EXAM:    PHYSICAL EXAM:  GENERAL: WDWN, resting comfortably in bed in NAD  HEAD:  Atraumatic, Normocephalic  EYES: Conjunctiva and sclera clear  HEART: Regular rate and rhythm; No murmurs, rubs, or gallops  RESPIRATORY: CTA B/L, No W/R/R  ABDOMEN: soft, nontender, nondistended; Bowel sounds present  NEUROLOGIC: A&Ox3, nonfocal, moving all extremities  EXTREMITIES:  2+ Peripheral Pulses, mild swelling to lateral left foot; No clubbing, cyanosis, or edema  SKIN: warm, dry, normal color, no rash or abnormal lesions    MEDICATIONS:  MEDICATIONS  (STANDING):  amLODIPine   Tablet 10 milliGRAM(s) Oral every 24 hours  carvedilol 12.5 milliGRAM(s) Oral every 12 hours  heparin   Injectable 5000 Unit(s) SubCutaneous every 8 hours  hydrALAZINE 10 milliGRAM(s) Oral three times a day  melatonin 5 milliGRAM(s) Oral at bedtime  penicillin   milliGRAM(s) Oral every 12 hours    MEDICATIONS  (PRN):  acetaminophen   Tablet .. 650 milliGRAM(s) Oral every 6 hours PRN Mild Pain (1 - 3)  acetaminophen   Tablet .. 500 milliGRAM(s) Oral every 6 hours PRN Temp greater or equal to 38C (100.4F)      ALLERGIES:  Allergies    allopurinol (Unknown)    Intolerances        LABS:                        12.3   11.03 )-----------( 236      ( 30 Aug 2020 15:05 )             35.8         135  |  102  |  46<H>  ----------------------------<  78  4.2   |  22  |  2.62<H>    Ca    9.0      30 Aug 2020 15:05  Phos  3.7       Mg     2.5             Urinalysis Basic - ( 29 Aug 2020 23:54 )    Color: Yellow / Appearance: Clear / S.025 / pH: x  Gluc: x / Ketone: NEGATIVE  / Bili: Negative / Urobili: 0.2 E.U./dL   Blood: x / Protein: >=300 mg/dL / Nitrite: NEGATIVE   Leuk Esterase: NEGATIVE / RBC: > 10 /HPF / WBC 5-10 /HPF   Sq Epi: x / Non Sq Epi: 0-5 /HPF / Bacteria: Present /HPF      CAPILLARY BLOOD GLUCOSE          Culture - Blood (collected 20 @ 22:56)  Source: .Blood Blood  Preliminary Report (20 @ 11:01):    No growth at 12 hours    Culture - Blood (collected 20 @ 22:56)  Source: .Blood Blood  Preliminary Report (08-30-20 @ 11:01):    No growth at 12 hours        RADIOLOGY & ADDITIONAL TESTS: Reviewed.

## 2020-08-30 NOTE — PROGRESS NOTE ADULT - PROBLEM SELECTOR PLAN 1
Patient with new onset intermittent palpitations x2 days with episodes lasting >1 hour. Ddx includes MARY, anxiety induced, thyroid dysfunction. More likely MARY or anxiety induced due to increased stress from father's recent passing.  - f/u TSH  - rec outpatient f/u with cardio and consider Holter monitor

## 2020-08-30 NOTE — PROGRESS NOTE ADULT - PROBLEM SELECTOR PLAN 3
Noted on imaging and TTE. No signs of L heart failure on echo. Mild pulmonary venous congestion and bibasilar pleural effusion on CT. No history of or evidence of chronic hypoxic lung disease or history of DVTs (neg doppler). Potential MARY cause of hypertension.  -cards consulted - considering IV diuresis for mild overload on CT  -f/u rheumatology labs    #elevated troponin  Trop 0.03, BNP 1856 on arrival to ED.  (0.03>0.02>0.03) EKG and imaging with signs of acute ischemic changes. Likely elevated 2/2 demand ischemia.  -trend troponins

## 2020-08-31 LAB
ALDOST SERPL-MCNC: 3.6 NG/DL — SIGNIFICANT CHANGE UP
ANION GAP SERPL CALC-SCNC: 10 MMOL/L — SIGNIFICANT CHANGE UP (ref 5–17)
AUTO DIFF PNL BLD: NEGATIVE — SIGNIFICANT CHANGE UP
AUTO DIFF PNL BLD: NEGATIVE — SIGNIFICANT CHANGE UP
BASOPHILS # BLD AUTO: 0.03 K/UL — SIGNIFICANT CHANGE UP (ref 0–0.2)
BASOPHILS NFR BLD AUTO: 0.3 % — SIGNIFICANT CHANGE UP (ref 0–2)
BUN SERPL-MCNC: 49 MG/DL — HIGH (ref 7–23)
C-ANCA SER-ACNC: NEGATIVE — SIGNIFICANT CHANGE UP
C-ANCA SER-ACNC: NEGATIVE — SIGNIFICANT CHANGE UP
CALCIUM SERPL-MCNC: 8.6 MG/DL — SIGNIFICANT CHANGE UP (ref 8.4–10.5)
CHLORIDE SERPL-SCNC: 103 MMOL/L — SIGNIFICANT CHANGE UP (ref 96–108)
CO2 SERPL-SCNC: 21 MMOL/L — LOW (ref 22–31)
CREAT SERPL-MCNC: 2.7 MG/DL — HIGH (ref 0.5–1.3)
CULTURE RESULTS: NO GROWTH — SIGNIFICANT CHANGE UP
EOSINOPHIL # BLD AUTO: 0.11 K/UL — SIGNIFICANT CHANGE UP (ref 0–0.5)
EOSINOPHIL NFR BLD AUTO: 1.2 % — SIGNIFICANT CHANGE UP (ref 0–6)
GLUCOSE SERPL-MCNC: 94 MG/DL — SIGNIFICANT CHANGE UP (ref 70–99)
HCT VFR BLD CALC: 33 % — LOW (ref 39–50)
HGB BLD-MCNC: 11.2 G/DL — LOW (ref 13–17)
IMM GRANULOCYTES NFR BLD AUTO: 0.3 % — SIGNIFICANT CHANGE UP (ref 0–1.5)
LYMPHOCYTES # BLD AUTO: 1.71 K/UL — SIGNIFICANT CHANGE UP (ref 1–3.3)
LYMPHOCYTES # BLD AUTO: 18.9 % — SIGNIFICANT CHANGE UP (ref 13–44)
MAGNESIUM SERPL-MCNC: 2.4 MG/DL — SIGNIFICANT CHANGE UP (ref 1.6–2.6)
MCHC RBC-ENTMCNC: 29.5 PG — SIGNIFICANT CHANGE UP (ref 27–34)
MCHC RBC-ENTMCNC: 33.9 GM/DL — SIGNIFICANT CHANGE UP (ref 32–36)
MCV RBC AUTO: 86.8 FL — SIGNIFICANT CHANGE UP (ref 80–100)
MONOCYTES # BLD AUTO: 1.24 K/UL — HIGH (ref 0–0.9)
MONOCYTES NFR BLD AUTO: 13.7 % — SIGNIFICANT CHANGE UP (ref 2–14)
NEUTROPHILS # BLD AUTO: 5.92 K/UL — SIGNIFICANT CHANGE UP (ref 1.8–7.4)
NEUTROPHILS NFR BLD AUTO: 65.6 % — SIGNIFICANT CHANGE UP (ref 43–77)
NRBC # BLD: 0 /100 WBCS — SIGNIFICANT CHANGE UP (ref 0–0)
P-ANCA SER-ACNC: NEGATIVE — SIGNIFICANT CHANGE UP
P-ANCA SER-ACNC: NEGATIVE — SIGNIFICANT CHANGE UP
PHOSPHATE SERPL-MCNC: 4.5 MG/DL — SIGNIFICANT CHANGE UP (ref 2.5–4.5)
PLATELET # BLD AUTO: 223 K/UL — SIGNIFICANT CHANGE UP (ref 150–400)
POTASSIUM SERPL-MCNC: 4.4 MMOL/L — SIGNIFICANT CHANGE UP (ref 3.5–5.3)
POTASSIUM SERPL-SCNC: 4.4 MMOL/L — SIGNIFICANT CHANGE UP (ref 3.5–5.3)
RBC # BLD: 3.8 M/UL — LOW (ref 4.2–5.8)
RBC # FLD: 12.2 % — SIGNIFICANT CHANGE UP (ref 10.3–14.5)
SODIUM SERPL-SCNC: 134 MMOL/L — LOW (ref 135–145)
SPECIMEN SOURCE: SIGNIFICANT CHANGE UP
T PALLIDUM AB TITR SER: POSITIVE
WBC # BLD: 9.04 K/UL — SIGNIFICANT CHANGE UP (ref 3.8–10.5)
WBC # FLD AUTO: 9.04 K/UL — SIGNIFICANT CHANGE UP (ref 3.8–10.5)

## 2020-08-31 PROCEDURE — 99233 SBSQ HOSP IP/OBS HIGH 50: CPT | Mod: GC

## 2020-08-31 PROCEDURE — 99233 SBSQ HOSP IP/OBS HIGH 50: CPT

## 2020-08-31 RX ORDER — AMLODIPINE BESYLATE 2.5 MG/1
5 TABLET ORAL EVERY 24 HOURS
Refills: 0 | Status: DISCONTINUED | OUTPATIENT
Start: 2020-08-31 | End: 2020-09-01

## 2020-08-31 RX ADMIN — Medication 650 MILLIGRAM(S): at 12:42

## 2020-08-31 RX ADMIN — HEPARIN SODIUM 5000 UNIT(S): 5000 INJECTION INTRAVENOUS; SUBCUTANEOUS at 05:44

## 2020-08-31 RX ADMIN — Medication 20 MILLIGRAM(S): at 18:09

## 2020-08-31 RX ADMIN — HEPARIN SODIUM 5000 UNIT(S): 5000 INJECTION INTRAVENOUS; SUBCUTANEOUS at 14:23

## 2020-08-31 RX ADMIN — Medication 0.1 MILLIGRAM(S): at 18:09

## 2020-08-31 RX ADMIN — CARVEDILOL PHOSPHATE 12.5 MILLIGRAM(S): 80 CAPSULE, EXTENDED RELEASE ORAL at 05:45

## 2020-08-31 RX ADMIN — BENZOCAINE AND MENTHOL 1 LOZENGE: 5; 1 LIQUID ORAL at 00:19

## 2020-08-31 RX ADMIN — Medication 600 MILLIGRAM(S): at 18:48

## 2020-08-31 RX ADMIN — Medication 10 MILLIGRAM(S): at 05:44

## 2020-08-31 RX ADMIN — HEPARIN SODIUM 5000 UNIT(S): 5000 INJECTION INTRAVENOUS; SUBCUTANEOUS at 21:10

## 2020-08-31 RX ADMIN — Medication 5 MILLIGRAM(S): at 21:10

## 2020-08-31 RX ADMIN — Medication 10 MILLIGRAM(S): at 14:23

## 2020-08-31 RX ADMIN — AMLODIPINE BESYLATE 5 MILLIGRAM(S): 2.5 TABLET ORAL at 18:09

## 2020-08-31 RX ADMIN — Medication 500 MILLIGRAM(S): at 18:10

## 2020-08-31 RX ADMIN — CARVEDILOL PHOSPHATE 12.5 MILLIGRAM(S): 80 CAPSULE, EXTENDED RELEASE ORAL at 18:09

## 2020-08-31 RX ADMIN — Medication 650 MILLIGRAM(S): at 13:35

## 2020-08-31 RX ADMIN — AMLODIPINE BESYLATE 10 MILLIGRAM(S): 2.5 TABLET ORAL at 05:43

## 2020-08-31 RX ADMIN — Medication 500 MILLIGRAM(S): at 05:44

## 2020-08-31 NOTE — PROGRESS NOTE ADULT - PROBLEM SELECTOR PLAN 2
Patient with no history of kidney disease with elevated creatinine on admission. UA s/f large hematuria and proteinuria. 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  -c/w penicillin V 500 mg PO q12  -Renal following will appreciate further recs  -f/u nephritic workup Patient with no history of kidney disease with elevated creatinine on admission. UA s/f large hematuria and proteinuria. 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  -c/w penicillin V 500 mg PO q12  -Renal following will appreciate further recs  -f/u nephritic workup  - trend Cr

## 2020-08-31 NOTE — PROGRESS NOTE ADULT - PROBLEM SELECTOR PLAN 4
Patient persistently hypertensive with no prior diagnosis of hypertension. Pulmonary hypertension found on TTE and renal insufficiency as potential 2ndary cause of hypertension.  -started on hydralazine 10 mg TID  - c/w 10mg amlodipine qd   - c/w carvedilol 12.5mg q 12 Patient persistently hypertensive with no prior diagnosis of hypertension. Pulmonary hypertension found on TTE and renal insufficiency as potential 2ndary cause of hypertension.  - c/w hydralazine 10 mg TID  - c/w 10mg amlodipine qd   - c/w carvedilol 12.5mg q 12

## 2020-08-31 NOTE — PROGRESS NOTE ADULT - PROBLEM SELECTOR PLAN 9
F: tolerating PO, no IVF  E: replete K<4, Mg<2  N: Dash/TLC  VTE Prophylaxis: Lovenox 40 Q24H  GI: not needed  C: Full Code  D: likely stay until Monday considering uptrending DELMA and pending renal work up F: tolerating PO, no IVF  E: replete K<4, Mg<2  N: Dash/TLC  VTE Prophylaxis: Lovenox 40 Q24H  GI: not needed  C: Full Code  D: likely stay until Tuesday pending renal improvement with slowed increase in sCr

## 2020-08-31 NOTE — PROGRESS NOTE ADULT - SUBJECTIVE AND OBJECTIVE BOX
OVERNIGHT EVENTS: No acute events. Patient reports resolution of fever, chills, cough following episode over the weekend. States he woke up with a headache that is 2/10 and left sided. Reports continued dark urine w/ normal frequency.     SUBJECTIVE / INTERVAL HPI: Patient seen and examined at bedside.  Patient reports resolution of fever, chills, cough following episode over the weekend. States he woke up with a headache that is 2/10 and left sided. Reports continued dark urine w/ normal frequency. Reports mild swelling of lower extremities. All other ROS otherwise negative.    VITAL SIGNS:  Vital Signs Last 24 Hrs  T(C): 37.4 (30 Aug 2020 20:10), Max: 37.4 (30 Aug 2020 20:10)  T(F): 99.3 (30 Aug 2020 20:10), Max: 99.3 (30 Aug 2020 20:10)  HR: 72 (30 Aug 2020 20:10) (65 - 72)  BP: 165/94 (30 Aug 2020 20:10) (161/86 - 165/94)  BP(mean): --  RR: 18 (30 Aug 2020 20:10) (17 - 18)  SpO2: 93% (30 Aug 2020 20:10) (93% - 95%)    20 @ 07:  -  20 @ 07:00  --------------------------------------------------------  IN: 0 mL / OUT: 1000 mL / NET: -1000 mL     @ 07:  -   @ 07:00  --------------------------------------------------------  IN: 1210 mL / OUT: 1750 mL / NET: -540 mL      PHYSICAL EXAM:  GENERAL: WDWN, resting comfortably in bed in NAD  HEAD:  Atraumatic, Normocephalic  EYES: Conjunctiva and sclera clear  HEART: Regular rate and rhythm; Normal S1, S2; No murmurs, rubs, or gallops  RESPIRATORY: CTA B/L, No W/R/R  ABDOMEN: soft, nontender, nondistended; Bowel sounds present x4  NEUROLOGIC: A&Ox3, nonfocal, moving all extremities  EXTREMITIES:  2+ Peripheral Pulses, mild swelling to lateral left foot; No clubbing, cyanosis, or edema  SKIN: warm, dry, normal color, no rash or abnormal lesions    MEDICATIONS:  MEDICATIONS  (STANDING):  amLODIPine   Tablet 10 milliGRAM(s) Oral every 24 hours  carvedilol 12.5 milliGRAM(s) Oral every 12 hours  heparin   Injectable 5000 Unit(s) SubCutaneous every 8 hours  hydrALAZINE 10 milliGRAM(s) Oral three times a day  melatonin 5 milliGRAM(s) Oral at bedtime  penicillin   milliGRAM(s) Oral every 12 hours    MEDICATIONS  (PRN):  acetaminophen   Tablet .. 650 milliGRAM(s) Oral every 6 hours PRN Mild Pain (1 - 3)  acetaminophen   Tablet .. 500 milliGRAM(s) Oral every 6 hours PRN Temp greater or equal to 38C (100.4F)  benzocaine 15 mG/menthol 3.6 mG (Sugar-Free) Lozenge 1 Lozenge Oral four times a day PRN Sore Throat    ALLERGIES:  Allergies    allopurinol (Unknown)      LABS:                          11.2   9.04  )-----------( 223      ( 31 Aug 2020 06:37 )             33.0     08-31    134<L>  |  103  |  49<H>  ----------------------------<  94  4.4   |  21<L>  |  2.70<H>    Ca    8.6      31 Aug 2020 06:37  Phos  4.5     08-  Mg     2.4     08-      Urinalysis Basic - ( 29 Aug 2020 23:54 )    Color: Yellow / Appearance: Clear / S.025 / pH: x  Gluc: x / Ketone: NEGATIVE  / Bili: Negative / Urobili: 0.2 E.U./dL   Blood: x / Protein: >=300 mg/dL / Nitrite: NEGATIVE   Leuk Esterase: NEGATIVE / RBC: > 10 /HPF / WBC 5-10 /HPF   Sq Epi: x / Non Sq Epi: 0-5 /HPF / Bacteria: Present /HPF            Culture - Blood (collected 20 @ 22:56)  Source: .Blood Blood  Preliminary Report (20 @ 11:01):    No growth at 12 hours    Culture - Blood (collected 20 @ 22:56)  Source: .Blood Blood  Preliminary Report (20 @ 11:01):    No growth at 12 hours        RADIOLOGY & ADDITIONAL TESTS: Reviewed. OVERNIGHT EVENTS: No acute events. Patient reports resolution of fever, chills, cough following episode over the weekend. States he woke up with a headache that is 2/10 and left sided. Reports continued dark urine w/ normal frequency.     SUBJECTIVE / INTERVAL HPI: Patient seen and examined at bedside.  Patient reports resolution of fever, chills, cough following episode over the weekend. States he woke up with a headache that is 2/10 and left sided. Reports continued dark urine w/ normal frequency. Reports mild swelling of lower extremities. All other ROS otherwise negative.    VITAL SIGNS:  Vital Signs Last 24 Hrs  T(C): 37.4 (30 Aug 2020 20:10), Max: 37.4 (30 Aug 2020 20:10)  T(F): 99.3 (30 Aug 2020 20:10), Max: 99.3 (30 Aug 2020 20:10)  HR: 72 (30 Aug 2020 20:10) (65 - 72)  BP: 165/94 (30 Aug 2020 20:10) (161/86 - 165/94)  BP(mean): --  RR: 18 (30 Aug 2020 20:10) (17 - 18)  SpO2: 93% (30 Aug 2020 20:10) (93% - 95%)    20 @ 07:  -  20 @ 07:00  --------------------------------------------------------  IN: 0 mL / OUT: 1000 mL / NET: -1000 mL     @ 07:  -   @ 07:00  --------------------------------------------------------  IN: 1210 mL / OUT: 1750 mL / NET: -540 mL      PHYSICAL EXAM:  GENERAL: WDWN, resting comfortably in bed in NAD  HEAD:  Atraumatic, Normocephalic  EYES: Conjunctiva and sclera clear  HEART: Regular rate and rhythm; Normal S1, S2; No murmurs, rubs, or gallops  RESPIRATORY: CTA B/L, No W/R/R  ABDOMEN: soft, nontender, nondistended; Bowel sounds present x4  NEUROLOGIC: A&Ox3, nonfocal, moving all extremities  EXTREMITIES:  2+ Peripheral Pulses, mild swelling to lateral left foot; No clubbing, cyanosis, or edema  SKIN: warm, dry, normal color, no rash or abnormal lesions    MEDICATIONS:  MEDICATIONS  (STANDING):  amLODIPine   Tablet 10 milliGRAM(s) Oral every 24 hours  carvedilol 12.5 milliGRAM(s) Oral every 12 hours  heparin   Injectable 5000 Unit(s) SubCutaneous every 8 hours  hydrALAZINE 10 milliGRAM(s) Oral three times a day  melatonin 5 milliGRAM(s) Oral at bedtime  penicillin   milliGRAM(s) Oral every 12 hours    MEDICATIONS  (PRN):  acetaminophen   Tablet .. 650 milliGRAM(s) Oral every 6 hours PRN Mild Pain (1 - 3)  acetaminophen   Tablet .. 500 milliGRAM(s) Oral every 6 hours PRN Temp greater or equal to 38C (100.4F)  benzocaine 15 mG/menthol 3.6 mG (Sugar-Free) Lozenge 1 Lozenge Oral four times a day PRN Sore Throat    ALLERGIES:  Allergies    allopurinol (Unknown)      LABS:                          11.2   9.04  )-----------( 223      ( 31 Aug 2020 06:37 )             33.0     08-31    134<L>  |  103  |  49<H>  ----------------------------<  94  4.4   |  21<L>  |  2.70<H>    Ca    8.6      31 Aug 2020 06:37  Phos  4.5     08-31  Mg     2.4     08-31      Urinalysis Basic - ( 29 Aug 2020 23:54 )    Color: Yellow / Appearance: Clear / S.025 / pH: x  Gluc: x / Ketone: NEGATIVE  / Bili: Negative / Urobili: 0.2 E.U./dL   Blood: x / Protein: >=300 mg/dL / Nitrite: NEGATIVE   Leuk Esterase: NEGATIVE / RBC: > 10 /HPF / WBC 5-10 /HPF   Sq Epi: x / Non Sq Epi: 0-5 /HPF / Bacteria: Present /HPF    Culture - Blood (20 @ 22:56)    Specimen Source: .Blood Blood    Culture Results:   No growth at 1 day.    Culture - Blood (20 @ 22:56)    Specimen Source: .Blood Blood    Culture Results:   No growth at 1 day.      RADIOLOGY & ADDITIONAL TESTS: Reviewed.  < from: Xray Chest 1 View- PORTABLE-Urgent (20 @ 22:26) >  PROCEDURE DATE:  2020          INTERPRETATION:  Clinical History: Fever    Frontal examination of the chest demonstrates cardiomegaly. Prominent bronchovascular markings. Mild congestion cannot be excluded. Degenerative changes thoracic spine    IMPRESSION: Prominent bronchovascular markings. Mild congestion cannot be excluded    < end of copied text > OVERNIGHT EVENTS: No acute events.    SUBJECTIVE / INTERVAL HPI: Patient seen and examined at bedside.  Patient reports resolution of fever, chills, cough following episode over the weekend. States he woke up with a headache that is 2/10 and left sided. Reports continued dark urine w/ normal frequency. Reports mild swelling of lower extremities. All other ROS otherwise negative.    VITAL SIGNS:  Vital Signs Last 24 Hrs  T(C): 37.4 (30 Aug 2020 20:10), Max: 37.4 (30 Aug 2020 20:10)  T(F): 99.3 (30 Aug 2020 20:10), Max: 99.3 (30 Aug 2020 20:10)  HR: 72 (30 Aug 2020 20:10) (65 - 72)  BP: 165/94 (30 Aug 2020 20:10) (161/86 - 165/94)  BP(mean): --  RR: 18 (30 Aug 2020 20:10) (17 - 18)  SpO2: 93% (30 Aug 2020 20:10) (93% - 95%)    20 @ :  -  20 @ 07:00  --------------------------------------------------------  IN: 0 mL / OUT: 1000 mL / NET: -1000 mL     @ 07:01  -  31 @ 07:00  --------------------------------------------------------  IN: 1210 mL / OUT: 1750 mL / NET: -540 mL      PHYSICAL EXAM:  GENERAL: WDWN, resting comfortably in bed in NAD  HEAD:  Atraumatic, Normocephalic  EYES: Conjunctiva and sclera clear  HEART: Regular rate and rhythm; Normal S1, S2; No murmurs, rubs, or gallops  RESPIRATORY: CTA B/L, No W/R/R  ABDOMEN: soft, nontender, nondistended; Bowel sounds present x4  NEUROLOGIC: A&Ox3, nonfocal, moving all extremities  EXTREMITIES:  2+ Peripheral Pulses, mild swelling to lateral left foot; No clubbing, cyanosis, or edema  SKIN: warm, dry, normal color, no rash or abnormal lesions    MEDICATIONS:  MEDICATIONS  (STANDING):  amLODIPine   Tablet 10 milliGRAM(s) Oral every 24 hours  carvedilol 12.5 milliGRAM(s) Oral every 12 hours  heparin   Injectable 5000 Unit(s) SubCutaneous every 8 hours  hydrALAZINE 10 milliGRAM(s) Oral three times a day  melatonin 5 milliGRAM(s) Oral at bedtime  penicillin   milliGRAM(s) Oral every 12 hours    MEDICATIONS  (PRN):  acetaminophen   Tablet .. 650 milliGRAM(s) Oral every 6 hours PRN Mild Pain (1 - 3)  acetaminophen   Tablet .. 500 milliGRAM(s) Oral every 6 hours PRN Temp greater or equal to 38C (100.4F)  benzocaine 15 mG/menthol 3.6 mG (Sugar-Free) Lozenge 1 Lozenge Oral four times a day PRN Sore Throat    ALLERGIES:  Allergies    allopurinol (Unknown)      LABS:                          11.2   9.04  )-----------( 223      ( 31 Aug 2020 06:37 )             33.0     08-31    134<L>  |  103  |  49<H>  ----------------------------<  94  4.4   |  21<L>  |  2.70<H>    Ca    8.6      31 Aug 2020 06:37  Phos  4.5     08-31  Mg     2.4     08-31      Urinalysis Basic - ( 29 Aug 2020 23:54 )    Color: Yellow / Appearance: Clear / S.025 / pH: x  Gluc: x / Ketone: NEGATIVE  / Bili: Negative / Urobili: 0.2 E.U./dL   Blood: x / Protein: >=300 mg/dL / Nitrite: NEGATIVE   Leuk Esterase: NEGATIVE / RBC: > 10 /HPF / WBC 5-10 /HPF   Sq Epi: x / Non Sq Epi: 0-5 /HPF / Bacteria: Present /HPF    Culture - Blood (20 @ 22:56)    Specimen Source: .Blood Blood    Culture Results:   No growth at 1 day.    Culture - Blood (20 @ 22:56)    Specimen Source: .Blood Blood    Culture Results:   No growth at 1 day.      RADIOLOGY & ADDITIONAL TESTS: Reviewed.  < from: Xray Chest 1 View- PORTABLE-Urgent (20 @ 22:26) >  PROCEDURE DATE:  2020          INTERPRETATION:  Clinical History: Fever    Frontal examination of the chest demonstrates cardiomegaly. Prominent bronchovascular markings. Mild congestion cannot be excluded. Degenerative changes thoracic spine    IMPRESSION: Prominent bronchovascular markings. Mild congestion cannot be excluded    < end of copied text >

## 2020-08-31 NOTE — PROGRESS NOTE ADULT - PROBLEM SELECTOR PLAN 1
Patient with new onset intermittent palpitations x2 days with episodes lasting >1 hour. Ddx includes MARY, anxiety induced, thyroid dysfunction. More likely MARY or anxiety induced due to increased stress from father's recent passing.  - f/u TSH  - rec outpatient f/u with cardio and consider Holter monitor Patient with new onset intermittent palpitations x2 days with episodes lasting >1 hour. Ddx includes MARY, anxiety induced, thyroid dysfunction. More likely MARY or anxiety induced due to increased stress from father's recent passing.  - reporting resolution as of 8/31 AM  - f/u TSH 1.465 wnl  - rec outpatient f/u with cardio and consider Holter monitor

## 2020-08-31 NOTE — PROGRESS NOTE ADULT - PROBLEM SELECTOR PLAN 1
Non oliguric DELMA with nephritic pic, c/w PSGN  low complements and elevated ASO suggestive of recent strep infection, started on PCN, renal function is plateauing with acceptable lytes however hypervolemic   would titrate antihypertensives as below  - please cut down amlodipine to 5 mg daily given edema  - start 0.1 mg Clonidine x 2 today and place clonidine patch 0.2 mg/24-hour patch applied once weekly  - start torsemide 20 mg daily  Will follow       - please start treatment with penicillin and monitor renal function  HTN most likely acute and transient in the setting of nephritis, currently in acceptable range  if renal function shows improvement can hold off on further work-up at this time and continue with PCN  Will follow. Non oliguric DELMA with nephritic pic, c/w PSGN  low complements and elevated ASO suggestive of recent strep infection, started on PCN, renal function is plateauing with acceptable lytes however hypervolemic   would titrate antihypertensives as below  - please cut down amlodipine to 5 mg daily given edema  - start 0.1 mg Clonidine x 2 today and place clonidine patch 0.2 mg/24-hour patch applied once weekly  - start torsemide 20 mg daily  Will follow

## 2020-08-31 NOTE — PROGRESS NOTE ADULT - SUBJECTIVE AND OBJECTIVE BOX
O/N Events:  SAAD, HDS  Subjective:  feels well, no complaints, feet puffy, Bp remained in 160/90 mostly  renal function is plateauing for the past 2 days, sl hyponatremic sNa 134    VITALS  Vital Signs Last 24 Hrs  T(C): 37.4 (30 Aug 2020 20:10), Max: 37.4 (30 Aug 2020 20:10)  T(F): 99.3 (30 Aug 2020 20:10), Max: 99.3 (30 Aug 2020 20:10)  HR: 72 (30 Aug 2020 20:10) (65 - 72)  BP: 165/94 (30 Aug 2020 20:10) (161/86 - 165/94)  RR: 18 (30 Aug 2020 20:10) (17 - 18)  SpO2: 93% (30 Aug 2020 20:10) (93% - 95%)    PHYSICAL EXAM  Constitutional: WDWN resting comfortably in bed; NAD  Neck: supple; no JVD  Respiratory: CTA B/L  Cardiac: +S1/S2; RRR  Gastrointestinal: soft, NT/ND  EXt: Warm and puffy no pitting edema present   Neurologic: AAOx3; CNII-XII grossly intact; no focal deficits    MEDICATIONS  (STANDING):  amLODIPine   Tablet 10 milliGRAM(s) Oral every 24 hours  carvedilol 12.5 milliGRAM(s) Oral every 12 hours  heparin   Injectable 5000 Unit(s) SubCutaneous every 8 hours  hydrALAZINE 10 milliGRAM(s) Oral three times a day  melatonin 5 milliGRAM(s) Oral at bedtime  penicillin   milliGRAM(s) Oral every 12 hours    MEDICATIONS  (PRN):  acetaminophen   Tablet .. 650 milliGRAM(s) Oral every 6 hours PRN Mild Pain (1 - 3)  acetaminophen   Tablet .. 500 milliGRAM(s) Oral every 6 hours PRN Temp greater or equal to 38C (100.4F)  benzocaine 15 mG/menthol 3.6 mG (Sugar-Free) Lozenge 1 Lozenge Oral four times a day PRN Sore Throat      LABS                        11.2   9.04  )-----------( 223      ( 31 Aug 2020 06:37 )             33.0     08-31    134<L>  |  103  |  49<H>  ----------------------------<  94  4.4   |  21<L>  |  2.70<H>    Ca    8.6      31 Aug 2020 06:37  Phos  4.5     08-  Mg     2.4     08-    Urinalysis Basic - ( 29 Aug 2020 23:54 )  Color: Yellow / Appearance: Clear / S.025 / pH: x  Gluc: x / Ketone: NEGATIVE  / Bili: Negative / Urobili: 0.2 E.U./dL   Blood: x / Protein: >=300 mg/dL / Nitrite: NEGATIVE   Leuk Esterase: NEGATIVE / RBC: > 10 /HPF / WBC 5-10 /HPF   Sq Epi: x / Non Sq Epi: 0-5 /HPF / Bacteria: Present /HPF

## 2020-08-31 NOTE — PROGRESS NOTE ADULT - ASSESSMENT
A/p  46 y/o AA male with pmh of gout on allopurinol and otherwise healthy, presented with SOB, palpitation and hypertension associated with DELMA for which nephrology consulted

## 2020-09-01 ENCOUNTER — TRANSCRIPTION ENCOUNTER (OUTPATIENT)
Age: 45
End: 2020-09-01

## 2020-09-01 VITALS
OXYGEN SATURATION: 94 % | RESPIRATION RATE: 18 BRPM | SYSTOLIC BLOOD PRESSURE: 157 MMHG | DIASTOLIC BLOOD PRESSURE: 77 MMHG | TEMPERATURE: 99 F | HEART RATE: 77 BPM

## 2020-09-01 DIAGNOSIS — M10.9 GOUT, UNSPECIFIED: ICD-10-CM

## 2020-09-01 LAB
ANION GAP SERPL CALC-SCNC: 10 MMOL/L — SIGNIFICANT CHANGE UP (ref 5–17)
BUN SERPL-MCNC: 59 MG/DL — HIGH (ref 7–23)
CALCIUM SERPL-MCNC: 8.6 MG/DL — SIGNIFICANT CHANGE UP (ref 8.4–10.5)
CHLORIDE SERPL-SCNC: 104 MMOL/L — SIGNIFICANT CHANGE UP (ref 96–108)
CO2 SERPL-SCNC: 21 MMOL/L — LOW (ref 22–31)
CREAT SERPL-MCNC: 2.75 MG/DL — HIGH (ref 0.5–1.3)
CULTURE RESULTS: SIGNIFICANT CHANGE UP
CULTURE RESULTS: SIGNIFICANT CHANGE UP
GLUCOSE SERPL-MCNC: 91 MG/DL — SIGNIFICANT CHANGE UP (ref 70–99)
HCT VFR BLD CALC: 32.8 % — LOW (ref 39–50)
HGB BLD-MCNC: 11.1 G/DL — LOW (ref 13–17)
MCHC RBC-ENTMCNC: 29.2 PG — SIGNIFICANT CHANGE UP (ref 27–34)
MCHC RBC-ENTMCNC: 33.8 GM/DL — SIGNIFICANT CHANGE UP (ref 32–36)
MCV RBC AUTO: 86.3 FL — SIGNIFICANT CHANGE UP (ref 80–100)
NRBC # BLD: 0 /100 WBCS — SIGNIFICANT CHANGE UP (ref 0–0)
PLATELET # BLD AUTO: 236 K/UL — SIGNIFICANT CHANGE UP (ref 150–400)
POTASSIUM SERPL-MCNC: 4.6 MMOL/L — SIGNIFICANT CHANGE UP (ref 3.5–5.3)
POTASSIUM SERPL-SCNC: 4.6 MMOL/L — SIGNIFICANT CHANGE UP (ref 3.5–5.3)
RBC # BLD: 3.8 M/UL — LOW (ref 4.2–5.8)
RBC # FLD: 12.4 % — SIGNIFICANT CHANGE UP (ref 10.3–14.5)
RENIN PLAS-CCNC: 0.31 NG/ML/HR — SIGNIFICANT CHANGE UP (ref 0.17–5.38)
SODIUM SERPL-SCNC: 135 MMOL/L — SIGNIFICANT CHANGE UP (ref 135–145)
SPECIMEN SOURCE: SIGNIFICANT CHANGE UP
SPECIMEN SOURCE: SIGNIFICANT CHANGE UP
WBC # BLD: 8.99 K/UL — SIGNIFICANT CHANGE UP (ref 3.8–10.5)
WBC # FLD AUTO: 8.99 K/UL — SIGNIFICANT CHANGE UP (ref 3.8–10.5)

## 2020-09-01 PROCEDURE — 93976 VASCULAR STUDY: CPT

## 2020-09-01 PROCEDURE — 85610 PROTHROMBIN TIME: CPT

## 2020-09-01 PROCEDURE — 99285 EMERGENCY DEPT VISIT HI MDM: CPT | Mod: 25

## 2020-09-01 PROCEDURE — 84244 ASSAY OF RENIN: CPT

## 2020-09-01 PROCEDURE — 87389 HIV-1 AG W/HIV-1&-2 AB AG IA: CPT

## 2020-09-01 PROCEDURE — 82728 ASSAY OF FERRITIN: CPT

## 2020-09-01 PROCEDURE — 71045 X-RAY EXAM CHEST 1 VIEW: CPT

## 2020-09-01 PROCEDURE — 93005 ELECTROCARDIOGRAM TRACING: CPT

## 2020-09-01 PROCEDURE — 86593 SYPHILIS TEST NON-TREP QUANT: CPT

## 2020-09-01 PROCEDURE — 84443 ASSAY THYROID STIM HORMONE: CPT

## 2020-09-01 PROCEDURE — 84300 ASSAY OF URINE SODIUM: CPT

## 2020-09-01 PROCEDURE — 93306 TTE W/DOPPLER COMPLETE: CPT

## 2020-09-01 PROCEDURE — 71250 CT THORAX DX C-: CPT

## 2020-09-01 PROCEDURE — 87635 SARS-COV-2 COVID-19 AMP PRB: CPT

## 2020-09-01 PROCEDURE — 80074 ACUTE HEPATITIS PANEL: CPT

## 2020-09-01 PROCEDURE — 93970 EXTREMITY STUDY: CPT

## 2020-09-01 PROCEDURE — 36415 COLL VENOUS BLD VENIPUNCTURE: CPT

## 2020-09-01 PROCEDURE — 87633 RESP VIRUS 12-25 TARGETS: CPT

## 2020-09-01 PROCEDURE — 86780 TREPONEMA PALLIDUM: CPT

## 2020-09-01 PROCEDURE — 81001 URINALYSIS AUTO W/SCOPE: CPT

## 2020-09-01 PROCEDURE — 86038 ANTINUCLEAR ANTIBODIES: CPT

## 2020-09-01 PROCEDURE — 86140 C-REACTIVE PROTEIN: CPT

## 2020-09-01 PROCEDURE — 99232 SBSQ HOSP IP/OBS MODERATE 35: CPT

## 2020-09-01 PROCEDURE — 83540 ASSAY OF IRON: CPT

## 2020-09-01 PROCEDURE — 82553 CREATINE MB FRACTION: CPT

## 2020-09-01 PROCEDURE — 82533 TOTAL CORTISOL: CPT

## 2020-09-01 PROCEDURE — 87086 URINE CULTURE/COLONY COUNT: CPT

## 2020-09-01 PROCEDURE — 76857 US EXAM PELVIC LIMITED: CPT

## 2020-09-01 PROCEDURE — 86160 COMPLEMENT ANTIGEN: CPT

## 2020-09-01 PROCEDURE — 87040 BLOOD CULTURE FOR BACTERIA: CPT

## 2020-09-01 PROCEDURE — 83036 HEMOGLOBIN GLYCOSYLATED A1C: CPT

## 2020-09-01 PROCEDURE — 85379 FIBRIN DEGRADATION QUANT: CPT

## 2020-09-01 PROCEDURE — 86431 RHEUMATOID FACTOR QUANT: CPT

## 2020-09-01 PROCEDURE — 80061 LIPID PANEL: CPT

## 2020-09-01 PROCEDURE — 85025 COMPLETE CBC W/AUTO DIFF WBC: CPT

## 2020-09-01 PROCEDURE — 83550 IRON BINDING TEST: CPT

## 2020-09-01 PROCEDURE — 99239 HOSP IP/OBS DSCHRG MGMT >30: CPT | Mod: GC

## 2020-09-01 PROCEDURE — 86060 ANTISTREPTOLYSIN O TITER: CPT

## 2020-09-01 PROCEDURE — 85730 THROMBOPLASTIN TIME PARTIAL: CPT

## 2020-09-01 PROCEDURE — 83605 ASSAY OF LACTIC ACID: CPT

## 2020-09-01 PROCEDURE — 96374 THER/PROPH/DIAG INJ IV PUSH: CPT

## 2020-09-01 PROCEDURE — 84100 ASSAY OF PHOSPHORUS: CPT

## 2020-09-01 PROCEDURE — 84156 ASSAY OF PROTEIN URINE: CPT

## 2020-09-01 PROCEDURE — 86592 SYPHILIS TEST NON-TREP QUAL: CPT

## 2020-09-01 PROCEDURE — 82570 ASSAY OF URINE CREATININE: CPT

## 2020-09-01 PROCEDURE — 82550 ASSAY OF CK (CPK): CPT

## 2020-09-01 PROCEDURE — 83735 ASSAY OF MAGNESIUM: CPT

## 2020-09-01 PROCEDURE — 80053 COMPREHEN METABOLIC PANEL: CPT

## 2020-09-01 PROCEDURE — 76700 US EXAM ABDOM COMPLETE: CPT

## 2020-09-01 PROCEDURE — 99253 IP/OBS CNSLTJ NEW/EST LOW 45: CPT

## 2020-09-01 PROCEDURE — 83880 ASSAY OF NATRIURETIC PEPTIDE: CPT

## 2020-09-01 PROCEDURE — 85652 RBC SED RATE AUTOMATED: CPT

## 2020-09-01 PROCEDURE — 84484 ASSAY OF TROPONIN QUANT: CPT

## 2020-09-01 PROCEDURE — 84466 ASSAY OF TRANSFERRIN: CPT

## 2020-09-01 PROCEDURE — 85027 COMPLETE CBC AUTOMATED: CPT

## 2020-09-01 PROCEDURE — 82088 ASSAY OF ALDOSTERONE: CPT

## 2020-09-01 PROCEDURE — 80048 BASIC METABOLIC PNL TOTAL CA: CPT

## 2020-09-01 RX ORDER — PENICILLIN V POTASSIUM 250 MG
1 TABLET ORAL
Qty: 10 | Refills: 0
Start: 2020-09-01 | End: 2020-09-05

## 2020-09-01 RX ORDER — AMLODIPINE BESYLATE 2.5 MG/1
1 TABLET ORAL
Qty: 0 | Refills: 0 | DISCHARGE
Start: 2020-09-01

## 2020-09-01 RX ORDER — ALLOPURINOL 300 MG
100 TABLET ORAL EVERY 12 HOURS
Refills: 0 | Status: DISCONTINUED | OUTPATIENT
Start: 2020-09-01 | End: 2020-09-01

## 2020-09-01 RX ORDER — PENICILLIN V POTASSIUM 250 MG
500 TABLET ORAL
Qty: 0 | Refills: 0 | DISCHARGE
Start: 2020-09-01

## 2020-09-01 RX ORDER — AMLODIPINE BESYLATE 2.5 MG/1
1 TABLET ORAL
Qty: 30 | Refills: 0
Start: 2020-09-01 | End: 2020-09-30

## 2020-09-01 RX ORDER — ALLOPURINOL 300 MG
1 TABLET ORAL
Qty: 0 | Refills: 0 | DISCHARGE

## 2020-09-01 RX ADMIN — Medication 500 MILLIGRAM(S): at 17:09

## 2020-09-01 RX ADMIN — Medication 20 MILLIGRAM(S): at 17:10

## 2020-09-01 RX ADMIN — Medication 40 MILLIGRAM(S): at 17:09

## 2020-09-01 RX ADMIN — Medication 650 MILLIGRAM(S): at 15:12

## 2020-09-01 RX ADMIN — Medication 600 MILLIGRAM(S): at 05:20

## 2020-09-01 RX ADMIN — AMLODIPINE BESYLATE 5 MILLIGRAM(S): 2.5 TABLET ORAL at 17:10

## 2020-09-01 RX ADMIN — CARVEDILOL PHOSPHATE 12.5 MILLIGRAM(S): 80 CAPSULE, EXTENDED RELEASE ORAL at 17:10

## 2020-09-01 RX ADMIN — CARVEDILOL PHOSPHATE 12.5 MILLIGRAM(S): 80 CAPSULE, EXTENDED RELEASE ORAL at 05:17

## 2020-09-01 RX ADMIN — Medication 0.1 MILLIGRAM(S): at 05:21

## 2020-09-01 RX ADMIN — BENZOCAINE AND MENTHOL 1 LOZENGE: 5; 1 LIQUID ORAL at 15:17

## 2020-09-01 RX ADMIN — HEPARIN SODIUM 5000 UNIT(S): 5000 INJECTION INTRAVENOUS; SUBCUTANEOUS at 05:17

## 2020-09-01 RX ADMIN — HEPARIN SODIUM 5000 UNIT(S): 5000 INJECTION INTRAVENOUS; SUBCUTANEOUS at 13:17

## 2020-09-01 RX ADMIN — Medication 650 MILLIGRAM(S): at 16:12

## 2020-09-01 RX ADMIN — Medication 600 MILLIGRAM(S): at 17:10

## 2020-09-01 RX ADMIN — Medication 0.1 MILLIGRAM(S): at 17:10

## 2020-09-01 RX ADMIN — Medication 500 MILLIGRAM(S): at 05:18

## 2020-09-01 NOTE — PROGRESS NOTE ADULT - ATTENDING COMMENTS
Agree that pt can go for pulm htn workup as an outpt.  Would be good to see prior PA pressures
DELMA 2/2 presumed post-strep GN, cr continues to rise albeit with slower rate and remains nonoliguric with stable lytes, volume and w/o uremic sx.
DELMA thought to be 2/2 post-strep GN, Cr still increasing but slope of increase is decreasing, non oliguric. BP uncontrolled, team increased amlodipine to 10mg daily
chart reviewed, pt examined  post infectious acute glomerular nephritis.  creat improving  new episode of gout- underlying chronic gout-   reviewed in detail with med team  agree with aspiration, addition of steroids  can resume baseline gout medications as well
above reviewed and discussed at length.   agree with findings and recommendations.

## 2020-09-01 NOTE — CONSULT NOTE ADULT - ASSESSMENT
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  Given site is consistent with his previous flares, will hold off on arthrocentesis and try oral steroids for now, recommend Prednisone 40mg X 5 days.   Patient to follow up with me on friday re: urate lowering therapy.   Will need to clarify "allopurinol allergy"  Likely continue low dose steroids over colchicine for now for anti-inflammatory prophylaxis while on Allopurinol.    D/w Dr. Noe  Call with questions [phone]

## 2020-09-01 NOTE — PROGRESS NOTE ADULT - PROBLEM SELECTOR PLAN 9
F: tolerating PO, no IVF  E: replete K<4, Mg<2  N: Dash/TLC  VTE Prophylaxis: Lovenox 40 Q24H  GI: not needed  C: Full Code  D: likely stay until Tuesday pending renal improvement with slowed increase in sCr F: tolerating PO, no IVF  E: replete K<4, Mg<2  N: Dash/TLC  VTE Prophylaxis: Lovenox 40 Q24H  GI: not needed  C: Full Code  D: plan to DC today pending IA steroid injection

## 2020-09-01 NOTE — PROGRESS NOTE ADULT - PROBLEM SELECTOR PLAN 5
Patient persistently hypertensive with no prior diagnosis of hypertension. Pulmonary hypertension found on TTE and renal insufficiency as potential 2ndary cause of hypertension.  - started on torsemide 20mg qd  - started clonidine 0.1mg q12  - c/w amlodipine 5mg qd   - c/w carvedilol 12.5mg q 12

## 2020-09-01 NOTE — PROGRESS NOTE ADULT - SUBJECTIVE AND OBJECTIVE BOX
Patient is a 45y Male seen and evaluated at bedside. Feels well overall. Edema improving. Wants to go home. New L elbow pain similar to previous gout flares       Meds:    acetaminophen   Tablet .. 650 every 6 hours PRN  acetaminophen   Tablet .. 500 every 6 hours PRN  allopurinol 100 every 12 hours  amLODIPine   Tablet 5 every 24 hours  benzocaine 15 mG/menthol 3.6 mG (Sugar-Free) Lozenge 1 four times a day PRN  carvedilol 12.5 every 12 hours  cloNIDine 0.1 every 12 hours  guaiFENesin  every 12 hours  heparin   Injectable 5000 every 8 hours  melatonin 5 at bedtime  penicillin   every 12 hours  torsemide 20 every 24 hours      T(C): , Max: 37.3 (08-31-20 @ 21:05)  T(F): , Max: 99.2 (09-01-20 @ 13:28)  HR: 77 (09-01-20 @ 13:28)  BP: 157/77 (09-01-20 @ 13:28)  BP(mean): --  RR: 18 (09-01-20 @ 13:28)  SpO2: 94% (09-01-20 @ 13:28)  Wt(kg): --    08-31 @ 07:01  -  09-01 @ 07:00  --------------------------------------------------------  IN: 0 mL / OUT: 600 mL / NET: -600 mL          Review of Systems:  CONSTITUTIONAL: No fever   RESPIRATORY: No shortness of breath, cough  CARDIOVASCULAR: No chest pain or shortness of breath  GASTROINTESTINAL: No abdominal pain, No nausea or vomiting, No diarrhea  GENITOURINARY: No dysuria  MSK: +L elbow pain       PHYSICAL EXAM  Constitutional: WDWN resting comfortably in bed; NAD  Respiratory: CTA B/L  Cardiac: +S1/S2; RRR  Gastrointestinal: soft, NT/ND  Ext: Warm, 1+ edema b/l LEs, L elbow tender   Neurologic: AAOx3; no focal deficits    LABS:                        11.1   8.99  )-----------( 236      ( 01 Sep 2020 06:48 )             32.8     09-01    135  |  104  |  59<H>  ----------------------------<  91  4.6   |  21<L>  |  2.75<H>    Ca    8.6      01 Sep 2020 06:48  Phos  4.5     08-31  Mg     2.4     08-31                  RADIOLOGY & ADDITIONAL STUDIES:

## 2020-09-01 NOTE — PROGRESS NOTE ADULT - PROBLEM SELECTOR PLAN 3
Diagnosed 20 years ago. On allopurinol at home that he takes sporadically. Currently experiencing suspected flare w/ L elbow pain.  - plan methylprednisolone 30mg intra-articular for flare; consult ortho for adminitration  -c/w allopurinol   -c/w meloxicam

## 2020-09-01 NOTE — PROGRESS NOTE ADULT - ASSESSMENT
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     Thank you for the opportunity to participate in the care of your patient. The nephrology service remains available to assist with any questions or concerns. Please feel free to reach us by paging the on-call nephrology fellow for urgent issues or as below.     Leo Jacobson M.D.   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  #gout flare secondary to DELMA and diuretic side effect   low complements and elevated ASO suggestive of recent strep infection, started on PCN, renal function is plateauing with acceptable lytes and trending towards euvolemia   - continue with amlodipine   - continue with Clonidine   - continue with torsemide despite likely role in gout flare, benefit of diuretic in maintaining euvolemia outweighs treatable gout flare side effect   - agree with Ortho consult for L elbow tap and steroid injection; restart anti-gout home meds and follow up o/p     Patient is nephrologically stable for discharge with outpatient follow-up as follows: repeat CMP on Thursday 9/3 and f/u in renal clinic next Tuesday 9/8.     Thank you for the opportunity to participate in the care of your patient. The nephrology service remains available to assist with any questions or concerns. Please feel free to reach us by paging the on-call nephrology fellow for urgent issues or as below.     Leo Jacobson M.D.   PGY-4, Nephrology Fellow   C: [phone]   P: [phone]

## 2020-09-01 NOTE — PROGRESS NOTE ADULT - REASON FOR ADMISSION
chest pain
SOB, palpitations
chest pain

## 2020-09-01 NOTE — PROGRESS NOTE ADULT - SUBJECTIVE AND OBJECTIVE BOX
OVERNIGHT EVENTS: No acute events.    SUBJECTIVE / INTERVAL HPI: Patient seen and examined at bedside.  Patient reports no SOB, chest pain. States his urine has improved in color but that he woke up several times last night to use the restroom. Patient also reports worsening swelling of b/l lower extremities and new onset swelling of the upper extremities. This is accompanied by new onset of left elbow pain that he states is consistent with his previous gout flare ups. All other ROS otherwise negative.    VITAL SIGNS:  Vital Signs Last 24 Hrs  T(C): 37 (01 Sep 2020 05:58), Max: 37.3 (31 Aug 2020 21:05)  T(F): 98.6 (01 Sep 2020 05:58), Max: 99.1 (31 Aug 2020 21:05)  HR: 74 (01 Sep 2020 05:58) (73 - 77)  BP: 153/88 (01 Sep 2020 05:58) (141/75 - 171/97)  BP(mean): --  RR: 17 (01 Sep 2020 05:58) (14 - 17)  SpO2: 95% (01 Sep 2020 05:58) (95% - 95%)    08-30 @ 07:01  -  08-31 @ 07:00  --------------------------------------------------------  IN: 1210 mL / OUT: 1750 mL / NET: -540 mL    08-31 @ 07:01 - 09-01 @ 07:00  --------------------------------------------------------  IN: 0 mL / OUT: 600 mL / NET: -600 mL        PHYSICAL EXAM:  GENERAL: WDWN, resting in bed in mild distress  HEAD:  Atraumatic, Normocephalic  EYES: Conjunctiva and sclera clear  HEART: Regular rate and rhythm; Normal S1, S2; No murmurs, rubs, or gallops  RESPIRATORY: CTA B/L, No W/R/R  ABDOMEN: soft, nontender, nondistended; Bowel sounds present x4  NEUROLOGIC: A&Ox3, nonfocal, moving all extremities  EXTREMITIES:  2+ Peripheral Pulses, 1+ pitting edema b/l UE & LE. Left elbow is warm, TTP. No clubbing, cyanosis.   SKIN: warm, dry, normal color, no rash or abnormal lesions    MEDICATIONS:  MEDICATIONS  (STANDING):  amLODIPine   Tablet 5 milliGRAM(s) Oral every 24 hours  carvedilol 12.5 milliGRAM(s) Oral every 12 hours  cloNIDine 0.1 milliGRAM(s) Oral every 12 hours  guaiFENesin  milliGRAM(s) Oral every 12 hours  heparin   Injectable 5000 Unit(s) SubCutaneous every 8 hours  melatonin 5 milliGRAM(s) Oral at bedtime  penicillin   milliGRAM(s) Oral every 12 hours  torsemide 20 milliGRAM(s) Oral every 24 hours    MEDICATIONS  (PRN):  acetaminophen   Tablet .. 650 milliGRAM(s) Oral every 6 hours PRN Mild Pain (1 - 3)  acetaminophen   Tablet .. 500 milliGRAM(s) Oral every 6 hours PRN Temp greater or equal to 38C (100.4F)  benzocaine 15 mG/menthol 3.6 mG (Sugar-Free) Lozenge 1 Lozenge Oral four times a day PRN Sore Throat      ALLERGIES:  Allergies    allopurinol (Unknown)      LABS:                        11.1   8.99  )-----------( 236      ( 01 Sep 2020 06:48 )             32.8     09-01    135  |  104  |  59<H>  ----------------------------<  91  4.6   |  21<L>  |  2.75<H>    Ca    8.6      01 Sep 2020 06:48  Phos  4.5     08-31  Mg     2.4     08-31    Culture - Blood (08.27.20 @ 13:30)    Specimen Source: .Blood Blood    Culture Results:   No growth at 4 days.    Culture - Blood (08.29.20 @ 22:56)    Specimen Source: .Blood Blood    Culture Results:   No growth at 2 days.    Culture - Urine (08.30.20 @ 00:17)    Specimen Source: .Urine None    Culture Results:   No growth      RADIOLOGY & ADDITIONAL TESTS: Reviewed.

## 2020-09-01 NOTE — CONSULT NOTE ADULT - SUBJECTIVE AND OBJECTIVE BOX
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  Patient diagnosed with gout over 20 years ago. Most of his flares are in his L elbow and he has never had podagra. States he has crystal proven gout from elbow arthrocentesis in the past. At home he takes Allopurinol 100mg daily. States he has taken Colchicine in the past but developed a myopathy from this - biopsy proven. He has rarely taken PO steroids for flares, usually treated with intra-articular injections. He complies with low urate diet also.   Has had one week of elbow pain.   No additional pain at this time.   Denies rashes.  No history of kidney stones.   Over 2 flares a year.     ROS otherwise negative    PMH gout, colchicine induced myopathy   PSH none  FH DM, CAD  SH non smoker, rare ETOH, avoids red meat, shellfish   Meds see med rec  All allopurinol     T 99.1   VSS  Physical exam notable for the following pertinent positives/negatives:  Comfortable   No visible tophi   No podagra  Swelling of L elbow, severe tenderness and pain on movements, warmth   Resp CTAB  CV RRR no mrg     Data reviewed, notable for:  CBC WNL  BUN/Cr 59/2.75   No uric acid from this admission   ESR 40

## 2020-09-01 NOTE — PROGRESS NOTE ADULT - PROBLEM SELECTOR PLAN 2
Patient with no history of kidney disease with elevated creatinine on admission. UA s/f large hematuria and proteinuria. 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  - c/w penicillin V 500 mg PO q12 (day 5/10)  - Renal following will appreciate further recs  - remainder of nephritic work up negative  - trend Cr (9/1 2.75)

## 2020-09-01 NOTE — DISCHARGE NOTE NURSING/CASE MANAGEMENT/SOCIAL WORK - PATIENT PORTAL LINK FT
You can access the FollowMyHealth Patient Portal offered by [facility] by registering at the following website: [email or web address]. By joining [facility]’s FollowMyHealth portal, you will also be able to view your health information using other applications (apps) compatible with our system.

## 2020-09-01 NOTE — CHART NOTE - NSCHARTNOTEFT_GEN_A_CORE
Admitting Diagnosis:   Patient is a 45y old  Male who presents with a chief complaint of chest pain (01 Sep 2020 14:25)    Consult: Yes [   ]  No [ x  ]    Reason for Initial Nutrition Assessment: LOS Nutrition Assessment    PAST MEDICAL & SURGICAL HISTORY:  Gout  No significant past surgical history    Current Nutrition Order: DASH/ TLC diet    PO Intake: Good (%) [ x  ]  Fair (50-75%) [   ] Poor (<25%) [   ]    GI Issues:   WDL, last BM 8/31  No n/v/d/c noted    Pain:  No pain noted at this time    Skin Integrity:  WDL, capo score 23  +1/ trace pitting ed3ema BL feet.   No pressure ulcers noted    Labs:   09-01    135  |  104  |  59<H>  ----------------------------<  91  4.6   |  21<L>  |  2.75<H>    Ca    8.6      01 Sep 2020 06:48  Phos  4.5     08-31  Mg     2.4     08-31    Medications:  MEDICATIONS  (STANDING):  amLODIPine   Tablet 5 milliGRAM(s) Oral every 24 hours  carvedilol 12.5 milliGRAM(s) Oral every 12 hours  cloNIDine 0.1 milliGRAM(s) Oral every 12 hours  guaiFENesin  milliGRAM(s) Oral every 12 hours  heparin   Injectable 5000 Unit(s) SubCutaneous every 8 hours  melatonin 5 milliGRAM(s) Oral at bedtime  penicillin   milliGRAM(s) Oral every 12 hours  predniSONE   Tablet 40 milliGRAM(s) Oral every 24 hours  torsemide 20 milliGRAM(s) Oral every 24 hours    MEDICATIONS  (PRN):  acetaminophen   Tablet .. 650 milliGRAM(s) Oral every 6 hours PRN Mild Pain (1 - 3)  acetaminophen   Tablet .. 500 milliGRAM(s) Oral every 6 hours PRN Temp greater or equal to 38C (100.4F)  benzocaine 15 mG/menthol 3.6 mG (Sugar-Free) Lozenge 1 Lozenge Oral four times a day PRN Sore Throat    Admitted Anthropometrics:  Height: 74" Weight: 260lbs, IBW 190lbs+/-10%, %%, BMI 33.4 kg/m2    Weight:  UBW 260lbs  8/27 260lbs    Weight Change: UBW consistent with admission weight. Please cont to trend weights biweekly    Nutrition Focused Physical Exam: Completed [   ]  Unable to complete [   ]- not pertinent    Estimated energy needs:   IBW (86kg) used for calculations as pt >120% of IBW. Needs estimated for general healthy adult.   Kcal (25-30 kcal/kg): 9692-7432 kcal  Protein (1.0-1.2 g/kg pro):  g pro  Fluids (25-30 ml/kg): 7457-5594 ml    Subjective:   45 year old male with PMHx of gout who presented with SOB and intermittent palpitations x 2 days found to have pulmonary hypertension and renal insufficiency likely glomerulonephritis 2/2 strep UR infection. SOB now resolved but new swelling in BL lower extremities and gout flare up of left elbow noted. Plan for ortho to follow up for gout recommendations and then d/c. On assessment, pt is resting in bed without complaints. Currently on DASH/ TLC diet tolerating PO well. Pt consuming >75% of meals. Cont to encourage adequate PO intake. Discussed current diet in regards to new swelling. Pt noting UBW is consistent with admission weight. Appetite was good PTA. NKFA. Please see nutrition recs below. RD to follow.     Nutrition Diagnosis: Decreased Na needs RT altered nutrient utilization AEB new onset swelling of BL lower extremities    [  ] No active nutrition diagnosis at this time  [  ] Current medical condition precludes nutrition intervention    Goal: Pt to remain compliant with dietary recommendations during this admission.     Recommendations:  1. Cont with DASH/ TLC diet  >> Once swelling improved, recommend liberalize to regular diet  2. Cont to encourage adequate PO intake  3. Pain and bowel regimes per team  4. Cont to monitor lytes and replete prn    Education: Discussed current diet in regards to new swelling    Risk Level: High [   ] Moderate [ x  ] Low [   ]

## 2020-09-01 NOTE — PROGRESS NOTE ADULT - PROBLEM SELECTOR PLAN 1
Patient with new onset intermittent palpitations x2 days with episodes lasting >1 hour. Ddx includes MARY, anxiety induced, thyroid dysfunction. More likely MARY or anxiety induced due to increased stress from father's recent passing.  - reporting resolution as of 8/31 AM  - f/u TSH 1.465 wnl  - rec outpatient f/u with cardio and consider Holter monitor    #Syphilis  - Positive RPR in setting of reported diagnosis and treatment 12 years ago  - No further recs at this time

## 2020-09-01 NOTE — PROGRESS NOTE ADULT - ASSESSMENT
45 year old male with PMHx of gout who presented with SOB and intermittent palpitations x 2 days found to have pulmonary hypertension and renal insufficiency likely glomerulonephritis 2/2 strep URinfection.

## 2020-09-01 NOTE — PROGRESS NOTE ADULT - SUBJECTIVE AND OBJECTIVE BOX
Patient was seen and examined by me at bedside. I agree with resident's note, subjective, objective physical exam, assessment and plan with following modifications/additions.    On exam: VSS-afeb, Aox3, lungs clear, CV RRR, abd soft, nt ext trace LE edema b/l, swelling warmth L elbow     45 year old male with PMHx of gout who presented with SOB and intermittent palpitations x 2 days found to have post strep GN c/b HTN and mild overload  -Post strep GN: continue penicillin, creat plateaued, safe for dc home per renal with close f/u this week     -HTN, overload: mild LE edema b/l from amlodipine- continue 5mg amlodipine, continue torsemide today, continue coreg, continue with clonidine patch   -Gout flare likely from DELMA and diuretics- local steroid injection by ortho in L elbow preferred by pt and best option with CKD  -Dispo- home today pending ortho gout management, outpt f/u with renal this week, also provide info for sleep study followup    Greater than 35 minutes spent on total encounter; more than 50% of the visit was spent counseling and/or coordinating care by the attending physician.  John Noe MD [phone] Patient was seen and examined by me at bedside. I agree with resident's note, subjective, objective physical exam, assessment and plan with following modifications/additions.    On exam: VSS-afeb, Aox3, lungs clear, CV RRR, abd soft, nt ext trace LE edema b/l, swelling warmth L elbow     45 year old male with PMHx of gout who presented with SOB and intermittent palpitations x 2 days found to have post strep GN c/b HTN and mild overload  -Post strep GN: continue penicillin, creat plateaued, safe for dc home per renal with close f/u this week     -HTN, overload: mild LE edema b/l from amlodipine- continue 5mg amlodipine, continue torsemide today, continue coreg, continue with clonidine patch   -Gout flare likely from DELMA and diuretics- local steroid injection by ortho in L elbow preferred by pt and best option with CKD  Syphilis hx, RPR positive- pt confirmed hx of syphilis adequately treated, will f/u outpt re further workup  -Dispo- home today pending ortho gout management, outpt f/u with renal this week, also provide info for sleep study followup    Greater than 35 minutes spent on total encounter; more than 50% of the visit was spent counseling and/or coordinating care by the attending physician.  John Noe MD [phone]

## 2020-09-02 NOTE — CHART NOTE - NSCHARTNOTEFT_GEN_A_CORE
Patient called me with concern re renal appointment- I clarified with renal team and pt confirmed has appt with nephrology team tomorrow and rhuematology on Fri. Grateful for the followup and will ensure he takes his medications and follows up with outpatient care Patient called me with concern re renal appointment- I clarified with renal team and pt confirmed has appt with nephrology team tomorrow re GN and rheumatology on Fri for gout. Still endorsing mild LE edema- I assured him renal teach and likely take him off amlodipine and try different BP med to help with that. Re L elbow pain, still present on second dose of steroid today, should improve and rheum can tap if persistent pain. Grateful for the followup and will ensure he takes his medications and follows up with outpatient care

## 2020-09-03 ENCOUNTER — LABORATORY RESULT (OUTPATIENT)
Age: 45
End: 2020-09-03

## 2020-09-03 ENCOUNTER — APPOINTMENT (OUTPATIENT)
Dept: NEPHROLOGY | Facility: CLINIC | Age: 45
End: 2020-09-03
Payer: MEDICAID

## 2020-09-03 VITALS — SYSTOLIC BLOOD PRESSURE: 160 MMHG | HEART RATE: 78 BPM | DIASTOLIC BLOOD PRESSURE: 80 MMHG

## 2020-09-03 VITALS — TEMPERATURE: 98.3 F | WEIGHT: 289 LBS | HEART RATE: 81 BPM | OXYGEN SATURATION: 93 %

## 2020-09-03 VITALS — HEART RATE: 78 BPM | DIASTOLIC BLOOD PRESSURE: 80 MMHG | SYSTOLIC BLOOD PRESSURE: 146 MMHG

## 2020-09-03 VITALS — DIASTOLIC BLOOD PRESSURE: 80 MMHG | SYSTOLIC BLOOD PRESSURE: 140 MMHG

## 2020-09-03 VITALS — DIASTOLIC BLOOD PRESSURE: 80 MMHG | SYSTOLIC BLOOD PRESSURE: 148 MMHG | HEART RATE: 72 BPM

## 2020-09-03 LAB
CULTURE RESULTS: SIGNIFICANT CHANGE UP
CULTURE RESULTS: SIGNIFICANT CHANGE UP
SPECIMEN SOURCE: SIGNIFICANT CHANGE UP
SPECIMEN SOURCE: SIGNIFICANT CHANGE UP

## 2020-09-03 PROCEDURE — 99496 TRANSJ CARE MGMT HIGH F2F 7D: CPT | Mod: 25

## 2020-09-03 PROCEDURE — 36415 COLL VENOUS BLD VENIPUNCTURE: CPT

## 2020-09-03 NOTE — ADDENDUM
[FreeTextEntry1] :  Documented by Gilberto Ferguson acting as a scribe for Dr. Devon Gutierrez on 09/03/2020.

## 2020-09-03 NOTE — HISTORY OF PRESENT ILLNESS
[Primary] : primary hypertension [Hospitalization] : ~He/She~ was hospitalized [de-identified] : gout [FreeTextEntry1] : Patient is a 45 year old male with PMHx of gout who was admitted to [facility] on 8/27/20 with SOB and intermittent palpitations x 2 days,  found to have pulmonary hypertension and renal insufficiency. This was associated with HTN, proteinuria, and microhematuria, and follows a history two week earlier of an acute severe sore throat. Possible post infection acute GN was suspected, and his labs displayed an elevated ASLO titer with low complements, so possible renal biopsy was deferred in favor of a conservative trial of supportive treatment and BP control for his acute GN. Patient was started on penicillin, diuretics, and other antihypertensives. Patient developed cough with low fever that resolved with acetaminophen and hydrocodone/homatropine syrup. Patient then developed an acute gout flare affecting the left elbow following initiation of torsemide, which was treated with prednisone. On 9/1/20 pt was stable for discharge and outpatient management. \par \par Labs on 9/1/20: WBC 8.99, HGB 11.1, HCT 32.8, Na 135, K 4.6, Cl 104, CO2 of 21, BUN 59, creatinine 2.75, eGFR 30, heavy proteinuria on UA, cultures all negative.\par EKG without significant abnormalities in hospital on 8/31/20.\par \par Patient is currently feeling better than in the hospital but not back to baseline. He is still having some VERONICA, not back to his baseline. He reports that in the hospital he weighed approximately 270lbs, baseline is 265lbs. Today in the office he weighs 289lbs, a gain of 24lbs. He notes that his appetite is not back to normal and he has reduced PO intake. He is still having some joint pain but reduced and tolerable such that he chose not to take today's dose of steroids. He has an appointment with rheumatologist Dr. Stevens tomorrow. Pt did not receive meloxicam and so he has not taken any; advised patient to not begin NSAID treatment for pain management. Patient is taking prednisone started on 9/1 in the hospital per Dr. Stevens for only three days for pain management until he sees her tomorrow. Patient notes that he has been out of work for approximately one year due to a radiculopathy for which he is followed by Dr. Bryson, and has been treated with ablation, and an epidural which he did not tolerate well.\par \par Current medications: torsemide 20mg QD, amlodipine 5mg QD, Carvedilol 12.5mg Q12H, clonidine 0.1mg Q12H, prednisone 20mg QD, penicillin VK 500mg PO q12H for a 10 day course.

## 2020-09-03 NOTE — PHYSICAL EXAM
[General Appearance - Alert] : alert [Sclera] : the sclera and conjunctiva were normal [General Appearance - In No Acute Distress] : in no acute distress [PERRL With Normal Accommodation] : pupils were equal in size, round, and reactive to light [Extraocular Movements] : extraocular movements were intact [Outer Ear] : the ears and nose were normal in appearance [Oropharynx] : the oropharynx was normal [Neck Appearance] : the appearance of the neck was normal [Neck Cervical Mass (___cm)] : no neck mass was observed [Jugular Venous Distention Increased] : there was no jugular-venous distention [Thyroid Diffuse Enlargement] : the thyroid was not enlarged [Thyroid Nodule] : there were no palpable thyroid nodules [Auscultation Breath Sounds / Voice Sounds] : lungs were clear to auscultation bilaterally [Heart Rate And Rhythm] : heart rate was normal and rhythm regular [Heart Sounds] : normal S1 and S2 [Heart Sounds Gallop] : no gallops [Murmurs] : no murmurs [Heart Sounds Pericardial Friction Rub] : no pericardial rub [No CVA Tenderness] : no ~M costovertebral angle tenderness [No Spinal Tenderness] : no spinal tenderness [Abnormal Walk] : normal gait [Nail Clubbing] : no clubbing  or cyanosis of the fingernails [Motor Tone] : muscle strength and tone were normal [Musculoskeletal - Swelling] : no joint swelling seen [Skin Color & Pigmentation] : normal skin color and pigmentation [Skin Turgor] : normal skin turgor [No Focal Deficits] : no focal deficits [Oriented To Time, Place, And Person] : oriented to person, place, and time [Impaired Insight] : insight and judgment were intact [Affect] : the affect was normal [Abdomen Soft] : soft [Bowel Sounds] : normal bowel sounds [Abdomen Tenderness] : non-tender [] : no hepato-splenomegaly [Abdomen Mass (___ Cm)] : no abdominal mass palpated [FreeTextEntry1] : trace to 1+ bilateral LE edema

## 2020-09-03 NOTE — END OF VISIT
[>50% of the face to face encounter time was spent on counseling and/or coordination of care for ___] : Greater than 50% of the face to face encounter time was spent on counseling and/or coordination of care for [unfilled] [Time Spent: ___ minutes] : I have spent [unfilled] minutes of time on the encounter. [FreeTextEntry3] : All medical record entries made by the Scribe were at my, Dr. Devon Gutierrez, direction and personally dictated by me on 09/03/2020. I have reviewed the chart and agree that the record accurately reflects my personal performance of the history, physical exam, assessment and plan. I have also personally directed, reviewed, and agreed with the chart.

## 2020-09-03 NOTE — ASSESSMENT
[FreeTextEntry1] : Plan:\par 1) HTN: Patient's BP in office is hypertensive in office today. Will no increase torsemide from 20mg QD to 40mg QD, amlodipine from 5mg QD to 5mg BID, and clonidine from 0.1mg Q12H to 0.2mg Q12H. We will reassess his pressure in the office in one week.\par 2) Fluid volume overload: Patient on exam with trace to 1+ bilateral LE edema and has gained approximately 24lbs since the onset of his recent illness. Will increase torsemide as above and well as amlodipine and clonidine. \par 3) Post-streptococcal glomerulonephritis: Patient discharged from [facility] on 9/1/20 with creatinine of 2.75, eGFR 30. Advised patient to not take meloxicam as NSAID therapy will exacerbate his renal insufficiency. Change in medications as above, and will reassess renal function on CMP.\par 4) Gout: Patient will hold prednisone until he sees Dr. Stevens tomorrow.\par 5) Antibiotics regimen: Patient will continue on his course of penicillin until completion.\par \par Changes to Medications: increase torsemide from 20mg QD to 40mg QD, amlodipine from 5mg QD to 5mg BID, and clonidine from 0.1mg Q12H to 0.2mg Q12H, do not take meloxicam, hold prednisone.\par \par Labs were drawn and patient will return in one week for a follow-up appointment.

## 2020-09-04 ENCOUNTER — APPOINTMENT (OUTPATIENT)
Dept: RHEUMATOLOGY | Facility: CLINIC | Age: 45
End: 2020-09-04
Payer: MEDICAID

## 2020-09-04 VITALS
TEMPERATURE: 99 F | SYSTOLIC BLOOD PRESSURE: 175 MMHG | DIASTOLIC BLOOD PRESSURE: 93 MMHG | WEIGHT: 275 LBS | BODY MASS INDEX: 35.29 KG/M2 | HEIGHT: 74 IN | OXYGEN SATURATION: 96 % | HEART RATE: 90 BPM

## 2020-09-04 PROCEDURE — 99205 OFFICE O/P NEW HI 60 MIN: CPT

## 2020-09-04 RX ORDER — PREDNISONE 20 MG/1
20 TABLET ORAL DAILY
Qty: 30 | Refills: 2 | Status: ACTIVE | COMMUNITY
Start: 2020-09-04 | End: 1900-01-01

## 2020-09-07 LAB
24R-OH-CALCIDIOL SERPL-MCNC: 57.6 PG/ML
25(OH)D3 SERPL-MCNC: 27.2 NG/ML
ALBUMIN MFR SERPL ELPH: 37.4 %
ALBUMIN MFR SERPL ELPH: 40.1 %
ALBUMIN SERPL ELPH-MCNC: 3.5 G/DL
ALBUMIN SERPL-MCNC: 3.3 G/DL
ALBUMIN SERPL-MCNC: 3.5 G/DL
ALBUMIN/GLOB SERPL: 0.6 RATIO
ALBUMIN/GLOB SERPL: 0.7 RATIO
ALDOSTERONE SERUM: 3.8 NG/DL
ALP BLD-CCNC: 66 U/L
ALP BONE SERPL-MCNC: 13 MCG/L
ALPHA1 GLOB MFR SERPL ELPH: 4.1 %
ALPHA1 GLOB MFR SERPL ELPH: 4.8 %
ALPHA1 GLOB SERPL ELPH-MCNC: 0.4 G/DL
ALPHA1 GLOB SERPL ELPH-MCNC: 0.4 G/DL
ALPHA2 GLOB MFR SERPL ELPH: 9.1 %
ALPHA2 GLOB MFR SERPL ELPH: 9.5 %
ALPHA2 GLOB SERPL ELPH-MCNC: 0.8 G/DL
ALPHA2 GLOB SERPL ELPH-MCNC: 0.8 G/DL
ALT SERPL-CCNC: 32 U/L
ANA PAT FLD IF-IMP: ABNORMAL
ANA PATTERN: ABNORMAL
ANA SER IF-ACNC: ABNORMAL
ANA TITER: ABNORMAL
ANION GAP SERPL CALC-SCNC: 19 MMOL/L
APPEARANCE: CLEAR
APTT BLD: 32 SEC
ASO AB SER LA-ACNC: 1172 IU/ML
AST SERPL-CCNC: 37 U/L
B-GLOBULIN MFR SERPL ELPH: 15.8 %
B-GLOBULIN MFR SERPL ELPH: 8.5 %
B-GLOBULIN SERPL ELPH-MCNC: 0.7 G/DL
B-GLOBULIN SERPL ELPH-MCNC: 1.4 G/DL
BACTERIA: NEGATIVE
BASOPHILS # BLD AUTO: 0.04 K/UL
BASOPHILS NFR BLD AUTO: 0.3 %
BILIRUB SERPL-MCNC: 0.6 MG/DL
BILIRUBIN URINE: NEGATIVE
BLOOD URINE: ABNORMAL
BUN SERPL-MCNC: 48 MG/DL
C3 SERPL-MCNC: 38 MG/DL
C4 SERPL-MCNC: 12 MG/DL
CALCIUM SERPL-MCNC: 9.2 MG/DL
CALCIUM SERPL-MCNC: 9.2 MG/DL
CH50 SERPL-MCNC: 37 U/ML
CHLORIDE SERPL-SCNC: 103 MMOL/L
CHOLEST SERPL-MCNC: 175 MG/DL
CHOLEST/HDLC SERPL: 6 RATIO
CO2 SERPL-SCNC: 20 MMOL/L
COLOR: COLORLESS
CREAT SERPL-MCNC: 2.07 MG/DL
CREAT SPEC-SCNC: 44 MG/DL
CREAT/PROT UR: 1.6 RATIO
CRP SERPL-MCNC: 1.17 MG/DL
DEPRECATED D DIMER PPP IA-ACNC: 682 NG/ML DDU
DEPRECATED KAPPA LC FREE/LAMBDA SER: 1.68 RATIO
DEPRECATED KAPPA LC FREE/LAMBDA SER: 1.68 RATIO
DEPRECATED KAPPA LC FREE/LAMBDA SER: 1.72 RATIO
ENA RNP AB SER IA-ACNC: <0.2 AL
ENA SM AB SER IA-ACNC: 0.3 AL
EOSINOPHIL # BLD AUTO: 0.01 K/UL
EOSINOPHIL NFR BLD AUTO: 0.1 %
ERYTHROCYTE [SEDIMENTATION RATE] IN BLOOD BY WESTERGREN METHOD: 120 MM/HR
ESTIMATED AVERAGE GLUCOSE: 117 MG/DL
FERRITIN SERPL-MCNC: 354 NG/ML
FOLATE SERPL-MCNC: 12.6 NG/ML
GAMMA GLOB FLD ELPH-MCNC: 2.9 G/DL
GAMMA GLOB FLD ELPH-MCNC: 3.3 G/DL
GAMMA GLOB MFR SERPL ELPH: 32.9 %
GAMMA GLOB MFR SERPL ELPH: 37.8 %
GLUCOSE QUALITATIVE U: NEGATIVE
GLUCOSE SERPL-MCNC: NORMAL MG/DL
HBA1C MFR BLD HPLC: 5.7 %
HBV SURFACE AB SER QL: NONREACTIVE
HBV SURFACE AG SER QL: NONREACTIVE
HCT VFR BLD CALC: 36.6 %
HCV AB SER QL: NONREACTIVE
HCV S/CO RATIO: 0.24 S/CO
HCYS SERPL-MCNC: 22.3 UMOL/L
HDLC SERPL-MCNC: 29 MG/DL
HGB BLD-MCNC: 11.5 G/DL
HYALINE CASTS: 3 /LPF
IGA SER QL IEP: 216 MG/DL
IGA SER QL IEP: 219 MG/DL
IGG SER QL IEP: 3180 MG/DL
IGG SER QL IEP: 3205 MG/DL
IGM SER QL IEP: 75 MG/DL
IGM SER QL IEP: 78 MG/DL
IMM GRANULOCYTES NFR BLD AUTO: 0.2 %
INR PPP: 1.04 RATIO
INTERPRETATION SERPL IEP-IMP: NORMAL
INTERPRETATION SERPL IEP-IMP: NORMAL
IRON SATN MFR SERPL: 43 %
IRON SERPL-MCNC: 102 UG/DL
KAPPA LC CSF-MCNC: 8.07 MG/DL
KAPPA LC CSF-MCNC: 8.56 MG/DL
KAPPA LC CSF-MCNC: 8.56 MG/DL
KAPPA LC SERPL-MCNC: 13.92 MG/DL
KAPPA LC SERPL-MCNC: 14.41 MG/DL
KAPPA LC SERPL-MCNC: 14.41 MG/DL
KETONES URINE: NEGATIVE
LDLC SERPL CALC-MCNC: 116 MG/DL
LEUKOCYTE ESTERASE URINE: NEGATIVE
LYMPHOCYTES # BLD AUTO: 2.54 K/UL
LYMPHOCYTES NFR BLD AUTO: 19.2 %
M PROTEIN MFR SERPL ELPH: NORMAL
M PROTEIN MFR SERPL ELPH: NORMAL
M PROTEIN SPEC IFE-MCNC: NORMAL
M PROTEIN SPEC IFE-MCNC: NORMAL
MAGNESIUM SERPL-MCNC: 2.4 MG/DL
MAN DIFF?: NORMAL
MCHC RBC-ENTMCNC: 29.3 PG
MCHC RBC-ENTMCNC: 31.4 GM/DL
MCV RBC AUTO: 93.1 FL
MICROSCOPIC-UA: NORMAL
MONOCLON BAND OBS SERPL: NORMAL
MONOCLON BAND OBS SERPL: NORMAL
MONOCYTES # BLD AUTO: 1.34 K/UL
MONOCYTES NFR BLD AUTO: 10.1 %
NEUTROPHILS # BLD AUTO: 9.26 K/UL
NEUTROPHILS NFR BLD AUTO: 70.1 %
NITRITE URINE: NEGATIVE
PARATHYROID HORMONE INTACT: 51 PG/ML
PH URINE: 5.5
PHOSPHATE SERPL-MCNC: 3.5 MG/DL
PLATELET # BLD AUTO: 275 K/UL
POTASSIUM SERPL-SCNC: 4.1 MMOL/L
PROT SERPL-MCNC: 8.8 G/DL
PROT UR-MCNC: 70 MG/DL
PROTEIN URINE: ABNORMAL
PT BLD: 12.4 SEC
RBC # BLD: 3.93 M/UL
RBC # FLD: 13.4 %
RED BLOOD CELLS URINE: 2 /HPF
RENIN PLASMA: 6.9 PG/ML
RHEUMATOID FACT SER QL: <10 IU/ML
SARS-COV-2 IGG SERPL IA-ACNC: <0.1 INDEX
SARS-COV-2 IGG SERPL QL IA: NEGATIVE
SODIUM SERPL-SCNC: 141 MMOL/L
SPECIFIC GRAVITY URINE: 1.01
SQUAMOUS EPITHELIAL CELLS: 0 /HPF
T3FREE SERPL-MCNC: 3.03 PG/ML
T3RU NFR SERPL: 0.9 TBI
T4 FREE SERPL-MCNC: 1.5 NG/DL
T4 SERPL-MCNC: 5.9 UG/DL
THYROGLOB AB SERPL-ACNC: <20 IU/ML
THYROPEROXIDASE AB SERPL IA-ACNC: <10 IU/ML
TIBC SERPL-MCNC: 235 UG/DL
TRIGL SERPL-MCNC: 151 MG/DL
TSH SERPL-ACNC: 1.9 UIU/ML
UIBC SERPL-MCNC: 133 UG/DL
URATE SERPL-MCNC: 14.7 MG/DL
UROBILINOGEN URINE: NORMAL
VIT B12 SERPL-MCNC: 1859 PG/ML
WBC # FLD AUTO: 13.22 K/UL
WHITE BLOOD CELLS URINE: 2 /HPF

## 2020-09-08 DIAGNOSIS — N05.9 UNSPECIFIED NEPHRITIC SYNDROME WITH UNSPECIFIED MORPHOLOGIC CHANGES: ICD-10-CM

## 2020-09-08 DIAGNOSIS — I27.20 PULMONARY HYPERTENSION, UNSPECIFIED: ICD-10-CM

## 2020-09-08 DIAGNOSIS — M10.9 GOUT, UNSPECIFIED: ICD-10-CM

## 2020-09-08 DIAGNOSIS — R07.9 CHEST PAIN, UNSPECIFIED: ICD-10-CM

## 2020-09-08 DIAGNOSIS — R80.9 PROTEINURIA, UNSPECIFIED: ICD-10-CM

## 2020-09-08 DIAGNOSIS — R31.9 HEMATURIA, UNSPECIFIED: ICD-10-CM

## 2020-09-08 DIAGNOSIS — D64.9 ANEMIA, UNSPECIFIED: ICD-10-CM

## 2020-09-08 DIAGNOSIS — R00.2 PALPITATIONS: ICD-10-CM

## 2020-09-08 DIAGNOSIS — N17.9 ACUTE KIDNEY FAILURE, UNSPECIFIED: ICD-10-CM

## 2020-09-08 DIAGNOSIS — I10 ESSENTIAL (PRIMARY) HYPERTENSION: ICD-10-CM

## 2020-09-08 DIAGNOSIS — G47.33 OBSTRUCTIVE SLEEP APNEA (ADULT) (PEDIATRIC): ICD-10-CM

## 2020-09-08 DIAGNOSIS — R06.02 SHORTNESS OF BREATH: ICD-10-CM

## 2020-09-08 DIAGNOSIS — I24.8 OTHER FORMS OF ACUTE ISCHEMIC HEART DISEASE: ICD-10-CM

## 2020-09-08 NOTE — HISTORY OF PRESENT ILLNESS
[Anorexia] : no anorexia [FreeTextEntry1] : 45 year old male with gout - recent flare while admitted at [facility]\par Admitted for post streptococcal GN with DELMA. \par S/p diuresis, developed a gout flare in his L elbow. Also had a low fever at this time. \par Flare consistent with previous flares, was phoned after 1 week of flare, so decided to give Prednisone. No Colchicine given history of myopathy secondary to this (biopsied 2015)\par Patient discharged on Prednisone 40mg daily - stopped this yesterday after taking it for 2 days - does not mild improvement on Prednisone but states this elbow feels different from prior flares, with increased restriction of ROM. \par Persistent stiffness/swelling in elbow\par Denies any other joint pain/involvement\par No rashes\par Afebrile [Weight Loss] : no weight loss [Malaise] : no malaise [Fever] : no fever [Chills] : no chills [Fatigue] : no fatigue [Malar Facial Rash] : no malar facial rash [Skin Lesions] : no lesions [Depression] : depression [Skin Nodules] : no skin nodules [Oral Ulcers] : no oral ulcers [Cough] : no cough [Shortness of Breath] : no shortness of breath [Dry Mouth] : no dry mouth [Chest Pain] : no chest pain [Falls] : no falls [Difficulty Standing] : no difficulty standing [Difficulty Walking] : no difficulty walking [Myalgias] : no myalgias [Eye Redness] : no eye redness [Eye Pain] : no eye pain [Dry Eyes] : no dry eyes

## 2020-09-08 NOTE — PHYSICAL EXAM
[General Appearance - Alert] : alert [General Appearance - In No Acute Distress] : in no acute distress [General Appearance - Well Developed] : well developed [General Appearance - Well Nourished] : well nourished [Sclera] : the sclera and conjunctiva were normal [General Appearance - Well-Appearing] : healthy appearing [Examination Of The Oral Cavity] : the lips and gums were normal [Outer Ear] : the ears and nose were normal in appearance [Neck Appearance] : the appearance of the neck was normal [Nasal Cavity] : the nasal mucosa and septum were normal [Auscultation Breath Sounds / Voice Sounds] : lungs were clear to auscultation bilaterally [Respiration, Rhythm And Depth] : normal respiratory rhythm and effort [Heart Rate And Rhythm] : heart rate was normal and rhythm regular [Heart Sounds] : normal S1 and S2 [Edema] : there was no peripheral edema [FreeTextEntry1] : swelling and limited ROM of L elbow, +warmth  [] : no rash [Oriented To Time, Place, And Person] : oriented to person, place, and time

## 2020-09-08 NOTE — ASSESSMENT
[FreeTextEntry1] : 45 year old male with gout - recent flare while admitted at [facility]\par Unable to aspirate fluid from L elbow today - characteristics of this flare are different for the patient. Though previous gout flares were in his L elbow, this pain is more severe, he notes more limitation of function, suboptimal response to steroids. Will refer to ortho - patient to call Dr. Frankel when he leaves the office today. No fluid aspirated from arthrocentesis which makes septic arthritis unlikely.  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 \par Start Allopurinol 100mg diaily. \francesca Spoke with Callum also. \par

## 2020-09-08 NOTE — PROCEDURE
[Today's Date:] : Date: [unfilled] [Arthrocentesis] : arthrocentesis was performed [Patient] : the patient [Risks] : risks [Benefits] : benefits [Diagnostic] : diagnostic  [Alternatives] : alternatives [#1 Site: ______] : #1 site identified in the [unfilled] [___ ml Inj] : [unfilled] ~Uml [1%] : 1%  [Alcohol] : alcohol [Chlorhexidine] : chlorhexidine [22 gauge 1.5 inch] : A 22 gauge 1.5 inch needle was used [de-identified] : no fluid aspirated

## 2020-09-10 ENCOUNTER — APPOINTMENT (OUTPATIENT)
Dept: NEPHROLOGY | Facility: CLINIC | Age: 45
End: 2020-09-10
Payer: MEDICAID

## 2020-09-10 VITALS — DIASTOLIC BLOOD PRESSURE: 80 MMHG | SYSTOLIC BLOOD PRESSURE: 130 MMHG | HEART RATE: 84 BPM

## 2020-09-10 VITALS — BODY MASS INDEX: 32.74 KG/M2 | WEIGHT: 255 LBS | HEART RATE: 67 BPM | OXYGEN SATURATION: 98 % | TEMPERATURE: 99 F

## 2020-09-10 VITALS — SYSTOLIC BLOOD PRESSURE: 122 MMHG | HEART RATE: 79 BPM | DIASTOLIC BLOOD PRESSURE: 80 MMHG

## 2020-09-10 VITALS — SYSTOLIC BLOOD PRESSURE: 128 MMHG | HEART RATE: 84 BPM | DIASTOLIC BLOOD PRESSURE: 76 MMHG

## 2020-09-10 PROCEDURE — 99214 OFFICE O/P EST MOD 30 MIN: CPT | Mod: 25

## 2020-09-10 PROCEDURE — 36415 COLL VENOUS BLD VENIPUNCTURE: CPT

## 2020-09-10 NOTE — ASSESSMENT
[FreeTextEntry1] : Plan:\par 1) HTN: BP acceptable in office today, however his LE are no longer edematous and he reports feeling dizzy and fatigued since starting the additional antihypertensives. Patient to remain on amlodipine 5mg BID, will discontinue clonidine PO, start clonidine #2 patch QW, and he will remain off of torsemide and carvedilol. Advised patient to obtain an Omron cuff and begin monitoring his BP at home. Will reassess in the office at next visit.\par 2) CRF: last eGFR of 37 improved from prior of 30; Have evaluated patient's renal function, blood pressure, and fluid volume status. Changes to medications as above. Will continue to monitor function with CMP due to risk for progression of renal failure. \par 3) Fluid Volume in LE: Patient with no LE edema down from trace to 1+ bilateral LE edema one week ago, and he has lost a considerable amount of weight since he was ill and admitted. Will continue off of torsemide and carvedilol, and will continue to monitor at future visits.\par 4) Gout: Patient to see orthopaedics per Dr. Stevens for his inflammatory flare in his elbow. Will continue on tapering dose of prednisone and allopurinol per Dr. Stevens.\par \par Changes to Medications: discontinued torsemide and carvedilol. Discontinue oral clonidine. Initiate clonidine patch #2. Continue amlodipine 5mg BID.\par \par Labs were drawn and patient will return in one week for a follow-up appointment.

## 2020-09-10 NOTE — END OF VISIT
[Time Spent: ___ minutes] : I have spent [unfilled] minutes of time on the encounter. [>50% of the face to face encounter time was spent on counseling and/or coordination of care for ___] : Greater than 50% of the face to face encounter time was spent on counseling and/or coordination of care for [unfilled] [FreeTextEntry3] : All medical record entries made by the Scribe were at my, Dr. Devon Gutierrez, direction and personally dictated by me on 09/10/2020. I have reviewed the chart and agree that the record accurately reflects my personal performance of the history, physical exam, assessment and plan. I have also personally directed, reviewed, and agreed with the chart.

## 2020-09-10 NOTE — ADDENDUM
[FreeTextEntry1] :  Documented by Gilberto Ferguson acting as a scribe for Dr. Devon Gutierrez on 09/10/2020. \par \par Patient called at 2:30pm after his visit today and has expressed to the provider that he is having difficulty procuring the clonidine patch. As such patient will remain on current dose of clonidine PO 0.2mg QD and remain off of torsemide and carvedilol, and continue amlodipine 5mg BID.

## 2020-09-10 NOTE — HISTORY OF PRESENT ILLNESS
[FreeTextEntry1] : Patient is a 45 year old male with PMHx of gout, HTN, proteinuria, microhematuria and CRI. \par \par Patient does not currently take his BP at home. He notes that he is not tolerating recent medication well, as he feels dizzy and fatigued. Denies chest pain, but notes that he has some dyspnea on exertion that is about the same as before. His LE edema has gone down. Notes he weighed 255lbs yesterday at home, which is down to his baseline weight as he had gained nearly 30lbs during his illness.\par He recently ran out of torsemide and carvedilol a few days ago. Patient finished his course of antibiotics. He began taking prednisone 20mg QD per Dr. Stevens, but has yet to start taking allopurinol as advised.\par \par On 9/4/20 patient saw Dr. Stevens for rheumatology evaluation of gout. She referred patient to ortho, Dr. Frankel. No fluid was aspirated from arthrocentesis in office. Advised to start prednisone 20mg QD for now and will taper to 5mg once flare resolved, to be continued for anti-inflammatory prophylaxis. Also advised to start allopurinol 100mg QD. Note is on record.\par \par Labs from 9/4/20 reveal: WBC 13.22, RBC 3.93, HGB 11.5, HCT 36.6, sed rate 120, HbA1C 5.7, D-Dimer 682, total cholesterol 51, HDL 29, , CO2 of 20, BUN 48, creatinine 2.07, total protein 8.8, eGFR 37, CRP 1.17, uric acid 14.7, Vit D 25 of 27.2, prot/creat ratio 1.6, Kappa FLC 14.41, Lambda FLC 8.56, Kappa Lambda ratio 1.68.\par \par Current medications: amlodipine 5mg BID, clonidine 0.2mg PO Q12H, prednisone 20mg QD. Patient has not yet started allopurinol 100mg QD per Dr. Stevens.\par \par Finished course of torsemide 40mg QD. Patient ran out of Carvedilol 12.5mg Q12H recently. Finished 10 day course of penicillin VK 500mg PO q12H.

## 2020-09-10 NOTE — PHYSICAL EXAM
[General Appearance - Alert] : alert [General Appearance - In No Acute Distress] : in no acute distress [Sclera] : the sclera and conjunctiva were normal [PERRL With Normal Accommodation] : pupils were equal in size, round, and reactive to light [Extraocular Movements] : extraocular movements were intact [Outer Ear] : the ears and nose were normal in appearance [Oropharynx] : the oropharynx was normal [Neck Appearance] : the appearance of the neck was normal [Neck Cervical Mass (___cm)] : no neck mass was observed [Jugular Venous Distention Increased] : there was no jugular-venous distention [Thyroid Diffuse Enlargement] : the thyroid was not enlarged [Thyroid Nodule] : there were no palpable thyroid nodules [Auscultation Breath Sounds / Voice Sounds] : lungs were clear to auscultation bilaterally [Heart Rate And Rhythm] : heart rate was normal and rhythm regular [Heart Sounds] : normal S1 and S2 [Heart Sounds Gallop] : no gallops [Murmurs] : no murmurs [Heart Sounds Pericardial Friction Rub] : no pericardial rub [Abdomen Soft] : soft [Edema] : there was no peripheral edema [Bowel Sounds] : normal bowel sounds [Abdomen Tenderness] : non-tender [Abdomen Mass (___ Cm)] : no abdominal mass palpated [No Spinal Tenderness] : no spinal tenderness [No CVA Tenderness] : no ~M costovertebral angle tenderness [Abnormal Walk] : normal gait [Nail Clubbing] : no clubbing  or cyanosis of the fingernails [Musculoskeletal - Swelling] : no joint swelling seen [Motor Tone] : muscle strength and tone were normal [Skin Color & Pigmentation] : normal skin color and pigmentation [Skin Turgor] : normal skin turgor [] : no rash [No Focal Deficits] : no focal deficits [Oriented To Time, Place, And Person] : oriented to person, place, and time [Impaired Insight] : insight and judgment were intact [Affect] : the affect was normal

## 2020-09-17 ENCOUNTER — APPOINTMENT (OUTPATIENT)
Dept: NEPHROLOGY | Facility: CLINIC | Age: 45
End: 2020-09-17
Payer: MEDICAID

## 2020-09-17 VITALS — HEART RATE: 72 BPM | SYSTOLIC BLOOD PRESSURE: 120 MMHG | DIASTOLIC BLOOD PRESSURE: 70 MMHG

## 2020-09-17 VITALS — DIASTOLIC BLOOD PRESSURE: 70 MMHG | SYSTOLIC BLOOD PRESSURE: 120 MMHG

## 2020-09-17 VITALS — BODY MASS INDEX: 34.54 KG/M2 | HEART RATE: 98 BPM | WEIGHT: 269 LBS | OXYGEN SATURATION: 98 %

## 2020-09-17 PROCEDURE — 99214 OFFICE O/P EST MOD 30 MIN: CPT | Mod: 25

## 2020-09-17 PROCEDURE — 36415 COLL VENOUS BLD VENIPUNCTURE: CPT

## 2020-09-17 RX ORDER — CLONIDINE HYDROCHLORIDE 0.2 MG/1
0.2 TABLET ORAL
Qty: 2 | Refills: 3 | Status: ACTIVE | COMMUNITY
Start: 2020-09-17 | End: 1900-01-01

## 2020-09-17 NOTE — HISTORY OF PRESENT ILLNESS
[FreeTextEntry1] : Patient is a 45 year old male with PMHx of gout, HTN, proteinuria, microhematuria and CRI.\par \par The patient is feeling generally well today and denies any new medical concerns. His breathing is normal, though notes mild difficulty breathing while wearing a mask. He has brought his home BP cuff into the office today for validation; the cuff gave readings of 146/92 at start of visit and 146/88 at middle of visit.\par \par The patient weighs 269 lbs in office today while wearing clothes, a loss of 6 lbs in 2 weeks. He was told he has a bone spur of his left elbow, and last took prednisone yesterday. He is currently being treated for gout with allopurinol and a prednisone taper as directed by Dr. Stevens.\par \par Labs from 9/10/20 reveal: C3 of 54, WBC 12.46, sed rate 120, HbA1C 5.8, D dimer 452, urine protein 30, antistreptolysin O 1202, triglycerides 181, , CO2 19, glucose 67, BUN 67, creatinine 3.04, total protein 9.0, eGFR 27, uric acid 16.9\par \par Current medications: amlodipine 5mg BID, clonidine 0.2mg PO Q12H, Patient on allopurinol 100mg QD per Dr. Stevens, as well as prednisone 20mg QD which will be tapered. He has not yet begun clonidine patch #2 QW due to difficulty in procuring this, and has remained on PO clonidine since last visit.

## 2020-09-17 NOTE — END OF VISIT
[Time Spent: ___ minutes] : I have spent [unfilled] minutes of time on the encounter. [>50% of the face to face encounter time was spent on counseling and/or coordination of care for ___] : Greater than 50% of the face to face encounter time was spent on counseling and/or coordination of care for [unfilled] [FreeTextEntry3] : Documented by Dm Garcia acting as a scribe for Dr. Devon Gutierrez on 09/17/2020.

## 2020-09-17 NOTE — PHYSICAL EXAM
[General Appearance - Alert] : alert [General Appearance - In No Acute Distress] : in no acute distress [Sclera] : the sclera and conjunctiva were normal [PERRL With Normal Accommodation] : pupils were equal in size, round, and reactive to light [Extraocular Movements] : extraocular movements were intact [Outer Ear] : the ears and nose were normal in appearance [Oropharynx] : the oropharynx was normal [Neck Appearance] : the appearance of the neck was normal [Neck Cervical Mass (___cm)] : no neck mass was observed [Jugular Venous Distention Increased] : there was no jugular-venous distention [Thyroid Diffuse Enlargement] : the thyroid was not enlarged [Thyroid Nodule] : there were no palpable thyroid nodules [Auscultation Breath Sounds / Voice Sounds] : lungs were clear to auscultation bilaterally [Heart Rate And Rhythm] : heart rate was normal and rhythm regular [Heart Sounds] : normal S1 and S2 [Heart Sounds Gallop] : no gallops [Murmurs] : no murmurs [Heart Sounds Pericardial Friction Rub] : no pericardial rub [Bowel Sounds] : normal bowel sounds [Abdomen Soft] : soft [Abdomen Tenderness] : non-tender [Abdomen Mass (___ Cm)] : no abdominal mass palpated [No CVA Tenderness] : no ~M costovertebral angle tenderness [No Spinal Tenderness] : no spinal tenderness [Abnormal Walk] : normal gait [Nail Clubbing] : no clubbing  or cyanosis of the fingernails [Musculoskeletal - Swelling] : no joint swelling seen [Motor Tone] : muscle strength and tone were normal [Skin Color & Pigmentation] : normal skin color and pigmentation [Skin Turgor] : normal skin turgor [] : no rash [No Focal Deficits] : no focal deficits [Oriented To Time, Place, And Person] : oriented to person, place, and time [Impaired Insight] : insight and judgment were intact [Affect] : the affect was normal

## 2020-09-17 NOTE — ASSESSMENT
[FreeTextEntry1] : Plan:\par 1) HTN: BP acceptable today on current regimen. Will continue current antihypertensive regimen, including amlodipine 5mg BID and PO clonidine 0.2mg BID. Patient's home BP cuff found to report erroneously elevated readings. Will reassess pressure and regimen at next evaluation. \par 2) CRF: Discussed with patient that last eGFR of 27 and creatinine of 3.04 are believed to be due to level of patient dehydration at last visit, upon review of concurrent labs. Will repeat CMP on labs today.\par 3) Fluid overload: patient determined to be approximately euvolemic on clinical grounds. We will maintain current approach to fluid volume management today but will continue to monitor at future evaluations due to risk of exacerbation of renal failure.\par 4) Gout: Instructed patient to contact Dr. Stevens to obtain explicit instructions on tapering prednisone.\par 5) Inflammatory flare of left elbow: Instructed patient to avoid NSAID use, including ibuprofen and naproxen, in light of adverse effects of BP elevation and potential for renal injury.\par \par Changes to medications: None; c/w PO clonidine 0.2mg BID\par \par Labs were drawn and patient will return in 1 week for a follow-up appointment.

## 2020-09-17 NOTE — ADDENDUM
[FreeTextEntry1] : All medical record entries made by the Scribe were at my, Dr. Devon Gutierrez, direction and personally dictated by me on 09/17/2020. I have reviewed the chart and agree that the record accurately reflects my personal performance of the history, physical exam, assessment and plan. I have also personally directed, reviewed, and agreed with the chart.

## 2020-09-18 LAB
ALBUMIN SERPL ELPH-MCNC: 3.9 G/DL
ALBUMIN SERPL ELPH-MCNC: 4 G/DL
ALP BLD-CCNC: 78 U/L
ALP BLD-CCNC: 78 U/L
ALT SERPL-CCNC: 27 U/L
ALT SERPL-CCNC: 33 U/L
ANION GAP SERPL CALC-SCNC: 15 MMOL/L
ANION GAP SERPL CALC-SCNC: 21 MMOL/L
APPEARANCE: CLEAR
APPEARANCE: CLEAR
ASO AB SER LA-ACNC: 1076 IU/ML
ASO AB SER LA-ACNC: 1202 IU/ML
AST SERPL-CCNC: 26 U/L
AST SERPL-CCNC: 27 U/L
BACTERIA: NEGATIVE
BACTERIA: NEGATIVE
BASOPHILS # BLD AUTO: 0.04 K/UL
BASOPHILS # BLD AUTO: 0.06 K/UL
BASOPHILS NFR BLD AUTO: 0.3 %
BASOPHILS NFR BLD AUTO: 0.4 %
BILIRUB SERPL-MCNC: 0.3 MG/DL
BILIRUB SERPL-MCNC: 0.4 MG/DL
BILIRUBIN URINE: NEGATIVE
BILIRUBIN URINE: NEGATIVE
BLOOD URINE: NEGATIVE
BLOOD URINE: NORMAL
BUN SERPL-MCNC: 40 MG/DL
BUN SERPL-MCNC: 63 MG/DL
C1Q IMMUNE COMPLEX: 2.1 UG EQ/ML
C3 SERPL-MCNC: 54 MG/DL
C3 SERPL-MCNC: 72 MG/DL
C3D IMMUNE COMPLEXES: 15.5 UG EQ/ML
C3D SERPL-MCNC: <12
C4 SERPL-MCNC: 13 MG/DL
C4 SERPL-MCNC: 17 MG/DL
CALCIUM SERPL-MCNC: 9.2 MG/DL
CALCIUM SERPL-MCNC: 9.5 MG/DL
CH50 SERPL-MCNC: 48 U/ML
CHLORIDE SERPL-SCNC: 104 MMOL/L
CHLORIDE SERPL-SCNC: 98 MMOL/L
CHOLEST SERPL-MCNC: 178 MG/DL
CHOLEST SERPL-MCNC: 184 MG/DL
CHOLEST/HDLC SERPL: 3.6 RATIO
CHOLEST/HDLC SERPL: 4.9 RATIO
CO2 SERPL-SCNC: 19 MMOL/L
CO2 SERPL-SCNC: 19 MMOL/L
COLLAGEN CTX SERPL-MCNC: 704 PG/ML
COLOR: NORMAL
COLOR: YELLOW
CREAT SERPL-MCNC: 1.99 MG/DL
CREAT SERPL-MCNC: 3.04 MG/DL
CREAT SPEC-SCNC: 299 MG/DL
CREAT/PROT UR: 0.2 RATIO
CRP SERPL-MCNC: 0.19 MG/DL
CRYOGLOB SERPL-MCNC: NEGATIVE
DEPRECATED D DIMER PPP IA-ACNC: 452 NG/ML DDU
EOSINOPHIL # BLD AUTO: 0.02 K/UL
EOSINOPHIL # BLD AUTO: 0.04 K/UL
EOSINOPHIL NFR BLD AUTO: 0.2 %
EOSINOPHIL NFR BLD AUTO: 0.2 %
ERYTHROCYTE [SEDIMENTATION RATE] IN BLOOD BY WESTERGREN METHOD: 120 MM/HR
ESTIMATED AVERAGE GLUCOSE: 120 MG/DL
GLUCOSE QUALITATIVE U: NEGATIVE
GLUCOSE QUALITATIVE U: NEGATIVE
GLUCOSE SERPL-MCNC: 67 MG/DL
GLUCOSE SERPL-MCNC: 81 MG/DL
GRANULAR CASTS: 5 /LPF
HBA1C MFR BLD HPLC: 5.8 %
HCT VFR BLD CALC: 38.6 %
HCT VFR BLD CALC: 39.9 %
HDLC SERPL-MCNC: 37 MG/DL
HDLC SERPL-MCNC: 49 MG/DL
HGB BLD-MCNC: 13 G/DL
HGB BLD-MCNC: 13 G/DL
HYALINE CASTS: 16 /LPF
HYALINE CASTS: 4 /LPF
IC SERPL QL C1Q BIND: 2.1 MCG EQ/ML
IMM GRANULOCYTES NFR BLD AUTO: 0.4 %
IMM GRANULOCYTES NFR BLD AUTO: 0.5 %
KETONES URINE: NEGATIVE
KETONES URINE: NEGATIVE
LDLC SERPL CALC-MCNC: 111 MG/DL
LDLC SERPL CALC-MCNC: 114 MG/DL
LEUKOCYTE ESTERASE URINE: NEGATIVE
LEUKOCYTE ESTERASE URINE: NEGATIVE
LYMPHOCYTES # BLD AUTO: 2.09 K/UL
LYMPHOCYTES # BLD AUTO: 3.42 K/UL
LYMPHOCYTES NFR BLD AUTO: 16.8 %
LYMPHOCYTES NFR BLD AUTO: 20.5 %
MAGNESIUM SERPL-MCNC: 2.3 MG/DL
MAGNESIUM SERPL-MCNC: 2.3 MG/DL
MAN DIFF?: NORMAL
MAN DIFF?: NORMAL
MCHC RBC-ENTMCNC: 29.3 PG
MCHC RBC-ENTMCNC: 29.7 PG
MCHC RBC-ENTMCNC: 32.6 GM/DL
MCHC RBC-ENTMCNC: 33.7 GM/DL
MCV RBC AUTO: 87.1 FL
MCV RBC AUTO: 91.1 FL
METHYLMALONATE SERPL-SCNC: 197 NMOL/L
MICROSCOPIC-UA: NORMAL
MICROSCOPIC-UA: NORMAL
MONOCYTES # BLD AUTO: 1.13 K/UL
MONOCYTES # BLD AUTO: 1.45 K/UL
MONOCYTES NFR BLD AUTO: 8.7 %
MONOCYTES NFR BLD AUTO: 9.1 %
MPO AB + PR3 PNL SER: NORMAL
NEUTROPHILS # BLD AUTO: 11.64 K/UL
NEUTROPHILS # BLD AUTO: 9.13 K/UL
NEUTROPHILS NFR BLD AUTO: 69.7 %
NEUTROPHILS NFR BLD AUTO: 73.2 %
NITRITE URINE: NEGATIVE
NITRITE URINE: NEGATIVE
PH URINE: 5
PH URINE: 5
PHOSPHATE SERPL-MCNC: 3.6 MG/DL
PHOSPHATE SERPL-MCNC: 4.1 MG/DL
PLATELET # BLD AUTO: 318 K/UL
PLATELET # BLD AUTO: 392 K/UL
POTASSIUM SERPL-SCNC: 4.3 MMOL/L
POTASSIUM SERPL-SCNC: 5.1 MMOL/L
PROT SERPL-MCNC: 8 G/DL
PROT SERPL-MCNC: 9 G/DL
PROT UR-MCNC: 48 MG/DL
PROTEIN URINE: ABNORMAL
PROTEIN URINE: NORMAL
RBC # BLD: 4.38 M/UL
RBC # BLD: 4.43 M/UL
RBC # FLD: 12.4 %
RBC # FLD: 12.8 %
RED BLOOD CELLS URINE: 1 /HPF
RED BLOOD CELLS URINE: 3 /HPF
SODIUM SERPL-SCNC: 138 MMOL/L
SODIUM SERPL-SCNC: 139 MMOL/L
SPECIFIC GRAVITY URINE: 1.01
SPECIFIC GRAVITY URINE: 1.01
SQUAMOUS EPITHELIAL CELLS: 0 /HPF
SQUAMOUS EPITHELIAL CELLS: 1 /HPF
T4 SERPL-MCNC: 5.9 UG/DL
TRIGL SERPL-MCNC: 181 MG/DL
TRIGL SERPL-MCNC: 78 MG/DL
TSH SERPL-ACNC: 1.9 UIU/ML
URATE SERPL-MCNC: 10.5 MG/DL
URATE SERPL-MCNC: 16.9 MG/DL
UROBILINOGEN URINE: NORMAL
UROBILINOGEN URINE: NORMAL
WBC # FLD AUTO: 12.46 K/UL
WBC # FLD AUTO: 16.69 K/UL
WHITE BLOOD CELLS URINE: 1 /HPF
WHITE BLOOD CELLS URINE: 3 /HPF

## 2020-09-24 ENCOUNTER — APPOINTMENT (OUTPATIENT)
Dept: NEPHROLOGY | Facility: CLINIC | Age: 45
End: 2020-09-24
Payer: MEDICAID

## 2020-09-24 VITALS — SYSTOLIC BLOOD PRESSURE: 127 MMHG | DIASTOLIC BLOOD PRESSURE: 82 MMHG | HEART RATE: 76 BPM

## 2020-09-24 VITALS — HEART RATE: 92 BPM | WEIGHT: 255 LBS | OXYGEN SATURATION: 99 % | BODY MASS INDEX: 32.74 KG/M2

## 2020-09-24 VITALS — SYSTOLIC BLOOD PRESSURE: 135 MMHG | HEART RATE: 79 BPM | DIASTOLIC BLOOD PRESSURE: 87 MMHG

## 2020-09-24 VITALS — HEART RATE: 84 BPM | SYSTOLIC BLOOD PRESSURE: 124 MMHG | DIASTOLIC BLOOD PRESSURE: 70 MMHG

## 2020-09-24 PROCEDURE — 36415 COLL VENOUS BLD VENIPUNCTURE: CPT

## 2020-09-24 PROCEDURE — 99214 OFFICE O/P EST MOD 30 MIN: CPT | Mod: 25

## 2020-09-24 NOTE — ASSESSMENT
[FreeTextEntry1] : Plan:\par 1) HTN: BP acceptable today on current regimen and therefore will not adjust patient's antihypertensive medications. Will reassess pressure and regimen at next evaluation. Advised patient to monitor his pressure at home between now and our next appointment in two weeks, and he agrees that he will obtain a new working BP monitor at home.\par 2) CRF: last eGFR of 45, creatinine of 1.99 down from 3.04 two weeks ago; Have evaluated patient's renal function, blood pressure, and fluid volume status which do not necessitate changes to medications at this time and will thus maintain current therapy. Will continue to monitor function with CMP due to risk for progression of renal failure. \par 3) Elevated LDL: Will reassess on repeat lipid profile and address pending results.\par 4) Fluid volume disorder: patient determined to be clinically euvolemic today through physical exam. We will thus maintain current approach to fluid volume management today and will continue to monitor at future evaluations due to risk of exacerbation of renal failure. \par 5) Gout: Patient to continue f/u with Dr. Stevens for rheumatologic management of his gout and continue on her recommended therapy. Advised patient to f/u with Dr. Stevens between now and our next appointment in two weeks, as we will coordinate on his course of tapering prednisone and the maintenance dose if she so determines that his is adequate treatment for the patient.\par \par Changes to Medications: no change today \par \par Labs were drawn and patient will return in 2 weeks for a follow-up appointment.

## 2020-09-24 NOTE — HISTORY OF PRESENT ILLNESS
[FreeTextEntry1] : Patient is a 45 year old male with PMHx of gout, HTN, proteinuria, microhematuria and CRI.\par \par Patient is feeling generally well since our last visit. He has not been able to check his BP at home as his home cuff is not working well. He is back to baseline activity in walking and daily activities. He weighs 255lbs today, stable. Denies LE swelling. Patient has a f/u with Dr. Wolfe, his PCP. He also reports that Dr. Stevens has decreased his allopurinol from 100mg QD to 50mg QD, and his prednisone from 20mg QD to 10mg QD. He plans to f/u with Dr. Stevens to re evaluate his course of prednisone.\par \par Labs from 9/17/20 reveal: antistreptolysin-O of 1076, , CO2 of 19, BUN 40, creatinine 1.99, eGFR 45, WBC 16.69, HGB 13, HCT 38.6, uric acid 10.5.\par \par Current medications: amlodipine 5mg BID, clonidine 0.2mg PO Q12H, Patient on allopurinol 50mg QD per Dr. Stevens, as well as prednisone 10mg QD which will be tapered.

## 2020-09-24 NOTE — END OF VISIT
[Time Spent: ___ minutes] : I have spent [unfilled] minutes of time on the encounter. [>50% of the face to face encounter time was spent on counseling and/or coordination of care for ___] : Greater than 50% of the face to face encounter time was spent on counseling and/or coordination of care for [unfilled] [FreeTextEntry3] : All medical record entries made by the Scribe were at my, Dr. Devon Gutierrez, direction and personally dictated by me on 09/24/2020. I have reviewed the chart and agree that the record accurately reflects my personal performance of the history, physical exam, assessment and plan. I have also personally directed, reviewed, and agreed with the chart.

## 2020-09-24 NOTE — ADDENDUM
[FreeTextEntry1] :  Documented by Gilberto Ferguson acting as a scribe for Dr. Devon Gutierrez on 09/24/2020.

## 2020-09-27 LAB
ALBUMIN SERPL ELPH-MCNC: 4.2 G/DL
ALP BLD-CCNC: 90 U/L
ALT SERPL-CCNC: 25 U/L
ANION GAP SERPL CALC-SCNC: 15 MMOL/L
APPEARANCE: CLEAR
ASO AB SER LA-ACNC: 893 IU/ML
AST SERPL-CCNC: 25 U/L
BACTERIA: NEGATIVE
BASOPHILS # BLD AUTO: 0.07 K/UL
BASOPHILS NFR BLD AUTO: 0.5 %
BILIRUB SERPL-MCNC: 0.6 MG/DL
BILIRUBIN URINE: NEGATIVE
BLOOD URINE: NORMAL
BUN SERPL-MCNC: 26 MG/DL
C3 SERPL-MCNC: 94 MG/DL
C3D SERPL-MCNC: <12
C4 SERPL-MCNC: 21 MG/DL
CALCIUM SERPL-MCNC: 9.6 MG/DL
CH50 SERPL-MCNC: 45 U/ML
CH50 SERPL-MCNC: 91 U/ML
CHLORIDE ?TM UR-SCNC: <20 MMOL/L
CHLORIDE SERPL-SCNC: 102 MMOL/L
CHOLEST SERPL-MCNC: 189 MG/DL
CHOLEST/HDLC SERPL: 4.1 RATIO
CO2 SERPL-SCNC: 22 MMOL/L
COLOR: NORMAL
CREAT SERPL-MCNC: 1.79 MG/DL
CREAT SPEC-SCNC: 181 MG/DL
CREAT SPEC-SCNC: 246 MG/DL
CREAT/PROT UR: 0.1 RATIO
CREAT/PROT UR: 0.1 RATIO
EOSINOPHIL # BLD AUTO: 0.08 K/UL
EOSINOPHIL NFR BLD AUTO: 0.6 %
GLUCOSE QUALITATIVE U: NEGATIVE
GLUCOSE SERPL-MCNC: 82 MG/DL
HCT VFR BLD CALC: 39.7 %
HDLC SERPL-MCNC: 47 MG/DL
HGB BLD-MCNC: 13.1 G/DL
HYALINE CASTS: 3 /LPF
IMM GRANULOCYTES NFR BLD AUTO: 0.3 %
KETONES URINE: NEGATIVE
LDLC SERPL CALC-MCNC: 106 MG/DL
LEUKOCYTE ESTERASE URINE: NEGATIVE
LYMPHOCYTES # BLD AUTO: 4.04 K/UL
LYMPHOCYTES NFR BLD AUTO: 28 %
MAGNESIUM SERPL-MCNC: 2.2 MG/DL
MAN DIFF?: NORMAL
MCHC RBC-ENTMCNC: 29 PG
MCHC RBC-ENTMCNC: 33 GM/DL
MCV RBC AUTO: 88 FL
MICROSCOPIC-UA: NORMAL
MONOCYTES # BLD AUTO: 1.46 K/UL
MONOCYTES NFR BLD AUTO: 10.1 %
NEUTROPHILS # BLD AUTO: 8.75 K/UL
NEUTROPHILS NFR BLD AUTO: 60.5 %
NITRITE URINE: NEGATIVE
OSMOLALITY UR: 430 MOSM/KG
PH URINE: 5
PHOSPHATE SERPL-MCNC: 3.7 MG/DL
PLATELET # BLD AUTO: 260 K/UL
POTASSIUM SERPL-SCNC: 4.4 MMOL/L
PROT SERPL-MCNC: 8.2 G/DL
PROT UR-MCNC: 17 MG/DL
PROT UR-MCNC: 24 MG/DL
PROTEIN URINE: NORMAL
RBC # BLD: 4.51 M/UL
RBC # FLD: 12.8 %
RED BLOOD CELLS URINE: 1 /HPF
SODIUM ?TM SUB UR QN: 33 MMOL/L
SODIUM SERPL-SCNC: 140 MMOL/L
SPECIFIC GRAVITY URINE: 1.01
SQUAMOUS EPITHELIAL CELLS: 0 /HPF
TRIGL SERPL-MCNC: 184 MG/DL
URATE SERPL-MCNC: 9.6 MG/DL
UROBILINOGEN URINE: NORMAL
WBC # FLD AUTO: 14.45 K/UL
WHITE BLOOD CELLS URINE: 1 /HPF

## 2020-10-02 RX ORDER — PREDNISONE 5 MG/5ML
5 SOLUTION ORAL
Qty: 1 | Refills: 2 | Status: ACTIVE | COMMUNITY
Start: 2020-10-02 | End: 1900-01-01

## 2020-10-05 RX ORDER — PREDNISONE 5 MG/1
5 TABLET ORAL
Qty: 100 | Refills: 2 | Status: ACTIVE | COMMUNITY
Start: 2020-10-02 | End: 1900-01-01

## 2020-10-06 LAB — C3D SERPL-MCNC: <12

## 2020-10-08 ENCOUNTER — APPOINTMENT (OUTPATIENT)
Dept: NEPHROLOGY | Facility: CLINIC | Age: 45
End: 2020-10-08
Payer: SELF-PAY

## 2020-10-08 VITALS — SYSTOLIC BLOOD PRESSURE: 118 MMHG | DIASTOLIC BLOOD PRESSURE: 70 MMHG

## 2020-10-08 VITALS — SYSTOLIC BLOOD PRESSURE: 111 MMHG | DIASTOLIC BLOOD PRESSURE: 78 MMHG | HEART RATE: 104 BPM

## 2020-10-08 VITALS — DIASTOLIC BLOOD PRESSURE: 70 MMHG | SYSTOLIC BLOOD PRESSURE: 118 MMHG

## 2020-10-08 VITALS — DIASTOLIC BLOOD PRESSURE: 83 MMHG | HEART RATE: 87 BPM | SYSTOLIC BLOOD PRESSURE: 126 MMHG

## 2020-10-08 VITALS — HEART RATE: 105 BPM | BODY MASS INDEX: 32.74 KG/M2 | WEIGHT: 255 LBS | OXYGEN SATURATION: 98 %

## 2020-10-08 VITALS — DIASTOLIC BLOOD PRESSURE: 81 MMHG | HEART RATE: 93 BPM | SYSTOLIC BLOOD PRESSURE: 123 MMHG

## 2020-10-08 DIAGNOSIS — Z23 ENCOUNTER FOR IMMUNIZATION: ICD-10-CM

## 2020-10-08 PROCEDURE — 99214 OFFICE O/P EST MOD 30 MIN: CPT | Mod: 25

## 2020-10-08 PROCEDURE — 90686 IIV4 VACC NO PRSV 0.5 ML IM: CPT

## 2020-10-08 PROCEDURE — G0008: CPT

## 2020-10-08 PROCEDURE — 36415 COLL VENOUS BLD VENIPUNCTURE: CPT

## 2020-10-08 NOTE — HISTORY OF PRESENT ILLNESS
[FreeTextEntry1] : Patient is a 45 year old male with PMHx of gout, HTN, proteinuria, microhematuria and CRI.\par \par Patient feels generally well today. He weighs 255lbs; on 9/3 he was 289lbs and has been losing weight over the past month and decrease in LE swelling. He has been maintaining quarantine precautions and does not leave his home very much, but he feels closer to his baseline in terms of daily activities. He has no new complaints today.\par \par 10/2/20 rheumatology evaluation with Dr. Stevens, advised to continue allopurinol and to taper prednisone to 10mg x1 week then down to 5mg and to remain on 5mg QD.\par \par Labs from 9/24/20 reveal: WBC 14.45, anti-streptolysin O 893, triglyceride 184, , BUN 26, creatinine 1.79, eGFR 44, uric acid 9.6.\par \par Current medications: amlodipine 5mg BID, clonidine 0.2mg PO Q12H, Patient on allopurinol 50mg QD per Dr. Stevens, as well as prednisone 10mg QD which will be tapered to 5mg.

## 2020-10-08 NOTE — PHYSICAL EXAM
[General Appearance - Alert] : alert [General Appearance - In No Acute Distress] : in no acute distress [Sclera] : the sclera and conjunctiva were normal [PERRL With Normal Accommodation] : pupils were equal in size, round, and reactive to light [Extraocular Movements] : extraocular movements were intact [Outer Ear] : the ears and nose were normal in appearance [Oropharynx] : the oropharynx was normal [Neck Appearance] : the appearance of the neck was normal [Neck Cervical Mass (___cm)] : no neck mass was observed [Jugular Venous Distention Increased] : there was no jugular-venous distention [Thyroid Diffuse Enlargement] : the thyroid was not enlarged [Thyroid Nodule] : there were no palpable thyroid nodules [Auscultation Breath Sounds / Voice Sounds] : lungs were clear to auscultation bilaterally [Heart Rate And Rhythm] : heart rate was normal and rhythm regular [Heart Sounds] : normal S1 and S2 [Heart Sounds Gallop] : no gallops [Murmurs] : no murmurs [Heart Sounds Pericardial Friction Rub] : no pericardial rub [Bowel Sounds] : normal bowel sounds [Abdomen Soft] : soft [Abdomen Tenderness] : non-tender [Abdomen Mass (___ Cm)] : no abdominal mass palpated [No CVA Tenderness] : no ~M costovertebral angle tenderness [No Spinal Tenderness] : no spinal tenderness [Abnormal Walk] : normal gait [Nail Clubbing] : no clubbing  or cyanosis of the fingernails [Musculoskeletal - Swelling] : no joint swelling seen [Motor Tone] : muscle strength and tone were normal [Skin Color & Pigmentation] : normal skin color and pigmentation [Skin Turgor] : normal skin turgor [] : no rash [No Focal Deficits] : no focal deficits [Oriented To Time, Place, And Person] : oriented to person, place, and time [Impaired Insight] : insight and judgment were intact [Affect] : the affect was normal [Edema] : there was no peripheral edema

## 2020-10-08 NOTE — END OF VISIT
[Time Spent: ___ minutes] : I have spent [unfilled] minutes of time on the encounter. [>50% of the face to face encounter time was spent on counseling and/or coordination of care for ___] : Greater than 50% of the face to face encounter time was spent on counseling and/or coordination of care for [unfilled] [FreeTextEntry3] : All medical record entries made by the Scribe were at my, Dr. Devon Gutierrez, direction and personally dictated by me on 10/08/2020. I have reviewed the chart and agree that the record accurately reflects my personal performance of the history, physical exam, assessment and plan. I have also personally directed, reviewed, and agreed with the chart.

## 2020-10-08 NOTE — ADDENDUM
[FreeTextEntry1] :  Documented by Gilberto Ferguson acting as a scribe for Dr. Devon Gutierrez on 10/08/2020.

## 2020-10-08 NOTE — ASSESSMENT
[FreeTextEntry1] : Plan:\par 1) HTN: BP acceptable today, although he does exhibit an orthostatic drop in pressure upon standing. Now will decrease amlodipine from 5mg BID to 5mg QD, and continue the remainder of his antihypertensive regimen. Will reassess pressure and regimen at next evaluation. \par 2) CRI: last eGFR of 44 is stable for patient. Continue to monitor on repeat CMP. \par 3) Elevated LDL: Will continue to monitor on lipid profile and consider therapy if needed.\par 4) Gout: Patient now on tapering dose of prednisone per Dr. Stevens. Patient will continue to f/u with Dr. Stevens for rheumatologic management of gout.\par 5) Have administered quadrivalent influenza vaccine into left deltoid without incident. \par \par Changes to Medications: decrease amlodipine from 5mg BID to 5mg QD.\par \par Labs were drawn and patient will return in 2 weeks for a follow-up appointment.

## 2020-10-09 LAB
ALBUMIN SERPL ELPH-MCNC: 4.4 G/DL
ALP BLD-CCNC: 82 U/L
ALT SERPL-CCNC: 27 U/L
ANION GAP SERPL CALC-SCNC: 15 MMOL/L
APPEARANCE: ABNORMAL
ASO AB SER LA-ACNC: 484 IU/ML
AST SERPL-CCNC: 25 U/L
BACTERIA: NEGATIVE
BASOPHILS # BLD AUTO: 0.05 K/UL
BASOPHILS NFR BLD AUTO: 0.4 %
BILIRUB SERPL-MCNC: 0.8 MG/DL
BILIRUBIN URINE: NEGATIVE
BLOOD URINE: ABNORMAL
BUN SERPL-MCNC: 15 MG/DL
CALCIUM SERPL-MCNC: 9.9 MG/DL
CHLORIDE SERPL-SCNC: 104 MMOL/L
CHOLEST SERPL-MCNC: 203 MG/DL
CHOLEST/HDLC SERPL: 5 RATIO
CO2 SERPL-SCNC: 23 MMOL/L
COLOR: YELLOW
CREAT SERPL-MCNC: 1.54 MG/DL
CREAT SPEC-SCNC: 367 MG/DL
CREAT/PROT UR: 0.2 RATIO
CYSTATIN C SERPL-MCNC: 1.68 MG/L
EOSINOPHIL # BLD AUTO: 0.06 K/UL
EOSINOPHIL NFR BLD AUTO: 0.5 %
GFR/BSA.PRED SERPLBLD CYS-BASED-ARV: 41 ML/MIN
GLUCOSE QUALITATIVE U: NEGATIVE
GLUCOSE SERPL-MCNC: 71 MG/DL
HCT VFR BLD CALC: 40.9 %
HDLC SERPL-MCNC: 41 MG/DL
HGB BLD-MCNC: 13.3 G/DL
HYALINE CASTS: 8 /LPF
IMM GRANULOCYTES NFR BLD AUTO: 0.6 %
KETONES URINE: NEGATIVE
LDLC SERPL CALC-MCNC: 120 MG/DL
LEUKOCYTE ESTERASE URINE: NEGATIVE
LYMPHOCYTES # BLD AUTO: 4.04 K/UL
LYMPHOCYTES NFR BLD AUTO: 30.5 %
MAGNESIUM SERPL-MCNC: 2 MG/DL
MAN DIFF?: NORMAL
MCHC RBC-ENTMCNC: 29.1 PG
MCHC RBC-ENTMCNC: 32.5 GM/DL
MCV RBC AUTO: 89.5 FL
MICROSCOPIC-UA: NORMAL
MONOCYTES # BLD AUTO: 1.48 K/UL
MONOCYTES NFR BLD AUTO: 11.2 %
NEUTROPHILS # BLD AUTO: 7.54 K/UL
NEUTROPHILS NFR BLD AUTO: 56.8 %
NITRITE URINE: NEGATIVE
PH URINE: 5.5
PHOSPHATE SERPL-MCNC: 2.8 MG/DL
PLATELET # BLD AUTO: 261 K/UL
POTASSIUM SERPL-SCNC: 4.1 MMOL/L
PROT SERPL-MCNC: 7.9 G/DL
PROT UR-MCNC: 84 MG/DL
PROTEIN URINE: ABNORMAL
RBC # BLD: 4.57 M/UL
RBC # FLD: 13.3 %
RED BLOOD CELLS URINE: 5 /HPF
SODIUM SERPL-SCNC: 141 MMOL/L
SPECIFIC GRAVITY URINE: 1.02
SQUAMOUS EPITHELIAL CELLS: 2 /HPF
TRIGL SERPL-MCNC: 209 MG/DL
TSH SERPL-ACNC: 2.18 UIU/ML
URATE SERPL-MCNC: 8.2 MG/DL
URINE COMMENTS: NORMAL
UROBILINOGEN URINE: NORMAL
WBC # FLD AUTO: 13.25 K/UL
WHITE BLOOD CELLS URINE: 4 /HPF

## 2020-10-12 LAB
C3 SERPL-MCNC: 136 MG/DL
C4 SERPL-MCNC: 25 MG/DL
CH50 SERPL-MCNC: 85 U/ML

## 2020-10-20 LAB — C3 NEPHRITIC FACTOR: NORMAL

## 2020-10-29 ENCOUNTER — LABORATORY RESULT (OUTPATIENT)
Age: 45
End: 2020-10-29

## 2020-10-29 ENCOUNTER — APPOINTMENT (OUTPATIENT)
Dept: NEPHROLOGY | Facility: CLINIC | Age: 45
End: 2020-10-29
Payer: COMMERCIAL

## 2020-10-29 VITALS — DIASTOLIC BLOOD PRESSURE: 91 MMHG | SYSTOLIC BLOOD PRESSURE: 142 MMHG | HEART RATE: 81 BPM

## 2020-10-29 VITALS — SYSTOLIC BLOOD PRESSURE: 141 MMHG | DIASTOLIC BLOOD PRESSURE: 89 MMHG | HEART RATE: 98 BPM

## 2020-10-29 VITALS — WEIGHT: 255 LBS | HEART RATE: 93 BPM | TEMPERATURE: 99 F | OXYGEN SATURATION: 98 % | BODY MASS INDEX: 32.74 KG/M2

## 2020-10-29 VITALS — DIASTOLIC BLOOD PRESSURE: 98 MMHG | SYSTOLIC BLOOD PRESSURE: 147 MMHG | HEART RATE: 91 BPM

## 2020-10-29 PROCEDURE — 99214 OFFICE O/P EST MOD 30 MIN: CPT | Mod: 25

## 2020-10-29 PROCEDURE — 36415 COLL VENOUS BLD VENIPUNCTURE: CPT

## 2020-10-29 PROCEDURE — 99072 ADDL SUPL MATRL&STAF TM PHE: CPT

## 2020-10-29 NOTE — HISTORY OF PRESENT ILLNESS
[FreeTextEntry1] : Patient is a 45 year old male with PMHx of gout, HTN, proteinuria, microhematuria and CRI.\par \par Patient feels generally well today and notes that he is feeling back to his normal self. Patient is still on allopurinol but no longer on prednisone. Patient denies any new complaints. He notes that he has not had an attack of gout recently and he has had it for approximately 20 years. Patient states that he did not have hypertension prior to his hospitalization in August, and has not been on a diuretic.\par \par Labs from 10/8/20 reveal: WBC 13.25, urine RBC 5, urine protein 30, blood in urine small, antistreptolysin O 484, triglyceride 209, total cholesterol 203, , creatinine 1.54, eGFR 61.\par \par Current medications: amlodipine 5mg QD, clonidine 0.2mg PO Q12H, Patient on allopurinol 50mg QD per Dr. Stevens.

## 2020-10-29 NOTE — ADDENDUM
[FreeTextEntry1] :  Documented by Gilberto Ferguson acting as a scribe for Dr. Devon Gutierrez on 10/29/2020.

## 2020-10-29 NOTE — END OF VISIT
[Time Spent: ___ minutes] : I have spent [unfilled] minutes of time on the encounter. [>50% of the face to face encounter time was spent on counseling and/or coordination of care for ___] : Greater than 50% of the face to face encounter time was spent on counseling and/or coordination of care for [unfilled] [FreeTextEntry3] : All medical record entries made by the Scribe were at my, Dr. Devon Gutierrez, direction and personally dictated by me on 10/29/2020. I have reviewed the chart and agree that the record accurately reflects my personal performance of the history, physical exam, assessment and plan. I have also personally directed, reviewed, and agreed with the chart.

## 2020-10-29 NOTE — ASSESSMENT
[FreeTextEntry1] : Plan:\par 1) HTN: Patient's BP is hypertensive in the office today. Will now start patient on clonidine patch #3 weekly. Advised to take his PO clonidine 0.2mg for two day, then after two days with the patch to take oral clonidine 0.1mg BID for one day, and then the next day to discontinue PO clonidine all together. Continue on remainder of antihypertensive regimen. Considering starting a diuretic, however do not want to spark an episode of gout so will make the above changes and reassess at next visit, and will discuss with Dr. Stevens, rheumatology, if more intervention is necessary. \par 2) CRI: last eGFR of 61 is stable for patient. Continue to monitor on repeat CMP. \par 3) HLD: last lipid profile was elevated. Will reassess on repeat labs today.\par 4) ASLO of 484: Patient's antistreptolysin O screen has been improving since his hospitalization when it was 1172. Continue to monitor on labs.\par \par Changes to Medications: start clonidine #3 patch QW. Tapering dose of oral clonidine as above. \par \par Labs were drawn and patient will return in 3 weeks for a follow-up appointment.

## 2020-11-01 LAB
ALBUMIN SERPL ELPH-MCNC: 4.4 G/DL
ALDOSTERONE SERUM: 5.8 NG/DL
ALP BLD-CCNC: 80 U/L
ALT SERPL-CCNC: 24 U/L
ANA SER IF-ACNC: NEGATIVE
ANION GAP SERPL CALC-SCNC: 14 MMOL/L
APPEARANCE: CLEAR
ASO AB SER LA-ACNC: 423 IU/ML
AST SERPL-CCNC: 29 U/L
BACTERIA: NEGATIVE
BASOPHILS # BLD AUTO: 0.03 K/UL
BASOPHILS NFR BLD AUTO: 0.4 %
BILIRUB SERPL-MCNC: 0.6 MG/DL
BILIRUBIN URINE: NEGATIVE
BLOOD URINE: ABNORMAL
BUN SERPL-MCNC: 12 MG/DL
C3 SERPL-MCNC: 146 MG/DL
C4 SERPL-MCNC: 26 MG/DL
CALCIUM SERPL-MCNC: 10 MG/DL
CHLORIDE SERPL-SCNC: 107 MMOL/L
CHOLEST SERPL-MCNC: 176 MG/DL
CO2 SERPL-SCNC: 21 MMOL/L
COLOR: NORMAL
CREAT SERPL-MCNC: 1.23 MG/DL
CREAT SPEC-SCNC: 168 MG/DL
CREAT/PROT UR: 0.1 RATIO
CRP SERPL-MCNC: 0.15 MG/DL
CYSTATIN C SERPL-MCNC: 1.2 MG/L
EOSINOPHIL # BLD AUTO: 0.06 K/UL
EOSINOPHIL NFR BLD AUTO: 0.8 %
ERYTHROCYTE [SEDIMENTATION RATE] IN BLOOD BY WESTERGREN METHOD: 41 MM/HR
FERRITIN SERPL-MCNC: 245 NG/ML
FOLATE SERPL-MCNC: 15.2 NG/ML
GFR/BSA.PRED SERPLBLD CYS-BASED-ARV: 64 ML/MIN
GLUCOSE QUALITATIVE U: NEGATIVE
GLUCOSE SERPL-MCNC: 96 MG/DL
HBV SURFACE AB SER QL: NONREACTIVE
HBV SURFACE AG SER QL: NONREACTIVE
HCT VFR BLD CALC: 40.3 %
HCV AB SER QL: NONREACTIVE
HCV S/CO RATIO: 0.37 S/CO
HCYS SERPL-MCNC: 19.6 UMOL/L
HDLC SERPL-MCNC: 35 MG/DL
HGB BLD-MCNC: 13.3 G/DL
HYALINE CASTS: 1 /LPF
IMM GRANULOCYTES NFR BLD AUTO: 0.3 %
IRON SATN MFR SERPL: 21 %
IRON SERPL-MCNC: 61 UG/DL
KETONES URINE: NEGATIVE
LDLC SERPL CALC-MCNC: 108 MG/DL
LEUKOCYTE ESTERASE URINE: NEGATIVE
LYMPHOCYTES # BLD AUTO: 2.51 K/UL
LYMPHOCYTES NFR BLD AUTO: 32.4 %
MAGNESIUM SERPL-MCNC: 1.8 MG/DL
MAN DIFF?: NORMAL
MCHC RBC-ENTMCNC: 29.3 PG
MCHC RBC-ENTMCNC: 33 GM/DL
MCV RBC AUTO: 88.8 FL
MICROSCOPIC-UA: NORMAL
MONOCYTES # BLD AUTO: 0.77 K/UL
MONOCYTES NFR BLD AUTO: 9.9 %
MPO AB + PR3 PNL SER: NORMAL
NEUTROPHILS # BLD AUTO: 4.36 K/UL
NEUTROPHILS NFR BLD AUTO: 56.2 %
NITRITE URINE: NEGATIVE
NONHDLC SERPL-MCNC: 141 MG/DL
PH URINE: 5
PHOSPHATE SERPL-MCNC: 4 MG/DL
PLATELET # BLD AUTO: 268 K/UL
POTASSIUM SERPL-SCNC: 4 MMOL/L
PROT SERPL-MCNC: 8 G/DL
PROT UR-MCNC: 14 MG/DL
PROTEIN URINE: NEGATIVE
RBC # BLD: 4.54 M/UL
RBC # FLD: 13.9 %
RED BLOOD CELLS URINE: 1 /HPF
RENIN PLASMA: 11.5 PG/ML
RHEUMATOID FACT SER QL: <10 IU/ML
SODIUM SERPL-SCNC: 142 MMOL/L
SPECIFIC GRAVITY URINE: 1.02
SQUAMOUS EPITHELIAL CELLS: 1 /HPF
T3FREE SERPL-MCNC: 3.39 PG/ML
T3RU NFR SERPL: 0.9 TBI
T4 FREE SERPL-MCNC: 1.3 NG/DL
T4 SERPL-MCNC: 5.6 UG/DL
THYROGLOB AB SERPL-ACNC: <20 IU/ML
THYROPEROXIDASE AB SERPL IA-ACNC: <10 IU/ML
TIBC SERPL-MCNC: 291 UG/DL
TRIGL SERPL-MCNC: 162 MG/DL
TSH SERPL-ACNC: 1.32 UIU/ML
UIBC SERPL-MCNC: 230 UG/DL
URATE SERPL-MCNC: 9 MG/DL
UROBILINOGEN URINE: NORMAL
VIT B12 SERPL-MCNC: 906 PG/ML
WBC # FLD AUTO: 7.75 K/UL
WHITE BLOOD CELLS URINE: 2 /HPF

## 2020-11-06 LAB
ALBUMIN MFR SERPL ELPH: 54 %
ALBUMIN SERPL-MCNC: 4.3 G/DL
ALBUMIN/GLOB SERPL: 1.2 RATIO
ALPHA1 GLOB MFR SERPL ELPH: 3.3 %
ALPHA1 GLOB SERPL ELPH-MCNC: 0.3 G/DL
ALPHA2 GLOB MFR SERPL ELPH: 8.8 %
ALPHA2 GLOB SERPL ELPH-MCNC: 0.7 G/DL
B-GLOBULIN MFR SERPL ELPH: 10.8 %
B-GLOBULIN SERPL ELPH-MCNC: 0.9 G/DL
DEPRECATED KAPPA LC FREE/LAMBDA SER: 2.7 RATIO
GAMMA GLOB FLD ELPH-MCNC: 1.8 G/DL
GAMMA GLOB MFR SERPL ELPH: 23.1 %
IGA SER QL IEP: 213 MG/DL
IGG SER QL IEP: 2075 MG/DL
IGM SER QL IEP: 89 MG/DL
INTERPRETATION SERPL IEP-IMP: NORMAL
KAPPA LC CSF-MCNC: 2.15 MG/DL
KAPPA LC SERPL-MCNC: 5.8 MG/DL
M PROTEIN SPEC IFE-MCNC: NORMAL
METHYLMALONATE SERPL-SCNC: 145 NMOL/L
PROT SERPL-MCNC: 8 G/DL
PROT SERPL-MCNC: 8 G/DL

## 2020-11-24 ENCOUNTER — APPOINTMENT (OUTPATIENT)
Dept: NEPHROLOGY | Facility: CLINIC | Age: 45
End: 2020-11-24
Payer: SELF-PAY

## 2020-11-24 ENCOUNTER — LABORATORY RESULT (OUTPATIENT)
Age: 45
End: 2020-11-24

## 2020-11-24 VITALS — TEMPERATURE: 99 F | OXYGEN SATURATION: 98 % | BODY MASS INDEX: 32.1 KG/M2 | HEART RATE: 79 BPM | WEIGHT: 250 LBS

## 2020-11-24 VITALS — SYSTOLIC BLOOD PRESSURE: 124 MMHG | HEART RATE: 84 BPM | DIASTOLIC BLOOD PRESSURE: 70 MMHG

## 2020-11-24 VITALS — HEART RATE: 84 BPM | SYSTOLIC BLOOD PRESSURE: 120 MMHG | DIASTOLIC BLOOD PRESSURE: 70 MMHG

## 2020-11-24 VITALS — SYSTOLIC BLOOD PRESSURE: 124 MMHG | DIASTOLIC BLOOD PRESSURE: 80 MMHG | HEART RATE: 84 BPM

## 2020-11-24 PROCEDURE — 36415 COLL VENOUS BLD VENIPUNCTURE: CPT

## 2020-11-24 PROCEDURE — 99214 OFFICE O/P EST MOD 30 MIN: CPT | Mod: 25

## 2020-11-24 NOTE — ADDENDUM
[FreeTextEntry1] :  Documented by Gilberto Ferguson acting as a scribe for Dr. Devon Gutierrez on 11/24/2020.

## 2020-11-24 NOTE — END OF VISIT
[>50% of the face to face encounter time was spent on counseling and/or coordination of care for ___] : Greater than 50% of the face to face encounter time was spent on counseling and/or coordination of care for [unfilled] [Time Spent: ___ minutes] : I have spent [unfilled] minutes of time on the encounter. [FreeTextEntry3] : All medical record entries made by the Scribe were at my, Dr. Devon Gutierrez, direction and personally dictated by me on 11/24/2020. I have reviewed the chart and agree that the record accurately reflects my personal performance of the history, physical exam, assessment and plan. I have also personally directed, reviewed, and agreed with the chart.

## 2020-11-24 NOTE — ASSESSMENT
[FreeTextEntry1] : Plan:\par 1) HTN: BP acceptable today on current regimen and therefore will not adjust patient's antihypertensive medications. He will remain on amlodipine 5mg daily and clonidine patch #3 weekly (continue without PO clonidine). Will reassess pressure and regimen at next evaluation. \par 2) CRI: last eGFR of 69 is stable for patient. Continue to monitor on repeat CMP. \par 3) Gout: Patient has not had recent gout attack on his current regimen of allopurinol per Dr. Stevens with whom the patient will continue to follow.\par 4) Patient received annual influenza shot at his last visit to our office.\par 5) Chronic back pain: Referred patient to Dr. Dhaliwal for a neurology consult.\par 6) Hyperpigmentation: Referred patient to Dr. Franco for dermatology consult.\par 7) Hypercholesterolemia: Patient with borderline elevated lipid profile on last labs. Counseled patient on managing this with low fat diet and exercise. Will continue to monitor on future labs.\par \par Changes to Medications: no change today\par \par Labs were drawn and patient will return in 4-6 weeks for a follow-up appointment.

## 2020-11-24 NOTE — HISTORY OF PRESENT ILLNESS
[FreeTextEntry1] : Patient is a 45 year old male presenting for f/u evaluation for BP management and active reassessment of gout, cervical radiculopathy with chronic back pain, proteinuria, microhematuria and CRI.\par \par The patient feels generally well with no acute complaints. He is taking clonidine patch #3 weekly and is no longer taking PO clonidine. He denies any recent episodes of gout.\par Patient notes that he has new areas of hyperpigmentation on his torso.\par He reports that he has continued chronic back pain from radiculopathy, followed by Dr. Shaw for pain management. His last MRI was a few years ago. He has not had neurology evaluation.\par \par Labs from 10/30/20 reveal: CO2 of 21, creatinine 1.23, eGFR 69, anti-streptolysin O 423, triglyceride 162, HDL 35, , uric acid 9, sed rate 41, small amount of blood in urine.\par \par Current medications: amlodipine 5mg QD, clonidine patch #3 QW. Patient on allopurinol 50mg QD per Dr. Stevens.

## 2020-11-25 ENCOUNTER — RX CHANGE (OUTPATIENT)
Age: 45
End: 2020-11-25

## 2020-11-25 RX ORDER — CLONIDINE 0.2 MG/24H
0.2 PATCH, EXTENDED RELEASE TRANSDERMAL
Qty: 4 | Refills: 2 | Status: DISCONTINUED | COMMUNITY
Start: 2020-09-10 | End: 2020-11-25

## 2020-11-25 RX ORDER — CLONIDINE 0.2 MG/24H
0.2 PATCH, EXTENDED RELEASE TRANSDERMAL
Qty: 12 | Refills: 1 | Status: ACTIVE | COMMUNITY
Start: 2020-11-25 | End: 1900-01-01

## 2020-11-29 LAB
ALBUMIN SERPL ELPH-MCNC: 4.6 G/DL
ALP BLD-CCNC: 79 U/L
ALT SERPL-CCNC: 30 U/L
ANION GAP SERPL CALC-SCNC: 8 MMOL/L
APPEARANCE: CLEAR
ASO AB SER LA-ACNC: 355 IU/ML
AST SERPL-CCNC: 39 U/L
BACTERIA: NEGATIVE
BASOPHILS # BLD AUTO: 0.04 K/UL
BASOPHILS NFR BLD AUTO: 0.6 %
BILIRUB SERPL-MCNC: 1.1 MG/DL
BILIRUBIN URINE: NEGATIVE
BLOOD URINE: ABNORMAL
BUN SERPL-MCNC: 9 MG/DL
CALCIUM SERPL-MCNC: 10.3 MG/DL
CHLORIDE SERPL-SCNC: 107 MMOL/L
CHOLEST SERPL-MCNC: 194 MG/DL
CO2 SERPL-SCNC: 26 MMOL/L
COLOR: YELLOW
CREAT SERPL-MCNC: 1.32 MG/DL
CRP SERPL-MCNC: 0.18 MG/DL
CYSTATIN C SERPL-MCNC: 1.2 MG/L
EOSINOPHIL # BLD AUTO: 0.07 K/UL
EOSINOPHIL NFR BLD AUTO: 1 %
ERYTHROCYTE [SEDIMENTATION RATE] IN BLOOD BY WESTERGREN METHOD: 27 MM/HR
FERRITIN SERPL-MCNC: 177 NG/ML
FOLATE SERPL-MCNC: 10.3 NG/ML
GFR/BSA.PRED SERPLBLD CYS-BASED-ARV: 64 ML/MIN
GLUCOSE QUALITATIVE U: NEGATIVE
GLUCOSE SERPL-MCNC: 77 MG/DL
HCT VFR BLD CALC: 41.8 %
HCYS SERPL-MCNC: 19.1 UMOL/L
HDLC SERPL-MCNC: 31 MG/DL
HGB BLD-MCNC: 14 G/DL
HYALINE CASTS: 2 /LPF
IMM GRANULOCYTES NFR BLD AUTO: 0.1 %
IRON SATN MFR SERPL: 41 %
IRON SERPL-MCNC: 120 UG/DL
KETONES URINE: NEGATIVE
LDLC SERPL CALC-MCNC: 109 MG/DL
LEUKOCYTE ESTERASE URINE: NEGATIVE
LYMPHOCYTES # BLD AUTO: 2.62 K/UL
LYMPHOCYTES NFR BLD AUTO: 38.9 %
MAGNESIUM SERPL-MCNC: 2.2 MG/DL
MAN DIFF?: NORMAL
MCHC RBC-ENTMCNC: 30.2 PG
MCHC RBC-ENTMCNC: 33.5 GM/DL
MCV RBC AUTO: 90.1 FL
MICROSCOPIC-UA: NORMAL
MONOCYTES # BLD AUTO: 0.73 K/UL
MONOCYTES NFR BLD AUTO: 10.8 %
NEUTROPHILS # BLD AUTO: 3.27 K/UL
NEUTROPHILS NFR BLD AUTO: 48.6 %
NITRITE URINE: NEGATIVE
NONHDLC SERPL-MCNC: 164 MG/DL
PH URINE: 5
PHOSPHATE SERPL-MCNC: 2.9 MG/DL
PLATELET # BLD AUTO: 289 K/UL
POTASSIUM SERPL-SCNC: 4 MMOL/L
PROT SERPL-MCNC: 8 G/DL
PROTEIN URINE: NORMAL
RBC # BLD: 4.64 M/UL
RBC # FLD: 13.5 %
RED BLOOD CELLS URINE: 1 /HPF
SODIUM SERPL-SCNC: 140 MMOL/L
SPECIFIC GRAVITY URINE: 1.02
SQUAMOUS EPITHELIAL CELLS: 0 /HPF
T3FREE SERPL-MCNC: 3.38 PG/ML
T3RU NFR SERPL: 0.9 TBI
T4 FREE SERPL-MCNC: 1.3 NG/DL
T4 SERPL-MCNC: 5.3 UG/DL
THYROGLOB AB SERPL-ACNC: <20 IU/ML
THYROPEROXIDASE AB SERPL IA-ACNC: <10 IU/ML
TIBC SERPL-MCNC: 294 UG/DL
TRIGL SERPL-MCNC: 276 MG/DL
TSH SERPL-ACNC: 1.1 UIU/ML
UIBC SERPL-MCNC: 174 UG/DL
URATE SERPL-MCNC: 8.9 MG/DL
UROBILINOGEN URINE: NORMAL
VIT B12 SERPL-MCNC: 778 PG/ML
WBC # FLD AUTO: 6.74 K/UL
WHITE BLOOD CELLS URINE: 0 /HPF

## 2020-12-01 LAB
ALBUMIN MFR SERPL ELPH: 56 %
ALBUMIN SERPL-MCNC: 4.5 G/DL
ALBUMIN/GLOB SERPL: 1.3 RATIO
ALPHA1 GLOB MFR SERPL ELPH: 2.9 %
ALPHA1 GLOB SERPL ELPH-MCNC: 0.2 G/DL
ALPHA2 GLOB MFR SERPL ELPH: 7.7 %
ALPHA2 GLOB SERPL ELPH-MCNC: 0.6 G/DL
B-GLOBULIN MFR SERPL ELPH: 11 %
B-GLOBULIN SERPL ELPH-MCNC: 0.9 G/DL
DEPRECATED KAPPA LC FREE/LAMBDA SER: 2.72 RATIO
GAMMA GLOB FLD ELPH-MCNC: 1.8 G/DL
GAMMA GLOB MFR SERPL ELPH: 22.4 %
IGA SER QL IEP: 193 MG/DL
IGG SER QL IEP: 2106 MG/DL
IGM SER QL IEP: 84 MG/DL
INTERPRETATION SERPL IEP-IMP: NORMAL
KAPPA LC CSF-MCNC: 2.1 MG/DL
KAPPA LC SERPL-MCNC: 5.71 MG/DL
M PROTEIN SPEC IFE-MCNC: NORMAL
PROT SERPL-MCNC: 8 G/DL
PROT SERPL-MCNC: 8 G/DL

## 2020-12-04 LAB — METHYLMALONATE SERPL-SCNC: 148 NMOL/L

## 2021-01-04 ENCOUNTER — APPOINTMENT (OUTPATIENT)
Dept: NEPHROLOGY | Facility: CLINIC | Age: 46
End: 2021-01-04

## 2021-01-05 ENCOUNTER — APPOINTMENT (OUTPATIENT)
Dept: NEUROSURGERY | Facility: CLINIC | Age: 46
End: 2021-01-05

## 2021-02-02 ENCOUNTER — APPOINTMENT (OUTPATIENT)
Dept: NEPHROLOGY | Facility: CLINIC | Age: 46
End: 2021-02-02

## 2021-02-16 ENCOUNTER — EMERGENCY (EMERGENCY)
Facility: HOSPITAL | Age: 46
LOS: 1 days | Discharge: HOME | End: 2021-02-16
Attending: EMERGENCY MEDICINE | Admitting: EMERGENCY MEDICINE
Payer: COMMERCIAL

## 2021-02-16 VITALS
OXYGEN SATURATION: 98 % | DIASTOLIC BLOOD PRESSURE: 67 MMHG | HEART RATE: 49 BPM | SYSTOLIC BLOOD PRESSURE: 108 MMHG | RESPIRATION RATE: 18 BRPM | TEMPERATURE: 98 F

## 2021-02-16 VITALS
DIASTOLIC BLOOD PRESSURE: 87 MMHG | WEIGHT: 244.93 LBS | TEMPERATURE: 98 F | HEART RATE: 65 BPM | HEIGHT: 74 IN | OXYGEN SATURATION: 98 % | SYSTOLIC BLOOD PRESSURE: 150 MMHG | RESPIRATION RATE: 18 BRPM

## 2021-02-16 DIAGNOSIS — Z79.899 OTHER LONG TERM (CURRENT) DRUG THERAPY: ICD-10-CM

## 2021-02-16 DIAGNOSIS — Z88.8 ALLERGY STATUS TO OTHER DRUGS, MEDICAMENTS AND BIOLOGICAL SUBSTANCES: ICD-10-CM

## 2021-02-16 DIAGNOSIS — R07.89 OTHER CHEST PAIN: ICD-10-CM

## 2021-02-16 DIAGNOSIS — Z20.822 CONTACT WITH AND (SUSPECTED) EXPOSURE TO COVID-19: ICD-10-CM

## 2021-02-16 DIAGNOSIS — I10 ESSENTIAL (PRIMARY) HYPERTENSION: ICD-10-CM

## 2021-02-16 LAB
ALBUMIN SERPL ELPH-MCNC: 4.2 G/DL — SIGNIFICANT CHANGE UP (ref 3.3–5)
ALP SERPL-CCNC: 73 U/L — SIGNIFICANT CHANGE UP (ref 40–120)
ALT FLD-CCNC: 40 U/L — SIGNIFICANT CHANGE UP (ref 10–45)
ANION GAP SERPL CALC-SCNC: 13 MMOL/L — SIGNIFICANT CHANGE UP (ref 5–17)
APPEARANCE UR: CLEAR — SIGNIFICANT CHANGE UP
APTT BLD: 27.9 SEC — SIGNIFICANT CHANGE UP (ref 27.5–35.5)
AST SERPL-CCNC: 59 U/L — HIGH (ref 10–40)
BACTERIA # UR AUTO: PRESENT /HPF
BASOPHILS # BLD AUTO: 0.04 K/UL — SIGNIFICANT CHANGE UP (ref 0–0.2)
BASOPHILS NFR BLD AUTO: 0.5 % — SIGNIFICANT CHANGE UP (ref 0–2)
BILIRUB SERPL-MCNC: 0.7 MG/DL — SIGNIFICANT CHANGE UP (ref 0.2–1.2)
BILIRUB UR-MCNC: NEGATIVE — SIGNIFICANT CHANGE UP
BUN SERPL-MCNC: 14 MG/DL — SIGNIFICANT CHANGE UP (ref 7–23)
CALCIUM SERPL-MCNC: 9.8 MG/DL — SIGNIFICANT CHANGE UP (ref 8.4–10.5)
CHLORIDE SERPL-SCNC: 106 MMOL/L — SIGNIFICANT CHANGE UP (ref 96–108)
CK MB CFR SERPL CALC: 12.6 NG/ML — HIGH (ref 0–6.7)
CK SERPL-CCNC: 1641 U/L — HIGH (ref 30–200)
CO2 SERPL-SCNC: 23 MMOL/L — SIGNIFICANT CHANGE UP (ref 22–31)
COLOR SPEC: YELLOW — SIGNIFICANT CHANGE UP
CREAT SERPL-MCNC: 1.22 MG/DL — SIGNIFICANT CHANGE UP (ref 0.5–1.3)
DIFF PNL FLD: ABNORMAL
EOSINOPHIL # BLD AUTO: 0.1 K/UL — SIGNIFICANT CHANGE UP (ref 0–0.5)
EOSINOPHIL NFR BLD AUTO: 1.3 % — SIGNIFICANT CHANGE UP (ref 0–6)
EPI CELLS # UR: SIGNIFICANT CHANGE UP /HPF (ref 0–5)
GLUCOSE SERPL-MCNC: 99 MG/DL — SIGNIFICANT CHANGE UP (ref 70–99)
GLUCOSE UR QL: NEGATIVE — SIGNIFICANT CHANGE UP
HCT VFR BLD CALC: 41.6 % — SIGNIFICANT CHANGE UP (ref 39–50)
HGB BLD-MCNC: 13.8 G/DL — SIGNIFICANT CHANGE UP (ref 13–17)
IMM GRANULOCYTES NFR BLD AUTO: 0.3 % — SIGNIFICANT CHANGE UP (ref 0–1.5)
INR BLD: 1.06 — SIGNIFICANT CHANGE UP (ref 0.88–1.16)
KETONES UR-MCNC: NEGATIVE — SIGNIFICANT CHANGE UP
LEUKOCYTE ESTERASE UR-ACNC: NEGATIVE — SIGNIFICANT CHANGE UP
LYMPHOCYTES # BLD AUTO: 2.12 K/UL — SIGNIFICANT CHANGE UP (ref 1–3.3)
LYMPHOCYTES # BLD AUTO: 27.2 % — SIGNIFICANT CHANGE UP (ref 13–44)
MCHC RBC-ENTMCNC: 28.5 PG — SIGNIFICANT CHANGE UP (ref 27–34)
MCHC RBC-ENTMCNC: 33.2 GM/DL — SIGNIFICANT CHANGE UP (ref 32–36)
MCV RBC AUTO: 86 FL — SIGNIFICANT CHANGE UP (ref 80–100)
MONOCYTES # BLD AUTO: 0.64 K/UL — SIGNIFICANT CHANGE UP (ref 0–0.9)
MONOCYTES NFR BLD AUTO: 8.2 % — SIGNIFICANT CHANGE UP (ref 2–14)
NEUTROPHILS # BLD AUTO: 4.88 K/UL — SIGNIFICANT CHANGE UP (ref 1.8–7.4)
NEUTROPHILS NFR BLD AUTO: 62.5 % — SIGNIFICANT CHANGE UP (ref 43–77)
NITRITE UR-MCNC: NEGATIVE — SIGNIFICANT CHANGE UP
NRBC # BLD: 0 /100 WBCS — SIGNIFICANT CHANGE UP (ref 0–0)
PH UR: 6.5 — SIGNIFICANT CHANGE UP (ref 5–8)
PLATELET # BLD AUTO: 252 K/UL — SIGNIFICANT CHANGE UP (ref 150–400)
POTASSIUM SERPL-MCNC: 4.5 MMOL/L — SIGNIFICANT CHANGE UP (ref 3.5–5.3)
POTASSIUM SERPL-SCNC: 4.5 MMOL/L — SIGNIFICANT CHANGE UP (ref 3.5–5.3)
PROT SERPL-MCNC: 7.8 G/DL — SIGNIFICANT CHANGE UP (ref 6–8.3)
PROT UR-MCNC: NEGATIVE MG/DL — SIGNIFICANT CHANGE UP
PROTHROM AB SERPL-ACNC: 12.7 SEC — SIGNIFICANT CHANGE UP (ref 10.6–13.6)
RBC # BLD: 4.84 M/UL — SIGNIFICANT CHANGE UP (ref 4.2–5.8)
RBC # FLD: 12 % — SIGNIFICANT CHANGE UP (ref 10.3–14.5)
RBC CASTS # UR COMP ASSIST: ABNORMAL /HPF
SARS-COV-2 RNA SPEC QL NAA+PROBE: SIGNIFICANT CHANGE UP
SODIUM SERPL-SCNC: 142 MMOL/L — SIGNIFICANT CHANGE UP (ref 135–145)
SP GR SPEC: 1.01 — SIGNIFICANT CHANGE UP (ref 1–1.03)
TROPONIN T SERPL-MCNC: 0.01 NG/ML — SIGNIFICANT CHANGE UP (ref 0–0.01)
TROPONIN T SERPL-MCNC: <0.01 NG/ML — SIGNIFICANT CHANGE UP (ref 0–0.01)
UROBILINOGEN FLD QL: 0.2 E.U./DL — SIGNIFICANT CHANGE UP
WBC # BLD: 7.8 K/UL — SIGNIFICANT CHANGE UP (ref 3.8–10.5)
WBC # FLD AUTO: 7.8 K/UL — SIGNIFICANT CHANGE UP (ref 3.8–10.5)
WBC UR QL: < 5 /HPF — SIGNIFICANT CHANGE UP

## 2021-02-16 PROCEDURE — 71046 X-RAY EXAM CHEST 2 VIEWS: CPT | Mod: 26

## 2021-02-16 PROCEDURE — 80061 LIPID PANEL: CPT

## 2021-02-16 PROCEDURE — 80053 COMPREHEN METABOLIC PANEL: CPT

## 2021-02-16 PROCEDURE — 75574 CT ANGIO HRT W/3D IMAGE: CPT | Mod: 26,MA

## 2021-02-16 PROCEDURE — 71046 X-RAY EXAM CHEST 2 VIEWS: CPT

## 2021-02-16 PROCEDURE — 82553 CREATINE MB FRACTION: CPT

## 2021-02-16 PROCEDURE — 85730 THROMBOPLASTIN TIME PARTIAL: CPT

## 2021-02-16 PROCEDURE — 83036 HEMOGLOBIN GLYCOSYLATED A1C: CPT

## 2021-02-16 PROCEDURE — 85610 PROTHROMBIN TIME: CPT

## 2021-02-16 PROCEDURE — 82550 ASSAY OF CK (CPK): CPT

## 2021-02-16 PROCEDURE — 81001 URINALYSIS AUTO W/SCOPE: CPT

## 2021-02-16 PROCEDURE — 99284 EMERGENCY DEPT VISIT MOD MDM: CPT | Mod: 25

## 2021-02-16 PROCEDURE — 99285 EMERGENCY DEPT VISIT HI MDM: CPT | Mod: 25

## 2021-02-16 PROCEDURE — U0003: CPT

## 2021-02-16 PROCEDURE — 75574 CT ANGIO HRT W/3D IMAGE: CPT

## 2021-02-16 PROCEDURE — 83880 ASSAY OF NATRIURETIC PEPTIDE: CPT

## 2021-02-16 PROCEDURE — 36415 COLL VENOUS BLD VENIPUNCTURE: CPT

## 2021-02-16 PROCEDURE — 85025 COMPLETE CBC W/AUTO DIFF WBC: CPT

## 2021-02-16 PROCEDURE — U0005: CPT

## 2021-02-16 PROCEDURE — 84484 ASSAY OF TROPONIN QUANT: CPT

## 2021-02-16 RX ORDER — NITROGLYCERIN 6.5 MG
0.4 CAPSULE, EXTENDED RELEASE ORAL ONCE
Refills: 0 | Status: COMPLETED | OUTPATIENT
Start: 2021-02-16 | End: 2021-02-16

## 2021-02-16 RX ORDER — ASPIRIN/CALCIUM CARB/MAGNESIUM 324 MG
325 TABLET ORAL ONCE
Refills: 0 | Status: COMPLETED | OUTPATIENT
Start: 2021-02-16 | End: 2021-02-16

## 2021-02-16 RX ORDER — SODIUM CHLORIDE 9 MG/ML
1000 INJECTION INTRAMUSCULAR; INTRAVENOUS; SUBCUTANEOUS ONCE
Refills: 0 | Status: COMPLETED | OUTPATIENT
Start: 2021-02-16 | End: 2021-02-16

## 2021-02-16 RX ADMIN — SODIUM CHLORIDE 1000 MILLILITER(S): 9 INJECTION INTRAMUSCULAR; INTRAVENOUS; SUBCUTANEOUS at 09:14

## 2021-02-16 RX ADMIN — Medication 0.4 MILLIGRAM(S): at 07:28

## 2021-02-16 RX ADMIN — Medication 325 MILLIGRAM(S): at 07:28

## 2021-02-16 NOTE — ED PROVIDER NOTE - CLINICAL SUMMARY MEDICAL DECISION MAKING FREE TEXT BOX
46 yo m with pmh of gout, pul HTN, nephritic syndrome in Aug 2020, c/o L sided chest pain since yesterday afternoon. Pain is constant, described as pressure, non-radiating. Denies fever, chills, cough, sob, palpitations, sweats, n/v, LE swelling. last stress 2019 was normal. VSS. Exam unremarkable. r/o acs

## 2021-02-16 NOTE — ED PROVIDER NOTE - CARE PROVIDER_API CALL
Chente Santana)  Cardiovascular Disease; Internal Medicine  Cardiology [facility], [address] [place] [address]  Phone: [phone]  Fax: [phone]  Follow Up Time:     Taisha Acosta)  Cardiovascular Disease; Internal Medicine  [address] [place] [address], [address], [address]rd [place] [address]  Phone: [phone]  Fax: [phone]  Follow Up Time:

## 2021-02-16 NOTE — ED PROVIDER NOTE - CARE PROVIDERS DIRECT ADDRESSES
[email or web address]

## 2021-02-16 NOTE — ED PROVIDER NOTE - NSCAREINITIATED _GEN_ER
Kia Restrepo(PA) Admit to NICU  VS monitoring  BCPAP 5, 21%  Wean as tolerated  CXR, CBG, CBC with differential, Type on admission  Consider formula feedings if weans off CPAP

## 2021-02-16 NOTE — ED ADULT NURSE NOTE - OBJECTIVE STATEMENT
44yo male co L sided chest pain without radiation, described as "someone sitting on my chest" that began yesterday evening, relieved later in the evening, and returned this morning. Reports pain is present at rest. Denies SOB, fevers/chills, N/V/D, headache, dizziness. Endorses hx of gout and htn.

## 2021-02-16 NOTE — ED PROVIDER NOTE - PATIENT PORTAL LINK FT
You can access the FollowMyHealth Patient Portal offered by [facility] by registering at the following website: [email or web address]. By joining [employer]’s FollowMyHealth portal, you will also be able to view your health information using other applications (apps) compatible with our system.

## 2021-02-16 NOTE — ED PROVIDER NOTE - ATTENDING CONTRIBUTION TO CARE
CP since yesterday. No other systemic symptoms. Vitals wnl, exam as above. Very well appearing.  In ED: CK ~1600, other labs grossly wnl. trop and repeat trop wnl. UA small blood w/ RBCs. Given IVF. Cr improved from prior.   Initially recommended admission for further cardiac w/u, pt however refusing. CT coronaries w/ normal calcium score/coronaries.   given asa, nitro, IVF.   pt asymptomatic.   Clinically no indication for further emergent ED workup or hospitalization at this time. Comfortable for dc, outpt f/u.   clinically not ACS, PE, tamponade, dissection, PTX, perf, myocarditis, mediastinitis.

## 2021-02-24 ENCOUNTER — LABORATORY RESULT (OUTPATIENT)
Age: 46
End: 2021-02-24

## 2021-02-24 ENCOUNTER — APPOINTMENT (OUTPATIENT)
Dept: NEPHROLOGY | Facility: CLINIC | Age: 46
End: 2021-02-24
Payer: COMMERCIAL

## 2021-02-24 VITALS — BODY MASS INDEX: 32.74 KG/M2 | OXYGEN SATURATION: 98 % | TEMPERATURE: 98.4 F | HEART RATE: 91 BPM | WEIGHT: 255 LBS

## 2021-02-24 VITALS — DIASTOLIC BLOOD PRESSURE: 82 MMHG | SYSTOLIC BLOOD PRESSURE: 130 MMHG | HEART RATE: 72 BPM

## 2021-02-24 VITALS — DIASTOLIC BLOOD PRESSURE: 84 MMHG | SYSTOLIC BLOOD PRESSURE: 124 MMHG | HEART RATE: 72 BPM

## 2021-02-24 VITALS — DIASTOLIC BLOOD PRESSURE: 84 MMHG | SYSTOLIC BLOOD PRESSURE: 130 MMHG

## 2021-02-24 DIAGNOSIS — R07.89 OTHER CHEST PAIN: ICD-10-CM

## 2021-02-24 PROCEDURE — 99215 OFFICE O/P EST HI 40 MIN: CPT | Mod: 25

## 2021-02-24 PROCEDURE — 36415 COLL VENOUS BLD VENIPUNCTURE: CPT

## 2021-02-24 PROCEDURE — 99072 ADDL SUPL MATRL&STAF TM PHE: CPT

## 2021-02-24 NOTE — ASSESSMENT
[FreeTextEntry1] : Plan:\par 1) HTN: BP acceptable today on current regimen. Will therefore not adjust patient's antihypertensive medications at this time but will reassess pressure and regimen at next evaluation.\par 2) CRI: last creatinine 1.32, eGFR of 64 is stable for patient; will continue to monitor function with CMP due to risk for progression of renal failure; have evaluated patient's renal function, blood pressure, and fluid volume status which do not necessitate changes to medications at this time and will thus maintain current therapy. Will attempt to get data from  ER regarding last week's labs.\par 3) Back / neck / chest pain: Referred patient Dr. Antelmo Mccoy for pain management evaluation.\par 4) Foot edema: Advised patient that his current level of edema is not acutely worrisome, though that amlodipine and standing for long periods of time may cause LE edema.\par 5) Gout: Patient has not had recent gout attack on his current regimen of allopurinol per Dr. Stevens, with whom the patient will continue to follow.\par 6) Patient states he has received both doses of a COVID vaccine.\par \par No changes to medications.\par \par Labs were drawn and patient will return in mid- to late March for a follow-up appointment.

## 2021-02-24 NOTE — PHYSICAL EXAM
[General Appearance - Alert] : alert [General Appearance - In No Acute Distress] : in no acute distress [Sclera] : the sclera and conjunctiva were normal [PERRL With Normal Accommodation] : pupils were equal in size, round, and reactive to light [Extraocular Movements] : extraocular movements were intact [Outer Ear] : the ears and nose were normal in appearance [Hearing Threshold Finger Rub Not [place]] : hearing was normal [Examination Of The Oral Cavity] : the lips and gums were normal [Neck Appearance] : the appearance of the neck was normal [Neck Cervical Mass (___cm)] : no neck mass was observed [Jugular Venous Distention Increased] : there was no jugular-venous distention [Thyroid Diffuse Enlargement] : the thyroid was not enlarged [Thyroid Nodule] : there were no palpable thyroid nodules [Auscultation Breath Sounds / Voice Sounds] : lungs were clear to auscultation bilaterally [Heart Rate And Rhythm] : heart rate was normal and rhythm regular [Heart Sounds] : normal S1 and S2 [Heart Sounds Gallop] : no gallops [Murmurs] : no murmurs [Heart Sounds Pericardial Friction Rub] : no pericardial rub [Edema] : there was no peripheral edema [Bowel Sounds] : normal bowel sounds [Abdomen Soft] : soft [Abdomen Tenderness] : non-tender [Abdomen Mass (___ Cm)] : no abdominal mass palpated [No CVA Tenderness] : no ~M costovertebral angle tenderness [No Spinal Tenderness] : no spinal tenderness [Abnormal Walk] : normal gait [Involuntary Movements] : no involuntary movements were seen [Musculoskeletal - Swelling] : no joint swelling seen [Motor Tone] : muscle strength and tone were normal [Skin Color & Pigmentation] : normal skin color and pigmentation [Skin Turgor] : normal skin turgor [] : no rash [No Focal Deficits] : no focal deficits [Oriented To Time, Place, And Person] : oriented to person, place, and time [Impaired Insight] : insight and judgment were intact [Affect] : the affect was normal

## 2021-02-24 NOTE — HISTORY OF PRESENT ILLNESS
[FreeTextEntry1] : The patient is a 45 year old male presenting for f/u evaluation for BP management and active reassessment of gout, cervical radiculopathy with chronic back pain, proteinuria, microhematuria and CRI.\par \par I saw patient in the ER approximately one week ago, when he complained of CP and was being considered for admission. His renal function was satisfactory, and a chest/coronary CT with contrast revealed no coronary disease of concern. Patient was discharged to f/u.\par \par Today, the patient reports intermittent neck, left upper chest, and back pain that is occasionally bothersome,  though denies associated SOB. He is otherwise feeling generally well. Patient states he has previously received an MRI of his left shoulder, though not of his cervical spine.\par - The patient also c/o foot swelling when standing at work for long periods of time.\par - Reviewed most recent labs and imaging in detail with the patient today.\par \par MRI 10/11/19 revealed broad-based left paracentral disc herniation; stable multilevel thoracic spine degenerative changes when compared to MRI from 12/12/18.\par \par Labs taken 11/24/20 reveal: triglycerides 276, , creatinine 1.32, eGFR 64, sed rate 27\par \par Current medications: amlodipine 5mg QD, clonidine patch #3 QW. Patient on allopurinol 50mg QD per Dr. Stevens. \par

## 2021-02-24 NOTE — END OF VISIT
[Time Spent: ___ minutes] : I have spent [unfilled] minutes of time on the encounter. [FreeTextEntry3] : Documented by Dm Garcia acting as a scribe for Dr. Devon Gutierrez on 02/24/2021.

## 2021-02-28 LAB
ALBUMIN SERPL ELPH-MCNC: 4.7 G/DL
ALP BLD-CCNC: 96 U/L
ALT SERPL-CCNC: 44 U/L
ANION GAP SERPL CALC-SCNC: 18 MMOL/L
APPEARANCE: CLEAR
ASO AB SER LA-ACNC: 222 IU/ML
AST SERPL-CCNC: 83 U/L
BACTERIA: NEGATIVE
BASOPHILS # BLD AUTO: 0.04 K/UL
BASOPHILS NFR BLD AUTO: 0.5 %
BILIRUB SERPL-MCNC: 0.5 MG/DL
BILIRUBIN URINE: NEGATIVE
BLOOD URINE: ABNORMAL
BUN SERPL-MCNC: 21 MG/DL
CALCIUM SERPL-MCNC: 10.4 MG/DL
CHLORIDE SERPL-SCNC: 104 MMOL/L
CHOLEST SERPL-MCNC: 191 MG/DL
CO2 SERPL-SCNC: 19 MMOL/L
COLOR: NORMAL
CREAT SERPL-MCNC: 1.61 MG/DL
CREAT SPEC-SCNC: 123 MG/DL
CREAT SPEC-SCNC: 123 MG/DL
CREAT/PROT UR: 0.2 RATIO
CRP SERPL-MCNC: 0.33 MG/DL
EOSINOPHIL # BLD AUTO: 0.11 K/UL
EOSINOPHIL NFR BLD AUTO: 1.4 %
ERYTHROCYTE [SEDIMENTATION RATE] IN BLOOD BY WESTERGREN METHOD: 33 MM/HR
ESTIMATED AVERAGE GLUCOSE: 131 MG/DL
GLUCOSE QUALITATIVE U: NEGATIVE
GLUCOSE SERPL-MCNC: 94 MG/DL
HBA1C MFR BLD HPLC: 6.2 %
HCT VFR BLD CALC: 45.7 %
HDLC SERPL-MCNC: 34 MG/DL
HGB BLD-MCNC: 14.8 G/DL
HYALINE CASTS: 0 /LPF
IMM GRANULOCYTES NFR BLD AUTO: 0.4 %
KETONES URINE: NEGATIVE
LDLC SERPL CALC-MCNC: 111 MG/DL
LEUKOCYTE ESTERASE URINE: NEGATIVE
LYMPHOCYTES # BLD AUTO: 2.69 K/UL
LYMPHOCYTES NFR BLD AUTO: 33.9 %
MAGNESIUM SERPL-MCNC: 2.2 MG/DL
MAN DIFF?: NORMAL
MCHC RBC-ENTMCNC: 29.2 PG
MCHC RBC-ENTMCNC: 32.4 GM/DL
MCV RBC AUTO: 90.1 FL
MICROALBUMIN 24H UR DL<=1MG/L-MCNC: 8.1 MG/DL
MICROALBUMIN/CREAT 24H UR-RTO: 66 MG/G
MICROSCOPIC-UA: NORMAL
MONOCYTES # BLD AUTO: 0.57 K/UL
MONOCYTES NFR BLD AUTO: 7.2 %
NEUTROPHILS # BLD AUTO: 4.49 K/UL
NEUTROPHILS NFR BLD AUTO: 56.6 %
NITRITE URINE: NEGATIVE
NONHDLC SERPL-MCNC: 157 MG/DL
NT-PROBNP SERPL-MCNC: 24 PG/ML
PH URINE: 5
PHOSPHATE SERPL-MCNC: 3.8 MG/DL
PLATELET # BLD AUTO: 261 K/UL
POTASSIUM SERPL-SCNC: 4.4 MMOL/L
PROT SERPL-MCNC: 8.3 G/DL
PROT UR-MCNC: 28 MG/DL
PROTEIN URINE: NORMAL
RBC # BLD: 5.07 M/UL
RBC # FLD: 12.7 %
RED BLOOD CELLS URINE: 0 /HPF
SODIUM SERPL-SCNC: 141 MMOL/L
SPECIFIC GRAVITY URINE: 1.02
SQUAMOUS EPITHELIAL CELLS: 0 /HPF
T3FREE SERPL-MCNC: 3.19 PG/ML
T3RU NFR SERPL: 1 TBI
T4 FREE SERPL-MCNC: 1.2 NG/DL
T4 SERPL-MCNC: 5.7 UG/DL
THYROGLOB AB SERPL-ACNC: <20 IU/ML
THYROPEROXIDASE AB SERPL IA-ACNC: <10 IU/ML
TRIGL SERPL-MCNC: 229 MG/DL
TSH SERPL-ACNC: 1.63 UIU/ML
URATE SERPL-MCNC: 10.6 MG/DL
UROBILINOGEN URINE: NORMAL
WBC # FLD AUTO: 7.93 K/UL
WHITE BLOOD CELLS URINE: 0 /HPF

## 2021-03-24 ENCOUNTER — APPOINTMENT (OUTPATIENT)
Dept: NEPHROLOGY | Facility: CLINIC | Age: 46
End: 2021-03-24
Payer: COMMERCIAL

## 2021-03-24 ENCOUNTER — LABORATORY RESULT (OUTPATIENT)
Age: 46
End: 2021-03-24

## 2021-03-24 VITALS — TEMPERATURE: 98.3 F | OXYGEN SATURATION: 97 % | WEIGHT: 255 LBS | HEART RATE: 83 BPM | BODY MASS INDEX: 32.74 KG/M2

## 2021-03-24 VITALS — SYSTOLIC BLOOD PRESSURE: 124 MMHG | DIASTOLIC BLOOD PRESSURE: 74 MMHG | HEART RATE: 72 BPM

## 2021-03-24 VITALS — DIASTOLIC BLOOD PRESSURE: 74 MMHG | SYSTOLIC BLOOD PRESSURE: 128 MMHG

## 2021-03-24 VITALS — HEART RATE: 72 BPM | SYSTOLIC BLOOD PRESSURE: 128 MMHG | DIASTOLIC BLOOD PRESSURE: 80 MMHG

## 2021-03-24 DIAGNOSIS — E87.70 FLUID OVERLOAD, UNSPECIFIED: ICD-10-CM

## 2021-03-24 PROCEDURE — 99072 ADDL SUPL MATRL&STAF TM PHE: CPT

## 2021-03-24 PROCEDURE — 99214 OFFICE O/P EST MOD 30 MIN: CPT | Mod: 25

## 2021-03-24 PROCEDURE — 36415 COLL VENOUS BLD VENIPUNCTURE: CPT

## 2021-03-24 NOTE — ADDENDUM
[FreeTextEntry1] : All medical record entries made by the Scribe were at my, Dr. Devon Gutierrez, direction and personally dictated by me on 03/24/2021. I have reviewed the chart and agree that the record accurately reflects my personal performance of the history, physical exam, assessment and plan. I have also personally directed, reviewed, and agreed with the chart.

## 2021-03-24 NOTE — ASSESSMENT
[FreeTextEntry1] : Plan:\par 1) HTN: BP well-controlled today on current regimen. Will therefore not adjust patient's antihypertensive medications at this time but will reassess pressure and regimen at next evaluation.\par 2) CRI: last eGFR of 50; will continue to monitor function with CMP due to risk for progression of renal failure; will maintain current therapy at this time.\par 3) Back / neck / chest pain: Patient will be seen in neurology in the coming week for further evaluation.\par 4) Gout: Patient has not had recent gout attack on his current regimen of allopurinol per Dr. Stevens. Patient will continue to f/u with rheumatology.\par 5) Patient has reportedly received both doses of a COVID vaccine.\par 6) Hyperglycemia: Most recent HbA1c of 6.2 is elevated. Advised patient to adhere to recommended diet, including limiting high-sugar foods, and to increase exercise regimen, as tolerated and weather permitting. Will continue to monitor levels with repeat labs. May consider addition of medication if levels do not improve by next visit.\par 7) Last antistreptolysin O level of 222 elevated, though continues overall lowering from high of 1202 in September 2020. Will continue to monitor on labs today.\par \par No changes to medications at this time.\par \par Labs were drawn and patient will return in 4-6 weeks for a follow-up appointment.

## 2021-03-24 NOTE — HISTORY OF PRESENT ILLNESS
[FreeTextEntry1] : The patient is a 46 year old male presenting for f/u evaluation for BP management and active reassessment of gout, cervical radiculopathy with chronic back pain, proteinuria, microhematuria, and CRI. 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 He is tolerating his medications well. He be seen in the [facility] neurology department in the coming week to evaluate thoracic spine pain. He also denies any further gout attacks on allopurinol 50mg QD.\par - Reviewed most recent labs in detail with the patient today.\par \par Labs taken 2/24/21 reveal: antistreptolysin O 222, sed rate 33, HbA1c 6.2, triglycerides 229, HDL 34, , CO2 of 19, creatinine 1.61, AST 83, eGFR 50\par \par Current medications: amlodipine 5mg QD, clonidine patch #3 QW. Patient on allopurinol 50mg QD per Dr. Setvens.\par

## 2021-03-24 NOTE — PHYSICAL EXAM
[General Appearance - Alert] : alert [General Appearance - In No Acute Distress] : in no acute distress [Sclera] : the sclera and conjunctiva were normal [PERRL With Normal Accommodation] : pupils were equal in size, round, and reactive to light [Extraocular Movements] : extraocular movements were intact [Outer Ear] : the ears and nose were normal in appearance [Hearing Threshold Finger Rub Not [place]] : hearing was normal [Examination Of The Oral Cavity] : the lips and gums were normal [Neck Appearance] : the appearance of the neck was normal [Neck Cervical Mass (___cm)] : no neck mass was observed [Jugular Venous Distention Increased] : there was no jugular-venous distention [Thyroid Diffuse Enlargement] : the thyroid was not enlarged [Thyroid Nodule] : there were no palpable thyroid nodules [Auscultation Breath Sounds / Voice Sounds] : lungs were clear to auscultation bilaterally [Heart Rate And Rhythm] : heart rate was normal and rhythm regular [Heart Sounds] : normal S1 and S2 [Heart Sounds Gallop] : no gallops [Murmurs] : no murmurs [Heart Sounds Pericardial Friction Rub] : no pericardial rub [Edema] : there was no peripheral edema [Bowel Sounds] : normal bowel sounds [Abdomen Soft] : soft [Abdomen Tenderness] : non-tender [Abdomen Mass (___ Cm)] : no abdominal mass palpated [No CVA Tenderness] : no ~M costovertebral angle tenderness [No Spinal Tenderness] : no spinal tenderness [Abnormal Walk] : normal gait [Involuntary Movements] : no involuntary movements were seen [Musculoskeletal - Swelling] : no joint swelling seen [Motor Tone] : muscle strength and tone were normal [Skin Color & Pigmentation] : normal skin color and pigmentation [Skin Turgor] : normal skin turgor [] : no rash [No Focal Deficits] : no focal deficits [Oriented To Time, Place, And Person] : oriented to person, place, and time [Impaired Insight] : insight and judgment were intact [Affect] : the affect was normal

## 2021-03-24 NOTE — END OF VISIT
[Time Spent: ___ minutes] : I have spent [unfilled] minutes of time on the encounter. [FreeTextEntry3] : Documented by Dm Garcia acting as a scribe for Dr. Devon Gutierrez on 03/24/2021.

## 2021-03-25 ENCOUNTER — RESULT REVIEW (OUTPATIENT)
Age: 46
End: 2021-03-25

## 2021-03-25 ENCOUNTER — APPOINTMENT (OUTPATIENT)
Dept: SPINE | Facility: CLINIC | Age: 46
End: 2021-03-25
Payer: COMMERCIAL

## 2021-03-25 ENCOUNTER — OUTPATIENT (OUTPATIENT)
Dept: OUTPATIENT SERVICES | Facility: HOSPITAL | Age: 46
LOS: 1 days | End: 2021-03-25
Payer: OTHER MISCELLANEOUS

## 2021-03-25 VITALS
TEMPERATURE: 99.6 F | BODY MASS INDEX: 33.37 KG/M2 | DIASTOLIC BLOOD PRESSURE: 80 MMHG | HEART RATE: 104 BPM | RESPIRATION RATE: 20 BRPM | SYSTOLIC BLOOD PRESSURE: 130 MMHG | HEIGHT: 74 IN | WEIGHT: 260 LBS | OXYGEN SATURATION: 97 %

## 2021-03-25 DIAGNOSIS — Z78.9 OTHER SPECIFIED HEALTH STATUS: ICD-10-CM

## 2021-03-25 DIAGNOSIS — Z87.39 PERSONAL HISTORY OF OTHER DISEASES OF THE MUSCULOSKELETAL SYSTEM AND CONNECTIVE TISSUE: ICD-10-CM

## 2021-03-25 DIAGNOSIS — G89.29 CERVICALGIA: ICD-10-CM

## 2021-03-25 DIAGNOSIS — M54.2 CERVICALGIA: ICD-10-CM

## 2021-03-25 PROCEDURE — 72082 X-RAY EXAM ENTIRE SPI 2/3 VW: CPT | Mod: 26

## 2021-03-25 PROCEDURE — 72082 X-RAY EXAM ENTIRE SPI 2/3 VW: CPT

## 2021-03-25 PROCEDURE — 99072 ADDL SUPL MATRL&STAF TM PHE: CPT

## 2021-03-25 PROCEDURE — 99205 OFFICE O/P NEW HI 60 MIN: CPT

## 2021-03-26 PROBLEM — Z87.39 HISTORY OF GOUT: Status: RESOLVED | Noted: 2021-03-26 | Resolved: 2021-03-26

## 2021-03-26 PROBLEM — Z78.9 CURRENT NON-SMOKER: Status: ACTIVE | Noted: 2021-03-26

## 2021-03-26 NOTE — REASON FOR VISIT
[Workers' Comp: Date of Injury: _______] : This visit is related to worker's compensation. Date of Injury: [unfilled] [Back Pain] : back pain

## 2021-03-26 NOTE — PHYSICAL EXAM
[Normal] : Gait: normal [] : Motor: [UE Motor Strength NL] : Motor strength of the bilateral upper extremities is normal [LE Motor Strength NL] : Motor strength of the bilateral lower extremities was normal [2+] : left brachioradialis 2+ [1+] : left ankle jerk 1+

## 2021-03-30 ENCOUNTER — EMERGENCY (EMERGENCY)
Facility: HOSPITAL | Age: 46
LOS: 1 days | Discharge: HOME | End: 2021-03-30
Admitting: EMERGENCY MEDICINE
Payer: COMMERCIAL

## 2021-03-30 VITALS
HEART RATE: 78 BPM | HEIGHT: 74 IN | DIASTOLIC BLOOD PRESSURE: 80 MMHG | OXYGEN SATURATION: 100 % | RESPIRATION RATE: 18 BRPM | SYSTOLIC BLOOD PRESSURE: 132 MMHG | TEMPERATURE: 98 F

## 2021-03-30 DIAGNOSIS — Z79.52 LONG TERM (CURRENT) USE OF SYSTEMIC STEROIDS: ICD-10-CM

## 2021-03-30 DIAGNOSIS — Z20.822 CONTACT WITH AND (SUSPECTED) EXPOSURE TO COVID-19: ICD-10-CM

## 2021-03-30 DIAGNOSIS — Z79.899 OTHER LONG TERM (CURRENT) DRUG THERAPY: ICD-10-CM

## 2021-03-30 DIAGNOSIS — Z88.8 ALLERGY STATUS TO OTHER DRUGS, MEDICAMENTS AND BIOLOGICAL SUBSTANCES: ICD-10-CM

## 2021-03-30 PROBLEM — M54.2 NECK PAIN, CHRONIC: Status: ACTIVE | Noted: 2021-03-30

## 2021-03-30 LAB — SARS-COV-2 RNA SPEC QL NAA+PROBE: SIGNIFICANT CHANGE UP

## 2021-03-30 PROCEDURE — 99282 EMERGENCY DEPT VISIT SF MDM: CPT

## 2021-03-30 PROCEDURE — 87635 SARS-COV-2 COVID-19 AMP PRB: CPT

## 2021-03-30 PROCEDURE — 99283 EMERGENCY DEPT VISIT LOW MDM: CPT

## 2021-03-30 NOTE — ED PROVIDER NOTE - OBJECTIVE STATEMENT
Patient is presenting to the Emergency Department with a request to have COVID-19 testing.  states flu like symtpoms. Denies symptoms, including cough, shortness of breath, fever, chills, bodyaches or sore throat.

## 2021-03-30 NOTE — ED ADULT NURSE NOTE - NSIMPLEMENTINTERV_GEN_ALL_ED
Implemented All Universal Safety Interventions:  [place] to call system. Call bell, personal items and telephone within reach. Instruct patient to call for assistance. Room bathroom lighting operational. Non-slip footwear when patient is off stretcher. Physically safe environment: no spills, clutter or unnecessary equipment. Stretcher in lowest position, wheels locked, appropriate side rails in place.

## 2021-03-30 NOTE — ED ADULT TRIAGE NOTE - ARRIVAL INFO ADDITIONAL COMMENTS
pt told by employee health to come for a covid swab because he has had a cough and weakness.   pt is fully vaccinated.

## 2021-03-30 NOTE — HISTORY OF PRESENT ILLNESS
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 Pain is described as intermittent moderate to severe sharp pain on his Right side mid back radiating to anterior chest. Pain is worsening by walking/long standing and alleviated by ice/PT (last therapy was in 4/2020). He also c/o intermittent b/l hands and LE numbness for several years since he was diagnosed gout but denies any new worsening symptoms since his injury. He denies BB dysfunction, upper/lower extremities weakness, balance problem, saddle anesthesia. \par As per patient, he was previously treated with ESIs (2 times with moderate relief), nerve block (one time with no relief) and recently seen new pain management (ANNA Ahmadi @ Dr. Antelmo Mccoy's practice) who recommends ADRIANNA trials given failed conservative management of 6 weeks trial of medications/PT. As per patient, He is not taking any meds or treatment for his back pain at this time and has not been considered ESIs. \par He was seen in [facility] ED in Feb 2020 for L side chest pain, cardiac work up was completed and plan was made to f/u with cardiology\par \par h/o hospital admission ([facility]) in 8/2020 for chest pain found to have nephritic syndrome, recently seen in [facility] ED for L chest pain (cardiac w/u unremarkable other than elevated CK) and he was d/c to home with cardiology follow up plan. \par He ambulates without assistive device and able to perform ADLs independently.

## 2021-03-30 NOTE — ASSESSMENT
[FreeTextEntry1] : PLAN\par - normal neuro exam\par - will do Xray C/T/L spine (no further images will be recommended if Xray is normal)\par - continue to f/u pain management as scheduled \par - f/u with PCP/cardiology/nephrology

## 2021-03-30 NOTE — ED PROVIDER NOTE - PATIENT PORTAL LINK FT
You can access the FollowMyHealth Patient Portal offered by [facility] by registering at the following website: [email or web address]. By joining [employer]’s FollowMyHealth portal, you will also be able to view your health information using other applications (apps) compatible with our system.

## 2021-03-30 NOTE — ED PROVIDER NOTE - CLINICAL SUMMARY MEDICAL DECISION MAKING FREE TEXT BOX
Patient is presenting to the Emergency Department and requesting a COVID-19 test. states flu like symptoms. Patient denies any symptoms, has an unremarkable focused exam and looks well.  Nasopharyngeal PCR has been obtained and patient has been guided on how to obtain their results.  General COVID-19 discharge instructions have been given to the patient.

## 2021-04-02 LAB
24R-OH-CALCIDIOL SERPL-MCNC: 37.8 PG/ML
25(OH)D3 SERPL-MCNC: 21.4 NG/ML
ALBUMIN MFR SERPL ELPH: 55.3 %
ALBUMIN SERPL ELPH-MCNC: 4.6 G/DL
ALBUMIN SERPL-MCNC: 4.4 G/DL
ALBUMIN/GLOB SERPL: 1.2 RATIO
ALDOSTERONE SERUM: 24.7 NG/DL
ALP BLD-CCNC: 81 U/L
ALP BONE SERPL-MCNC: 27.2 UG/L
ALPHA1 GLOB MFR SERPL ELPH: 3.1 %
ALPHA1 GLOB SERPL ELPH-MCNC: 0.2 G/DL
ALPHA2 GLOB MFR SERPL ELPH: 7.6 %
ALPHA2 GLOB SERPL ELPH-MCNC: 0.6 G/DL
ALT SERPL-CCNC: 51 U/L
ANA SER IF-ACNC: NEGATIVE
ANION GAP SERPL CALC-SCNC: 13 MMOL/L
APPEARANCE: CLEAR
ASO AB SER LA-ACNC: 181 IU/ML
AST SERPL-CCNC: 75 U/L
B-GLOBULIN MFR SERPL ELPH: 11.2 %
B-GLOBULIN SERPL ELPH-MCNC: 0.9 G/DL
BACTERIA: NEGATIVE
BASOPHILS # BLD AUTO: 0.02 K/UL
BASOPHILS NFR BLD AUTO: 0.3 %
BILIRUB SERPL-MCNC: 0.9 MG/DL
BILIRUBIN URINE: NEGATIVE
BLOOD URINE: NEGATIVE
BUN SERPL-MCNC: 11 MG/DL
C3 SERPL-MCNC: 133 MG/DL
C4 SERPL-MCNC: 22 MG/DL
CALCIUM SERPL-MCNC: 9.8 MG/DL
CALCIUM SERPL-MCNC: 9.8 MG/DL
CHLORIDE SERPL-SCNC: 105 MMOL/L
CHOLEST SERPL-MCNC: 179 MG/DL
CO2 SERPL-SCNC: 24 MMOL/L
COLLAGEN CTX SERPL-MCNC: 335 PG/ML
COLOR: NORMAL
CREAT SERPL-MCNC: 1.18 MG/DL
CREAT SPEC-SCNC: 129 MG/DL
CREAT/PROT UR: 0.1 RATIO
CYSTATIN C SERPL-MCNC: 1.15 MG/L
DEPRECATED KAPPA LC FREE/LAMBDA SER: 2.36 RATIO
EOSINOPHIL # BLD AUTO: 0.1 K/UL
EOSINOPHIL NFR BLD AUTO: 1.3 %
ESTIMATED AVERAGE GLUCOSE: 123 MG/DL
FERRITIN SERPL-MCNC: 82 NG/ML
FOLATE SERPL-MCNC: >20 NG/ML
GAMMA GLOB FLD ELPH-MCNC: 1.8 G/DL
GAMMA GLOB MFR SERPL ELPH: 22.8 %
GFR/BSA.PRED SERPLBLD CYS-BASED-ARV: 68 ML/MIN
GLUCOSE QUALITATIVE U: NEGATIVE
GLUCOSE SERPL-MCNC: 114 MG/DL
HBA1C MFR BLD HPLC: 5.9 %
HBV SURFACE AB SER QL: NONREACTIVE
HBV SURFACE AG SER QL: NONREACTIVE
HCT VFR BLD CALC: 44.6 %
HCV AB SER QL: NONREACTIVE
HCV S/CO RATIO: 0.18 S/CO
HCYS SERPL-MCNC: 16.9 UMOL/L
HDLC SERPL-MCNC: 34 MG/DL
HGB BLD-MCNC: 14.5 G/DL
HYALINE CASTS: 0 /LPF
IGA SER QL IEP: 203 MG/DL
IGG SER QL IEP: 1972 MG/DL
IGM SER QL IEP: 92 MG/DL
IMM GRANULOCYTES NFR BLD AUTO: 0.3 %
INTERPRETATION SERPL IEP-IMP: NORMAL
IRON SATN MFR SERPL: 25 %
IRON SERPL-MCNC: 82 UG/DL
KAPPA LC CSF-MCNC: 2.28 MG/DL
KAPPA LC SERPL-MCNC: 5.37 MG/DL
KETONES URINE: NEGATIVE
LDLC SERPL CALC-MCNC: 89 MG/DL
LEUKOCYTE ESTERASE URINE: NEGATIVE
LYMPHOCYTES # BLD AUTO: 2.4 K/UL
LYMPHOCYTES NFR BLD AUTO: 31.7 %
M PROTEIN SPEC IFE-MCNC: NORMAL
MAGNESIUM SERPL-MCNC: 2.3 MG/DL
MAN DIFF?: NORMAL
MCHC RBC-ENTMCNC: 29.1 PG
MCHC RBC-ENTMCNC: 32.5 GM/DL
MCV RBC AUTO: 89.6 FL
METHYLMALONATE SERPL-SCNC: 226 NMOL/L
MICROSCOPIC-UA: NORMAL
MONOCYTES # BLD AUTO: 0.75 K/UL
MONOCYTES NFR BLD AUTO: 9.9 %
MPO AB + PR3 PNL SER: NORMAL
NEUTROPHILS # BLD AUTO: 4.27 K/UL
NEUTROPHILS NFR BLD AUTO: 56.5 %
NITRITE URINE: NEGATIVE
NONHDLC SERPL-MCNC: 146 MG/DL
PARATHYROID HORMONE INTACT: 28 PG/ML
PH URINE: 5.5
PHOSPHATE SERPL-MCNC: 3.7 MG/DL
PLATELET # BLD AUTO: 242 K/UL
POTASSIUM SERPL-SCNC: 4.1 MMOL/L
PROT SERPL-MCNC: 8 G/DL
PROT SERPL-MCNC: 8 G/DL
PROT UR-MCNC: 14 MG/DL
PROTEIN URINE: NEGATIVE
RBC # BLD: 4.98 M/UL
RBC # FLD: 13.3 %
RED BLOOD CELLS URINE: 1 /HPF
RENIN PLASMA: 14.5 PG/ML
RHEUMATOID FACT SER QL: <10 IU/ML
SODIUM SERPL-SCNC: 142 MMOL/L
SPECIFIC GRAVITY URINE: 1.02
SQUAMOUS EPITHELIAL CELLS: 0 /HPF
T3FREE SERPL-MCNC: 3.35 PG/ML
T3RU NFR SERPL: 0.9 TBI
T4 FREE SERPL-MCNC: 1.2 NG/DL
T4 SERPL-MCNC: 5.5 UG/DL
THYROGLOB AB SERPL-ACNC: <20 IU/ML
THYROPEROXIDASE AB SERPL IA-ACNC: <10 IU/ML
TIBC SERPL-MCNC: 324 UG/DL
TRIGL SERPL-MCNC: 283 MG/DL
TSH SERPL-ACNC: 0.89 UIU/ML
UIBC SERPL-MCNC: 242 UG/DL
URATE SERPL-MCNC: 8.8 MG/DL
UROBILINOGEN URINE: NORMAL
VIT B12 SERPL-MCNC: 603 PG/ML
WBC # FLD AUTO: 7.56 K/UL
WHITE BLOOD CELLS URINE: 0 /HPF

## 2021-04-06 ENCOUNTER — OUTPATIENT (OUTPATIENT)
Dept: OUTPATIENT SERVICES | Facility: HOSPITAL | Age: 46
LOS: 1 days | End: 2021-04-06
Payer: OTHER MISCELLANEOUS

## 2021-04-06 PROCEDURE — 72052 X-RAY EXAM NECK SPINE 6/>VWS: CPT | Mod: 26

## 2021-04-06 PROCEDURE — 72114 X-RAY EXAM L-S SPINE BENDING: CPT | Mod: 26

## 2021-04-06 PROCEDURE — 72114 X-RAY EXAM L-S SPINE BENDING: CPT

## 2021-04-06 PROCEDURE — 72052 X-RAY EXAM NECK SPINE 6/>VWS: CPT

## 2021-04-25 ENCOUNTER — EMERGENCY (EMERGENCY)
Facility: HOSPITAL | Age: 46
LOS: 1 days | Discharge: HOME | End: 2021-04-25
Admitting: EMERGENCY MEDICINE
Payer: COMMERCIAL

## 2021-04-25 VITALS
HEIGHT: 74 IN | SYSTOLIC BLOOD PRESSURE: 131 MMHG | HEART RATE: 92 BPM | OXYGEN SATURATION: 95 % | WEIGHT: 255.07 LBS | TEMPERATURE: 99 F | RESPIRATION RATE: 16 BRPM | DIASTOLIC BLOOD PRESSURE: 75 MMHG
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
## 2021-04-25 DIAGNOSIS — Z88.9 ALLERGY STATUS TO UNSPECIFIED DRUGS, MEDICAMENTS AND BIOLOGICAL SUBSTANCES: ICD-10-CM

## 2021-04-25 DIAGNOSIS — Z79.899 OTHER LONG TERM (CURRENT) DRUG THERAPY: ICD-10-CM

## 2021-04-25 DIAGNOSIS — Z82.49 FAMILY HISTORY OF ISCHEMIC HEART DISEASE AND OTHER DISEASES OF THE CIRCULATORY SYSTEM: ICD-10-CM

## 2021-04-25 DIAGNOSIS — I10 ESSENTIAL (PRIMARY) HYPERTENSION: ICD-10-CM
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
## 2021-04-25 DIAGNOSIS — M25.521 PAIN IN RIGHT ELBOW: ICD-10-CM
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
## 2021-04-25 DIAGNOSIS — Z83.3 FAMILY HISTORY OF DIABETES MELLITUS: ICD-10-CM

## 2021-04-25 DIAGNOSIS — M10.9 GOUT, UNSPECIFIED: ICD-10-CM

## 2021-04-25 PROCEDURE — 73080 X-RAY EXAM OF ELBOW: CPT

## 2021-04-25 PROCEDURE — 99283 EMERGENCY DEPT VISIT LOW MDM: CPT

## 2021-04-25 PROCEDURE — 73080 X-RAY EXAM OF ELBOW: CPT | Mod: 26,RT

## 2021-04-25 RX ORDER — IBUPROFEN 200 MG
1 TABLET ORAL
Qty: 30 | Refills: 0
Start: 2021-04-25

## 2021-04-25 RX ORDER — IBUPROFEN 200 MG
600 TABLET ORAL ONCE
Refills: 0 | Status: COMPLETED | OUTPATIENT
Start: 2021-04-25 | End: 2021-04-25

## 2021-04-25 RX ADMIN — Medication 600 MILLIGRAM(S): at 10:34

## 2021-04-25 NOTE — ED PROVIDER NOTE - CLINICAL SUMMARY MEDICAL DECISION MAKING FREE TEXT BOX
45 y/o m presents c/o pain to right elbow after hitting it at work; ext NVI, xray neg for fracture, has posterior soft tissue swelling consistent with bursitis.  ACE wrap applied in ED, recommend NSAIDs, RICE, f/u ortho

## 2021-04-25 NOTE — ED PROVIDER NOTE - PATIENT PORTAL LINK FT
You can access the FollowMyHealth Patient Portal offered by [facility] by registering at the following website: [email or web address]. By joining [employer]’s FollowMyHealth portal, you will also be able to view your health information using other applications (apps) compatible with our system.

## 2021-04-25 NOTE — ED ADULT TRIAGE NOTE - NS ED TRIAGE AVPU SCALE
-Resolved   Alert-The patient is alert, awake and responds to voice. The patient is oriented to time, place, and person. The triage nurse is able to obtain subjective information.

## 2021-04-25 NOTE — ED PROVIDER NOTE - OBJECTIVE STATEMENT
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  Pt stating area is swollen and painful for the past 2 days, hasn't taken anything for pain.  Denies numbness/tingling to ext, weakness, all other ROS negative.

## 2021-04-25 NOTE — ED PROVIDER NOTE - MUSCULOSKELETAL, MLM
right elbow +swelling posteriorly, no deformity, limited ROM 2/2 pain, skin intact, no erythema, radial pulse 2+, strength 5/5, sensation intact distally

## 2021-04-25 NOTE — ED ADULT NURSE NOTE - NSIMPLEMENTINTERV_GEN_ALL_ED
Implemented All Universal Safety Interventions:  [place] to call system. Call bell, personal items and telephone within reach. Instruct patient to call for assistance. Room bathroom lighting operational. Non-slip footwear when patient is off stretcher. Physically safe environment: no spills, clutter or unnecessary equipment. Stretcher in lowest position, wheels locked, appropriate side rails in place.

## 2021-04-25 NOTE — ED ADULT TRIAGE NOTE - CHIEF COMPLAINT QUOTE
"I hit my elbow while I was cleaning out the kettle yesterday." Pt has pain, swelling to right elbow.

## 2021-04-25 NOTE — ED PROVIDER NOTE - NSFOLLOWUPINSTRUCTIONS_ED_ALL_ED_FT
Elbow Bursitis    WHAT YOU NEED TO KNOW:    Elbow bursitis is inflammation of the bursa in your elbow. The bursa is a fluid-filled sac that acts as a cushion between a bone and a tendon. A tendon is a cord of strong tissue that connects muscles to bones. The bursa is located right under the point of your elbow.    Adult Arm Bones         DISCHARGE INSTRUCTIONS:    Call your doctor if:   •Your pain and swelling increase.      •Your symptoms do not improve after 10 days of treatment.      •You have a fever.      •You have questions or concerns about your condition or care.      Medicines: You may need any of the following:  •NSAIDs, such as ibuprofen, help decrease swelling, pain, and fever. NSAIDs can cause stomach bleeding or kidney problems in certain people. If you take blood thinner medicine, always ask your healthcare provider if NSAIDs are safe for you. Always read the medicine label and follow directions.      •Aspirin helps relieve pain and swelling. Take aspirin exactly as directed by your healthcare provider.      •Antibiotics help fight an infection caused by bacteria.      •Steroids help decrease pain and swelling. Steroids may be given for a short time to relieve acute pain.      •Take your medicine as directed. Contact your healthcare provider if you think your medicine is not helping or if you have side effects. Tell him of her if you are allergic to any medicine. Keep a list of the medicines, vitamins, and herbs you take. Include the amounts, and when and why you take them. Bring the list or the pill bottles to follow-up visits. Carry your medicine list with you in case of an emergency.      Manage your symptoms:   •Rest your elbow as much as possible to decrease pain and swelling. Slowly start to do more each day. Return to your daily activities as directed.      •Apply ice to help decrease swelling and pain. Use an ice pack, or put crushed ice in a plastic bag. Cover the bag with a towel before you place it on your elbow. Apply ice for 15 to 20 minutes, 3 to 4 times each day, as directed.      •Use compression to help decrease swelling. Healthcare providers may wrap your arm with tape or an elastic bandage. Loosen the elastic bandage if you start to lose feeling in your fingers.  How to Wrap an Elastic Bandage           •Elevate (raise) your elbow above the level of your heart as often as you can. This will help decrease swelling and pain. Prop your elbow on pillows or blankets to keep it elevated comfortably.             •Go to physical therapy, if directed. A physical therapist teaches you exercises to help improve movement and strength, and to decrease pain.      Prevent another elbow injury:   •Avoid injury and pressure to your elbows. Wear elbow pads or protectors when you play sports. Do not lean on your elbows or clench your fists. Do not tightly  small items, such as tools or pens.      •Stretch, warm up, and cool down. Always stretch and do warmup and cool-down exercises before and after you exercise. This will help loosen your muscles and decrease stress on your elbow. Rest between workouts.      Follow up with your doctor as directed: Write down your questions so you remember to ask them during your visits.

## 2021-04-28 ENCOUNTER — INPATIENT (INPATIENT)
Facility: HOSPITAL | Age: 46
LOS: 0 days | Discharge: HOME | DRG: 603 | End: 2021-04-29
Attending: STUDENT IN AN ORGANIZED HEALTH CARE EDUCATION/TRAINING PROGRAM | Admitting: STUDENT IN AN ORGANIZED HEALTH CARE EDUCATION/TRAINING PROGRAM
Payer: COMMERCIAL

## 2021-04-28 VITALS
DIASTOLIC BLOOD PRESSURE: 83 MMHG | HEIGHT: 74 IN | TEMPERATURE: 100 F | SYSTOLIC BLOOD PRESSURE: 143 MMHG | HEART RATE: 107 BPM | WEIGHT: 255.07 LBS | OXYGEN SATURATION: 95 % | RESPIRATION RATE: 18 BRPM

## 2021-04-28 DIAGNOSIS — L03.90 CELLULITIS, UNSPECIFIED: ICD-10-CM

## 2021-04-28 DIAGNOSIS — I10 ESSENTIAL (PRIMARY) HYPERTENSION: ICD-10-CM

## 2021-04-28 DIAGNOSIS — L98.9 DISORDER OF THE SKIN AND SUBCUTANEOUS TISSUE, UNSPECIFIED: ICD-10-CM

## 2021-04-28 DIAGNOSIS — R74.01 ELEVATION OF LEVELS OF LIVER TRANSAMINASE LEVELS: ICD-10-CM

## 2021-04-28 DIAGNOSIS — E66.9 OBESITY, UNSPECIFIED: ICD-10-CM

## 2021-04-28 DIAGNOSIS — M10.9 GOUT, UNSPECIFIED: ICD-10-CM

## 2021-04-28 DIAGNOSIS — R63.8 OTHER SYMPTOMS AND SIGNS CONCERNING FOOD AND FLUID INTAKE: ICD-10-CM

## 2021-04-28 LAB
ALBUMIN SERPL ELPH-MCNC: 4.1 G/DL — SIGNIFICANT CHANGE UP (ref 3.3–5)
ALP SERPL-CCNC: 83 U/L — SIGNIFICANT CHANGE UP (ref 40–120)
ALT FLD-CCNC: 34 U/L — SIGNIFICANT CHANGE UP (ref 10–45)
ANION GAP SERPL CALC-SCNC: 13 MMOL/L — SIGNIFICANT CHANGE UP (ref 5–17)
APTT BLD: 26.5 SEC — LOW (ref 27.5–35.5)
AST SERPL-CCNC: 44 U/L — HIGH (ref 10–40)
BASOPHILS # BLD AUTO: 0.04 K/UL — SIGNIFICANT CHANGE UP (ref 0–0.2)
BASOPHILS NFR BLD AUTO: 0.5 % — SIGNIFICANT CHANGE UP (ref 0–2)
BILIRUB SERPL-MCNC: 0.7 MG/DL — SIGNIFICANT CHANGE UP (ref 0.2–1.2)
BLD GP AB SCN SERPL QL: NEGATIVE — SIGNIFICANT CHANGE UP
BUN SERPL-MCNC: 15 MG/DL — SIGNIFICANT CHANGE UP (ref 7–23)
CALCIUM SERPL-MCNC: 9.8 MG/DL — SIGNIFICANT CHANGE UP (ref 8.4–10.5)
CHLORIDE SERPL-SCNC: 106 MMOL/L — SIGNIFICANT CHANGE UP (ref 96–108)
CO2 SERPL-SCNC: 24 MMOL/L — SIGNIFICANT CHANGE UP (ref 22–31)
CREAT SERPL-MCNC: 1.11 MG/DL — SIGNIFICANT CHANGE UP (ref 0.5–1.3)
CRP SERPL-MCNC: 25.9 MG/L — HIGH (ref 0–4)
EOSINOPHIL # BLD AUTO: 0.09 K/UL — SIGNIFICANT CHANGE UP (ref 0–0.5)
EOSINOPHIL NFR BLD AUTO: 1 % — SIGNIFICANT CHANGE UP (ref 0–6)
ERYTHROCYTE [SEDIMENTATION RATE] IN BLOOD: 56 MM/HR — HIGH
GLUCOSE SERPL-MCNC: 84 MG/DL — SIGNIFICANT CHANGE UP (ref 70–99)
HCT VFR BLD CALC: 41.1 % — SIGNIFICANT CHANGE UP (ref 39–50)
HGB BLD-MCNC: 13.6 G/DL — SIGNIFICANT CHANGE UP (ref 13–17)
IMM GRANULOCYTES NFR BLD AUTO: 0.3 % — SIGNIFICANT CHANGE UP (ref 0–1.5)
INR BLD: 1.13 — SIGNIFICANT CHANGE UP (ref 0.88–1.16)
LYMPHOCYTES # BLD AUTO: 2.09 K/UL — SIGNIFICANT CHANGE UP (ref 1–3.3)
LYMPHOCYTES # BLD AUTO: 24.1 % — SIGNIFICANT CHANGE UP (ref 13–44)
MCHC RBC-ENTMCNC: 28.8 PG — SIGNIFICANT CHANGE UP (ref 27–34)
MCHC RBC-ENTMCNC: 33.1 GM/DL — SIGNIFICANT CHANGE UP (ref 32–36)
MCV RBC AUTO: 87.1 FL — SIGNIFICANT CHANGE UP (ref 80–100)
MONOCYTES # BLD AUTO: 0.72 K/UL — SIGNIFICANT CHANGE UP (ref 0–0.9)
MONOCYTES NFR BLD AUTO: 8.3 % — SIGNIFICANT CHANGE UP (ref 2–14)
NEUTROPHILS # BLD AUTO: 5.71 K/UL — SIGNIFICANT CHANGE UP (ref 1.8–7.4)
NEUTROPHILS NFR BLD AUTO: 65.8 % — SIGNIFICANT CHANGE UP (ref 43–77)
NRBC # BLD: 0 /100 WBCS — SIGNIFICANT CHANGE UP (ref 0–0)
PLATELET # BLD AUTO: 273 K/UL — SIGNIFICANT CHANGE UP (ref 150–400)
POTASSIUM SERPL-MCNC: 4 MMOL/L — SIGNIFICANT CHANGE UP (ref 3.5–5.3)
POTASSIUM SERPL-SCNC: 4 MMOL/L — SIGNIFICANT CHANGE UP (ref 3.5–5.3)
PROT SERPL-MCNC: 8.2 G/DL — SIGNIFICANT CHANGE UP (ref 6–8.3)
PROTHROM AB SERPL-ACNC: 13.5 SEC — SIGNIFICANT CHANGE UP (ref 10.6–13.6)
RBC # BLD: 4.72 M/UL — SIGNIFICANT CHANGE UP (ref 4.2–5.8)
RBC # FLD: 13.3 % — SIGNIFICANT CHANGE UP (ref 10.3–14.5)
RH IG SCN BLD-IMP: POSITIVE — SIGNIFICANT CHANGE UP
SARS-COV-2 RNA SPEC QL NAA+PROBE: SIGNIFICANT CHANGE UP
SODIUM SERPL-SCNC: 143 MMOL/L — SIGNIFICANT CHANGE UP (ref 135–145)
WBC # BLD: 8.68 K/UL — SIGNIFICANT CHANGE UP (ref 3.8–10.5)
WBC # FLD AUTO: 8.68 K/UL — SIGNIFICANT CHANGE UP (ref 3.8–10.5)

## 2021-04-28 PROCEDURE — 99283 EMERGENCY DEPT VISIT LOW MDM: CPT

## 2021-04-28 PROCEDURE — 99223 1ST HOSP IP/OBS HIGH 75: CPT | Mod: GC

## 2021-04-28 PROCEDURE — 99285 EMERGENCY DEPT VISIT HI MDM: CPT

## 2021-04-28 RX ORDER — AMLODIPINE BESYLATE 2.5 MG/1
5 TABLET ORAL DAILY
Refills: 0 | Status: DISCONTINUED | OUTPATIENT
Start: 2021-04-28 | End: 2021-04-29

## 2021-04-28 RX ORDER — VANCOMYCIN HCL 1 G
1750 VIAL (EA) INTRAVENOUS ONCE
Refills: 0 | Status: COMPLETED | OUTPATIENT
Start: 2021-04-28 | End: 2021-04-28

## 2021-04-28 RX ORDER — VANCOMYCIN HCL 1 G
1750 VIAL (EA) INTRAVENOUS EVERY 12 HOURS
Refills: 0 | Status: COMPLETED | OUTPATIENT
Start: 2021-04-29 | End: 2021-04-29

## 2021-04-28 RX ORDER — ALLOPURINOL 300 MG
0.5 TABLET ORAL
Qty: 0 | Refills: 0 | DISCHARGE

## 2021-04-28 RX ORDER — ACETAMINOPHEN 500 MG
650 TABLET ORAL EVERY 6 HOURS
Refills: 0 | Status: DISCONTINUED | OUTPATIENT
Start: 2021-04-28 | End: 2021-04-29

## 2021-04-28 RX ORDER — MELOXICAM 15 MG/1
1 TABLET ORAL
Qty: 0 | Refills: 0 | DISCHARGE

## 2021-04-28 RX ORDER — ENOXAPARIN SODIUM 100 MG/ML
40 INJECTION SUBCUTANEOUS EVERY 24 HOURS
Refills: 0 | Status: DISCONTINUED | OUTPATIENT
Start: 2021-04-29 | End: 2021-04-29

## 2021-04-28 RX ORDER — ALLOPURINOL 300 MG
50 TABLET ORAL DAILY
Refills: 0 | Status: DISCONTINUED | OUTPATIENT
Start: 2021-04-28 | End: 2021-04-29

## 2021-04-28 RX ADMIN — Medication 250 MILLIGRAM(S): at 16:38

## 2021-04-28 RX ADMIN — Medication 1750 MILLIGRAM(S): at 19:14

## 2021-04-28 NOTE — ED ADULT TRIAGE NOTE - NS ED TRIAGE AVPU SCALE
Alert-The patient is alert, awake and responds to voice. The patient is oriented to time, place, and person. The triage nurse is able to obtain subjective information.

## 2021-04-28 NOTE — H&P ADULT - ATTENDING COMMENTS
reviewed pertinent data , h&p  patient seen and examined overnight   reports improvement in cellulitis s/p IV vancomycin, pain scale decreased from 10/10 to 4/10    PE as above w/  exceptions/ additions: +R elbow w/ overlying erythema swelling, decreased ROM on flexion improving since admission.       1. c/w vancomycin, monitor trough, followup blood ctxs, ortho recs        rest of  plan as above

## 2021-04-28 NOTE — ED ADULT TRIAGE NOTE - CHIEF COMPLAINT QUOTE
Pt presents reporting rt elbow pain, struck elbow while working on sunday, diagnosed with cellulitis on monday by MD Frankel. Pt on PO abx, swelling and rednessed increased in size. Referred to [facility] ED for IV abx by md Frankel.

## 2021-04-28 NOTE — ED ADULT NURSE NOTE - OBJECTIVE STATEMENT
Swelling, redness, pain, tenderness to right elbow, states increased from last week s/p injury , states hit right elbow against a pot last Thursday, 1 week ago.  Was seen in ED and by an Orthopedist, Xray done, prescribed Cephalexin w/ continued increase in area of redness, swelling and pain.  Denies any fever or chills.  PMhx  Gout .

## 2021-04-28 NOTE — H&P ADULT - PROBLEM SELECTOR PLAN 4
Likely 2/2 hepatic steatosis seen on prior imaging  - Would confirm that patient has seen GI outpatient; if not would set up with outpatient f/u upon discharge

## 2021-04-28 NOTE — H&P ADULT - HISTORY OF PRESENT ILLNESS
45 y/o Male w/ hx of HTN, gout, nephritic syndrome who presented to the ED over the weekend with new swelling and pain in his right elbow. He was discharged and followed up with Dr. Frankel 2 days ago. He was started on PO keflex. Today he had a telehealth followup with Dr. Frankel and the patient stated the swelling and erythema had worsened despite antibiotics. He has pain in the right elbow aggravated by movement, alleviated by rest. There is no numbness or radiation of pain. He denies fevers/chills at home.    In the ED, vitals T-99.7, HR-107, BP-143/83, RR-18, SpO2-95% on RA. Labs significant for elevated ESR to 56 and CRP to 25.9, AST 44 (59 on 2/16/21). Otherwise CBC and CMP WINL. XR elbow performed 4/25 w/ no evidence of fracture of dislocation, soft tissue swelling over olecranon region. He received vancomycin 1750mg. Ortho evaluated patient and believed patient to have low suspicion for septic arthritis and have no plan for operative interventions at this time. Recommended admission to medicine for IV abx. 45 y/o Male w/ hx of HTN, gout, nephritic syndrome who presented to the ED over the weekend with new swelling and pain in his right elbow. He states that initially he banged his arm on a hot pot while at work on Saturday and that is when the redness began. He then noticed it was increasing in size on Sunday so he went to the ED. He was discharged and followed up with Dr. Frankel on Monday when he was started on PO keflex. Today he had a telehealth followup with Dr. Frankel and the patient stated the swelling and erythema had worsened despite antibiotics. He has pain in the right elbow aggravated by movement, alleviated by rest. There is no numbness or radiation of pain. He denies fevers/chills at home. He endorses rash on his left upper back that he states comes and goes since starting allopurinol 1 year ago on a daily basis but he believes it restarted with the abx he was receiving. No diarrhea/constipation, CP, SOB, abdominal pain, dysuria. Endorsed left lower extremity swelling with standing for long periods but otherwise no current swelling.    In the ED, vitals T-99.7, HR-107, BP-143/83, RR-18, SpO2-95% on RA. Labs significant for elevated ESR to 56 and CRP to 25.9, AST 44 (59 on 2/16/21). Otherwise CBC and CMP WINL. XR elbow performed 4/25 w/ no evidence of fracture of dislocation, soft tissue swelling over olecranon region. He received vancomycin 1750mg. Ortho evaluated patient and believed patient to have low suspicion for septic arthritis and have no plan for operative interventions at this time. Recommended admission to medicine for IV abx.

## 2021-04-28 NOTE — ED PROVIDER NOTE - OBJECTIVE STATEMENT
46M with a h/o HTN, DELMA, gout, nephritic syndrome who p/w 46M RHD with a h/o HTN, DELMA, gout, nephritic syndrome who p/w worsening swelling, redness, and pain in his right elbow. Pt was seen in the ER 3 days ago for bursitis, had a negative XR and f/u with Dr. frankel on Monday who started him on Keflex. Today he had a telehealth followup with Dr. Frankel and the patient stated the swelling and erythema had worsened and spread outside outlined area despite antibiotics so he was sent to the ER for IV abx. Pt reports the pain is worse with movement and better with rest. No f/c, no cp/sob, no n/v, no abd pain, no ha or neck pain, no dizziness or syncope, no n/t/w in ext, no numbness or radiation of pain.

## 2021-04-28 NOTE — H&P ADULT - NSHPPHYSICALEXAM_GEN_ALL_CORE
.  VITAL SIGNS:  T(C): 36.9 (04-28-21 @ 17:30), Max: 37.6 (04-28-21 @ 14:42)  T(F): 98.5 (04-28-21 @ 17:30), Max: 99.7 (04-28-21 @ 14:42)  HR: 85 (04-28-21 @ 17:30) (85 - 107)  BP: 136/72 (04-28-21 @ 17:30) (136/72 - 143/83)  BP(mean): --  RR: 18 (04-28-21 @ 17:30) (18 - 18)  SpO2: 100% (04-28-21 @ 17:30) (95% - 100%)  Wt(kg): --    PHYSICAL EXAM:    Constitutional: WDWN resting comfortably in bed; NAD  Head: NC/AT  Eyes: PERRL, EOMI, anicteric sclera  ENT: no nasal discharge; uvula midline, no oropharyngeal erythema or exudates; MMM  Neck: supple; no JVD or thyromegaly  Respiratory: CTA B/L; no W/R/R, no retractions  Cardiac: +S1/S2; RRR; no M/R/G; PMI non-displaced  Gastrointestinal: soft, NT/ND; no rebound or guarding; +BSx4  Genitourinary: normal external genitalia  Back: spine midline, no bony tenderness or step-offs; no CVAT B/L  Extremities: WWP, no clubbing or cyanosis; no peripheral edema. Capillary refill <2 sec  Musculoskeletal: NROM x4; no joint swelling, tenderness or erythema  Vascular: 2+ radial, femoral, DP/PT pulses B/L  Dermatologic: skin warm, dry and intact; no rashes, wounds, or scars  Lymphatic: no submandibular or cervical LAD  Neurologic: AAOx3; CNII-XII grossly intact; no focal deficits  Psychiatric: affect and characteristics of appearance, verbalizations, behaviors are appropriate .  VITAL SIGNS:  T(C): 36.9 (04-28-21 @ 17:30), Max: 37.6 (04-28-21 @ 14:42)  T(F): 98.5 (04-28-21 @ 17:30), Max: 99.7 (04-28-21 @ 14:42)  HR: 85 (04-28-21 @ 17:30) (85 - 107)  BP: 136/72 (04-28-21 @ 17:30) (136/72 - 143/83)  BP(mean): --  RR: 18 (04-28-21 @ 17:30) (18 - 18)  SpO2: 100% (04-28-21 @ 17:30) (95% - 100%)  Wt(kg): --    PHYSICAL EXAM:    Constitutional: Obese male resting comfortably in bed; NAD  Head: NC/AT  Eyes: PERRL, EOMI, anicteric sclera  ENT: no nasal discharge; uvula midline, no oropharyngeal erythema or exudates; MMM  Neck: supple; no JVD or thyromegaly  Respiratory: CTA B/L; no W/R/R, no retractions  Cardiac: +S1/S2; RRR; no M/R/G; PMI non-displaced  Gastrointestinal: soft, NT/ND; no rebound or guarding; +BSx4  Genitourinary: normal external genitalia  Back: spine midline, no bony tenderness or step-offs; no CVAT B/L  Extremities: WWP, no clubbing or cyanosis; no peripheral edema. Capillary refill <2 sec; R arm surrounding elbow and upper forearm w/ eryethema and warmth; tender to touch, decreased mobility of joint  Musculoskeletal: NROM x3, decreased R elbow w/ tenderness and erythema +warmth  Vascular: 2+ radial, femoral, DP/PT pulses B/L  Dermatologic: skin warm, dry and intact; scar on R lower abdomen from prior appendectomy, slightly raised darkened macular lesions on L upper back in cluster, patient denies pruritis or pain; R arm surrounding elbow and upper forearm w/ eryethema and warmth; tender to touch   Lymphatic: no submandibular or cervical LAD  Neurologic: AAOx3; CNII-XII grossly intact; no focal deficits  Psychiatric: affect and characteristics of appearance, verbalizations, behaviors are appropriate

## 2021-04-28 NOTE — ED PROVIDER NOTE - PHYSICAL EXAMINATION
GEN: Well appearing, well nourished, awake, alert, oriented to person, place, time/situation and in no apparent distress. NTAF  ENT: Airway patent, Nasal mucosa clear. Mouth with normal mucosa.  EYES: Clear bilaterally. PERRL, EOMI  RESPIRATORY: Breathing comfortably with normal RR. No W/C/R, no hypoxia or resp distress.  CARDIAC: Regular rate and rhythm, no M/R/G  ABDOMEN: Soft, nontender, +bowel sounds, no rebound, rigidity, or guarding.  MSK: Range of motion is not limited, no deformities noted, no internal pain with ROM (pt reports skin feels "stretched" when bending elbow). Distal ext NVI with +2 pulses, compartments soft, no ligamentous instability.  NEURO: Alert and oriented, no focal deficits.  SKIN: Skin normal color for race, warm, dry and intact. Right posterior arm/elbow cellulitis, no crepitus, gangrene, or fluctuance.  PSYCH: Alert and oriented to person, place, time/situation. normal mood and affect. no apparent risk to self or others.

## 2021-04-28 NOTE — H&P ADULT - ASSESSMENT
45 y/o Male w/ hx of HTN, gout, nephritic syndrome who presented to the ED over the weekend with new swelling and pain in his right elbow w/ no improvement s/p administration of PO keflex admitted for cellulitis

## 2021-04-28 NOTE — ED PROVIDER NOTE - CLINICAL SUMMARY MEDICAL DECISION MAKING FREE TEXT BOX
46M RHD with a h/o HTN, DELMA, gout, nephritic syndrome who p/w worsening swelling, redness, and pain in his right elbow. Pt was seen in the ER 3 days ago for bursitis, had a negative XR and f/u with Dr. frankel on Monday who started him on Keflex. Today he had a telehealth followup with Dr. Frankel and the patient stated the swelling and erythema had worsened and spread outside outlined area despite antibiotics so he was sent to the ER for IV abx. Pt reports the pain is worse with movement and better with rest. No f/c, no cp/sob, no n/v, no abd pain, no ha or neck pain, no dizziness or syncope, no n/t/w in ext, no numbness or radiation of pain.  On exam, VSS, right elbow cellulitis noted, exam no c/w septic joint at this time, compartments soft, NVI distally, labs and cultures sent and pt seen by ortho who recommends admit to medicine for IV abx. They do not think the patient has septic joint but will follow on consult as needed.   Pt stable for rmf at this time. IV vanc given.

## 2021-04-28 NOTE — ED ADULT NURSE REASSESSMENT NOTE - NS ED NURSE REASSESS COMMENT FT1
Vancomycin IVPB completed, no adverse rxn. , no new symptom complaint.  Vital signs stable. Ortho consult pending. For admit.

## 2021-04-28 NOTE — ED ADULT NURSE NOTE - CHPI ED NUR SYMPTOMS NEG
no back pain/no bruising/no deformity/no difficulty bearing weight/no fever/no numbness/no tingling/no weakness

## 2021-04-28 NOTE — H&P ADULT - PROBLEM/PLAN-6
Last visit 8/26/20  Last refill 9/20/20 - checked   Rita Marvin RN  [facility]   DISPLAY PLAN FREE TEXT

## 2021-04-28 NOTE — H&P ADULT - PROBLEM SELECTOR PLAN 3
Hx of gout. No recent attacks. Previously on steroids for gout attack and currently takes daily allopurinol 50mg  - C/w daily allopurinol

## 2021-04-28 NOTE — H&P ADULT - PROBLEM SELECTOR PLAN 1
Patient w/ erythema and warmth of R arm involving elbow w/ decreased ROM. Ddx includes cellulitis vs. gout attack vs. septic arthritis or multifactorial w/ combination. Seen by ortho who does not believe patient w/ septic arthritis. Labs w/ elevated ESR/CRP but no other abnormalities (no leukocytosis). Typically joint aspirations not performed in the setting of overlying cellulitis in the setting possibility of seeding infection into the joint.  - Would c/w IV abx for treatment of cellulitis  - Will continue to monitor erythema

## 2021-04-28 NOTE — CONSULT NOTE ADULT - SUBJECTIVE AND OBJECTIVE BOX
Orthopaedic Surgery Consult Note    CC: Patient is a 46y old  Male who presents with a chief complaint of right elbow cellulitis    HPI:  47yo Male who presented to the ED over the weekend with new swelling and pain in his right elbow. He was discharged and followed up with Dr. Frankel 2 days ago. He was started on PO keflex. Today he had a telehealth followup with Dr. Frankel and the patient stated the swelling and erythema had worsened despite antibiotics. He has pain in the right elbow aggravated by movement, alleviated by rest. There is no numbness or radiation of pain. He denies fevers/chills at home.    ROS: Positive for right elbow pain and erythema  Denies fevers, chills, headache, dizziness, chest pain, shortness of breath, nausea, vomiting.   All other systems negative, except for above.     Allergies  NKDA    PAST MEDICAL & SURGICAL HISTORY:  Gout  No significant past surgical history    Social History:  Pt is a nonsmoker.   He drinks EtOH occasionally    FAMILY HISTORY:  Father with heart disease and diabetes    Medications:   Allopurinol  Amlodipine   Keflex x 3 days    Vital Signs Last 24 Hrs  T(C): 37.6 (28 Apr 2021 14:42), Max: 37.6 (28 Apr 2021 14:42)  T(F): 99.7 (28 Apr 2021 14:42), Max: 99.7 (28 Apr 2021 14:42)  HR: 107 (28 Apr 2021 14:42) (107 - 107)  BP: 143/83 (28 Apr 2021 14:42) (143/83 - 143/83)  RR: 18 (28 Apr 2021 14:42) (18 - 18)  SpO2: 95% (28 Apr 2021 14:42) (95% - 95%)    PE:  Constitutional: vitals reviewed per nursing documentation, NAD, lying comfortably in stretcher  Eyes: PERRLA, no obvious deformity, ecchymoses, swelling of eyelids  Neck: trachea midline, soft, supple, nontender   Lungs: not using accessory muscles of respiration, normal respiratory effort  Neuro/Psych: A&Ox3, normal affect and mood  MSK:   LUE - sensation intact to light touch, 5/5 /biceps/triceps strength, 2+ radial pulse, no open wounds/erythema/ecchymoses, no tenderness through upper extremity, FROM without pain, no instability or laxity of joints  RUE - erythema and swelling surrounding olecranon with tenderness, FROM with only "stretching" pain at the skin, no internal pain with ROM, sensation intact to light touch, 5/5 /biceps/triceps strength with pain at the elbow, 2+ radial pulse, no open wounds/ecchymoses, no instability or laxity of joints                          13.6   8.68  )-----------( 273      ( 28 Apr 2021 15:42 )             41.1     04-28    143  |  106  |  15  ----------------------------<  84  4.0   |  24  |  1.11    Ca    9.8      28 Apr 2021 15:42    TPro  8.2  /  Alb  4.1  /  TBili  0.7  /  DBili  x   /  AST  44<H>  /  ALT  34  /  AlkPhos  83  04-28    PT/INR - ( 28 Apr 2021 15:42 )   PT: 13.5 sec;   INR: 1.13          PTT - ( 28 Apr 2021 15:42 )  PTT:26.5 sec    Imaging: Radiographs of the right elbow from 4/25/21 demonstrate no fracture or dislocation, soft tissue swelling present over olecranon    A/P: 47yo Male with right elbow cellulitis not improving with PO keflex   - Low suspicion for septic arthritis  - No plan for operative intervention at this time  - Recommend IV antibiotics   - Pain control as needed   - Reviewed imaging and lab data   - Above plan discussed with Dr. Frankel  - Will continue to follow    Ortho Pager [phone]

## 2021-04-28 NOTE — ED ADULT TRIAGE NOTE - WEIGHT IN LBS
X Size Of Lesion In Cm (Optional): 0 159 Administered By (Optional): MARION Celis MD Concentration Of Solution Injected (Mg/Ml): 10.0 Medical Necessity Clause: This procedure was medically necessary because the lesions that were treated were: Total Volume Injected (Ccs- Only Use Numbers And Decimals): 1.6 Kenalog Preparation: Kenalog with 2% lidocaine without epinephrine Detail Level: Detailed Include Z78.9 (Other Specified Conditions Influencing Health Status) As An Associated Diagnosis?: No Consent: The risks of atrophy were reviewed with the patient.

## 2021-04-28 NOTE — ED ADULT NURSE REASSESSMENT NOTE - NS ED NURSE REASSESS COMMENT FT1
Patient a/oX3, anxious, c/o of tolerable pain on right elbow. Vital signs stable, afebrile.  Left AC PIV #20 in place, all labs sent, blood cultures sent, no complications.  Vancomycin IVPB ongoing, no adverse rxn.  Ortho consult pending.  For admit.

## 2021-04-28 NOTE — ED ADULT NURSE REASSESSMENT NOTE - NS ED NURSE REASSESS COMMENT FT1
Patient a/oX3, c/o of pain , redness and increased swelling right elbow.  Vital signs stable.  Left AC PIV #20 in place, all labs, blood cultures sent, no complications.  Ortho consult pending.  Stable and comfortable.

## 2021-04-28 NOTE — H&P ADULT - PROBLEM SELECTOR PLAN 5
F: tolerating PO, no IVF  E: replete K<4, Mg<2  N: Dash/TLC    VTE Prophylaxis: Lovenox SubQ  C: Full Code  D: RMF Nutrition consult

## 2021-04-28 NOTE — ED ADULT NURSE NOTE - CHIEF COMPLAINT QUOTE
Pt presents reporting rt elbow pain, struck elbow while working on sunday, diagnosed with cellulitis on monday by MD Frankel. Pt on PO abx, swelling and rednessed increased in size. Referred to [facility] ED for IV abx by md Frankel.

## 2021-04-28 NOTE — H&P ADULT - NSHPSOCIALHISTORY_GEN_ALL_CORE
Lives alone. 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 Denies tobacco, alcohol or recreational drug use.

## 2021-04-28 NOTE — H&P ADULT - NSHPLABSRESULTS_GEN_ALL_CORE
.  LABS:                         13.6   8.68  )-----------( 273      ( 28 Apr 2021 15:42 )             41.1     04-28    143  |  106  |  15  ----------------------------<  84  4.0   |  24  |  1.11    Ca    9.8      28 Apr 2021 15:42    TPro  8.2  /  Alb  4.1  /  TBili  0.7  /  DBili  x   /  AST  44<H>  /  ALT  34  /  AlkPhos  83  04-28    PT/INR - ( 28 Apr 2021 15:42 )   PT: 13.5 sec;   INR: 1.13          PTT - ( 28 Apr 2021 15:42 )  PTT:26.5 sec              RADIOLOGY, EKG & ADDITIONAL TESTS: Reviewed.

## 2021-04-29 ENCOUNTER — TRANSCRIPTION ENCOUNTER (OUTPATIENT)
Age: 46
End: 2021-04-29

## 2021-04-29 VITALS
RESPIRATION RATE: 18 BRPM | DIASTOLIC BLOOD PRESSURE: 80 MMHG | HEART RATE: 59 BPM | TEMPERATURE: 99 F | SYSTOLIC BLOOD PRESSURE: 130 MMHG | OXYGEN SATURATION: 100 %

## 2021-04-29 LAB
ALBUMIN SERPL ELPH-MCNC: 3.5 G/DL — SIGNIFICANT CHANGE UP (ref 3.3–5)
ALP SERPL-CCNC: 83 U/L — SIGNIFICANT CHANGE UP (ref 40–120)
ALT FLD-CCNC: 28 U/L — SIGNIFICANT CHANGE UP (ref 10–45)
ANION GAP SERPL CALC-SCNC: 11 MMOL/L — SIGNIFICANT CHANGE UP (ref 5–17)
AST SERPL-CCNC: 35 U/L — SIGNIFICANT CHANGE UP (ref 10–40)
BASOPHILS # BLD AUTO: 0.04 K/UL — SIGNIFICANT CHANGE UP (ref 0–0.2)
BASOPHILS NFR BLD AUTO: 0.5 % — SIGNIFICANT CHANGE UP (ref 0–2)
BILIRUB SERPL-MCNC: 0.9 MG/DL — SIGNIFICANT CHANGE UP (ref 0.2–1.2)
BUN SERPL-MCNC: 13 MG/DL — SIGNIFICANT CHANGE UP (ref 7–23)
CALCIUM SERPL-MCNC: 9.4 MG/DL — SIGNIFICANT CHANGE UP (ref 8.4–10.5)
CHLORIDE SERPL-SCNC: 103 MMOL/L — SIGNIFICANT CHANGE UP (ref 96–108)
CO2 SERPL-SCNC: 24 MMOL/L — SIGNIFICANT CHANGE UP (ref 22–31)
COVID-19 SPIKE DOMAIN AB INTERP: POSITIVE
COVID-19 SPIKE DOMAIN ANTIBODY RESULT: >250 U/ML — HIGH
CREAT SERPL-MCNC: 1.1 MG/DL — SIGNIFICANT CHANGE UP (ref 0.5–1.3)
CRP SERPL-MCNC: 20.7 MG/L — HIGH (ref 0–4)
EOSINOPHIL # BLD AUTO: 0.09 K/UL — SIGNIFICANT CHANGE UP (ref 0–0.5)
EOSINOPHIL NFR BLD AUTO: 1.1 % — SIGNIFICANT CHANGE UP (ref 0–6)
GLUCOSE SERPL-MCNC: 130 MG/DL — HIGH (ref 70–99)
HCT VFR BLD CALC: 38.4 % — LOW (ref 39–50)
HGB BLD-MCNC: 12.8 G/DL — LOW (ref 13–17)
HIV 1+2 AB+HIV1 P24 AG SERPL QL IA: SIGNIFICANT CHANGE UP
IMM GRANULOCYTES NFR BLD AUTO: 0.5 % — SIGNIFICANT CHANGE UP (ref 0–1.5)
LYMPHOCYTES # BLD AUTO: 2.43 K/UL — SIGNIFICANT CHANGE UP (ref 1–3.3)
LYMPHOCYTES # BLD AUTO: 29.5 % — SIGNIFICANT CHANGE UP (ref 13–44)
MAGNESIUM SERPL-MCNC: 2.1 MG/DL — SIGNIFICANT CHANGE UP (ref 1.6–2.6)
MCHC RBC-ENTMCNC: 29 PG — SIGNIFICANT CHANGE UP (ref 27–34)
MCHC RBC-ENTMCNC: 33.3 GM/DL — SIGNIFICANT CHANGE UP (ref 32–36)
MCV RBC AUTO: 87.1 FL — SIGNIFICANT CHANGE UP (ref 80–100)
MONOCYTES # BLD AUTO: 0.72 K/UL — SIGNIFICANT CHANGE UP (ref 0–0.9)
MONOCYTES NFR BLD AUTO: 8.7 % — SIGNIFICANT CHANGE UP (ref 2–14)
NEUTROPHILS # BLD AUTO: 4.93 K/UL — SIGNIFICANT CHANGE UP (ref 1.8–7.4)
NEUTROPHILS NFR BLD AUTO: 59.7 % — SIGNIFICANT CHANGE UP (ref 43–77)
NRBC # BLD: 0 /100 WBCS — SIGNIFICANT CHANGE UP (ref 0–0)
PHOSPHATE SERPL-MCNC: 3.5 MG/DL — SIGNIFICANT CHANGE UP (ref 2.5–4.5)
PLATELET # BLD AUTO: 257 K/UL — SIGNIFICANT CHANGE UP (ref 150–400)
POTASSIUM SERPL-MCNC: 4 MMOL/L — SIGNIFICANT CHANGE UP (ref 3.5–5.3)
POTASSIUM SERPL-SCNC: 4 MMOL/L — SIGNIFICANT CHANGE UP (ref 3.5–5.3)
PROT SERPL-MCNC: 7.2 G/DL — SIGNIFICANT CHANGE UP (ref 6–8.3)
RBC # BLD: 4.41 M/UL — SIGNIFICANT CHANGE UP (ref 4.2–5.8)
RBC # FLD: 13.5 % — SIGNIFICANT CHANGE UP (ref 10.3–14.5)
SARS-COV-2 IGG+IGM SERPL QL IA: >250 U/ML — HIGH
SARS-COV-2 IGG+IGM SERPL QL IA: POSITIVE
SODIUM SERPL-SCNC: 138 MMOL/L — SIGNIFICANT CHANGE UP (ref 135–145)
WBC # BLD: 8.25 K/UL — SIGNIFICANT CHANGE UP (ref 3.8–10.5)
WBC # FLD AUTO: 8.25 K/UL — SIGNIFICANT CHANGE UP (ref 3.8–10.5)

## 2021-04-29 PROCEDURE — 99239 HOSP IP/OBS DSCHRG MGMT >30: CPT | Mod: GC

## 2021-04-29 RX ORDER — CEPHALEXIN 500 MG
1 CAPSULE ORAL
Qty: 0 | Refills: 0 | DISCHARGE

## 2021-04-29 RX ORDER — IBUPROFEN 200 MG
600 TABLET ORAL ONCE
Refills: 0 | Status: COMPLETED | OUTPATIENT
Start: 2021-04-29 | End: 2021-04-29

## 2021-04-29 RX ADMIN — Medication 50 MILLIGRAM(S): at 12:06

## 2021-04-29 RX ADMIN — ENOXAPARIN SODIUM 40 MILLIGRAM(S): 100 INJECTION SUBCUTANEOUS at 06:21

## 2021-04-29 RX ADMIN — Medication 250 MILLIGRAM(S): at 05:06

## 2021-04-29 RX ADMIN — AMLODIPINE BESYLATE 5 MILLIGRAM(S): 2.5 TABLET ORAL at 06:21

## 2021-04-29 RX ADMIN — Medication 1 PATCH: at 18:09

## 2021-04-29 RX ADMIN — Medication 1 PATCH: at 12:07

## 2021-04-29 RX ADMIN — Medication 600 MILLIGRAM(S): at 00:40

## 2021-04-29 RX ADMIN — Medication 250 MILLIGRAM(S): at 16:41

## 2021-04-29 RX ADMIN — Medication 600 MILLIGRAM(S): at 01:04

## 2021-04-29 NOTE — PROGRESS NOTE ADULT - SUBJECTIVE AND OBJECTIVE BOX
Ortho Note    Pt seen and examined at bedside, stable, doing well, reports R elbow swelling improving. Denies CP, SOB, N/V, numbness/tingling     Vital Signs Last 24 Hrs  T(C): 36.9 (04-29-21 @ 06:26), Max: 37.1 (04-29-21 @ 02:59)  T(F): 98.5 (04-29-21 @ 06:26), Max: 98.8 (04-29-21 @ 02:59)  HR: 73 (04-29-21 @ 06:26) (73 - 76)  BP: 141/75 (04-29-21 @ 06:26) (141/75 - 151/81)  BP(mean): --  RR: 17 (04-29-21 @ 06:26) (17 - 18)  SpO2: 96% (04-29-21 @ 06:26) (96% - 96%)    General: Pt Alert and oriented, NAD  R elbow with mild swelling and cellulitis  No ecchymosis or drainage   Sensation intact C5-T1  Motor: Axillary/AIN/PIN/U grossly intact   2+ rad pulse   Fingers WWP    A/P: 46yMale with R elbow cellulitis   - Stable and clinically improving  - Continue abx   - Pain Control  - PT, WBS: WBAT  - Nonop  - Outpt follow up with Dr. Frankel     Ortho Pager [phone]

## 2021-04-29 NOTE — DISCHARGE NOTE NURSING/CASE MANAGEMENT/SOCIAL WORK - PATIENT PORTAL LINK FT
You can access the FollowMyHealth Patient Portal offered by [facility] by registering at the following website: [email or web address]. By joining [facility]’s FollowMyHealth portal, you will also be able to view your health information using other applications (apps) compatible with our system.

## 2021-04-29 NOTE — DISCHARGE NOTE PROVIDER - NSDCMRMEDTOKEN_GEN_ALL_CORE_FT
allopurinol 100 mg oral tablet: 0.5 tab(s) orally once a day  amLODIPine 5 mg oral tablet: 1 tab(s) orally once a day   cloNIDine 0.3 mg/24 hr transdermal film, extended release: 1 patch transdermal once a week  doxycycline hyclate 100 mg oral tablet: 1 tab(s) orally every 12 hours    mg oral tablet: 1 tab(s) orally every 8 hours, As Needed - for moderate pain   allopurinol 100 mg oral tablet: 0.5 tab(s) orally once a day  amLODIPine 5 mg oral tablet: 1 tab(s) orally once a day   cefadroxil 500 mg oral capsule: 1 cap(s) orally every 12 hours   cloNIDine 0.3 mg/24 hr transdermal film, extended release: 1 patch transdermal once a week   mg oral tablet: 1 tab(s) orally every 8 hours, As Needed - for moderate pain   allopurinol 100 mg oral tablet: 0.5 tab(s) orally once a day  amLODIPine 5 mg oral tablet: 1 tab(s) orally once a day   cefadroxil 500 mg oral capsule: 1 cap(s) orally every 12 hours   cloNIDine 0.3 mg/24 hr transdermal film, extended release: 1 patch transdermal once a week  doxycycline hyclate 100 mg oral capsule: 1 cap(s) orally every 12 hours    mg oral tablet: 1 tab(s) orally every 8 hours, As Needed - for moderate pain

## 2021-04-29 NOTE — DISCHARGE NOTE PROVIDER - HOSPITAL COURSE
Patient is __ yo M/F with past medical history of _____  Presented with _____, found to have ____ . Dispo    Problem List/Main Diagnoses (system-based):   Inpatient treatment course:   New medications:   Labs to be followed outpatient:   Exam to be followed outpatient:    45 y/o Male w/ hx of HTN, gout, nephritic syndrome who presented to the ED over the weekend with new swelling and pain in his right elbow w/ no improvement s/p administration of PO keflex admitted for cellulitis. Patient is now medically stable for safe discharge home.     # Cellulitis: Patient w/ erythema and warmth of R arm involving elbow w/ decreased ROM. Ddx includes cellulitis vs. gout attack vs. septic arthritis or multifactorial w/ combination. Seen by ortho who does not believe patient w/ septic arthritis. Labs w/ elevated ESR/CRP but no other abnormalities (no leukocytosis). Received IV vanc during admission.   - c/w doxycycline 100 mg BID x 6 days (complete 7 day course)   - f/u with PCP     #Hypertension: On clonidine weekly patch 0.3mg/day as well as amlodipine 5mg qd  - C/w home meds.     #Gout: Hx of gout. No recent attacks. Previously on steroids for gout attack and currently takes daily allopurinol 50mg  - C/w daily allopurinol.     #Elevated AST (SGOT).  Plan: Likely 2/2 hepatic steatosis seen on prior imaging  - f/u with PCP     New medications: doxycycline 100 mg BID    Labs to be followed outpatient: None     Exam to be followed outpatient: PCP   47 y/o Male w/ hx of HTN, gout, nephritic syndrome who presented to the ED over the weekend with new swelling and pain in his right elbow w/ no improvement s/p administration of PO keflex admitted for cellulitis. Patient is now medically stable for safe discharge home.     # Cellulitis: Patient w/ erythema and warmth of R arm involving elbow w/ decreased ROM. Ddx includes cellulitis vs. gout attack vs. septic arthritis or multifactorial w/ combination. Seen by ortho who does not believe patient w/ septic arthritis. Labs w/ elevated ESR/CRP but no other abnormalities (no leukocytosis). Received IV vanc during admission.   - c/w cefadroxil 500 mg BID x 6 days (complete 7 day course)   - f/u with PCP     #Hypertension: On clonidine weekly patch 0.3mg/day as well as amlodipine 5mg qd  - C/w home meds.     #Gout: Hx of gout. No recent attacks. Previously on steroids for gout attack and currently takes daily allopurinol 50mg  - C/w daily allopurinol.     #Elevated AST (SGOT).  Plan: Likely 2/2 hepatic steatosis seen on prior imaging  - f/u with PCP     New medications: doxycycline 100 mg BID    Labs to be followed outpatient: None     Exam to be followed outpatient: PCP   45 y/o Male w/ hx of HTN, gout, nephritic syndrome who presented to the ED over the weekend with new swelling and pain in his right elbow w/ no improvement s/p administration of PO keflex admitted for cellulitis. Patient is now medically stable for safe discharge home.     # Cellulitis: Patient w/ erythema and warmth of R arm involving elbow w/ decreased ROM. Ddx includes cellulitis vs. gout attack vs. septic arthritis or multifactorial w/ combination. Seen by ortho who does not believe patient w/ septic arthritis. Labs w/ elevated ESR/CRP but no other abnormalities (no leukocytosis). Received IV vanc during admission.   - c/w cefadroxil 500 mg BID x 6 days (complete 7 day course)   - c/w doxycycline 100 mg BID x 6 days  (complete 7 day course)   - f/u with PCP     #Hypertension: On clonidine weekly patch 0.3mg/day as well as amlodipine 5mg qd  - C/w home meds.     #Gout: Hx of gout. No recent attacks. Previously on steroids for gout attack and currently takes daily allopurinol 50mg  - C/w daily allopurinol.     #Elevated AST (SGOT).  Plan: Likely 2/2 hepatic steatosis seen on prior imaging  - f/u with PCP     New medications: doxycycline 100 mg BID    Labs to be followed outpatient: None     Exam to be followed outpatient: PCP

## 2021-04-29 NOTE — DISCHARGE NOTE PROVIDER - CARE PROVIDER_API CALL
FREDIS EMERSON  [id number]  [address] [place] [address]  Phone: [phone]  Fax: [phone]  Follow Up Time:    FREDIS EMERSON  [id number]  [address] [place] [address]  Phone: [phone]  Fax: [phone]  Scheduled Appointment: 05/20/2021 10:15 AM

## 2021-04-29 NOTE — DISCHARGE NOTE PROVIDER - PROVIDER TOKENS
PROVIDER:[TOKEN:[[id number]]] PROVIDER:[TOKEN:[[id number]],SCHEDULEDAPPT:[05/20/2021],SCHEDULEDAPPTTIME:[10:15 AM]]

## 2021-04-29 NOTE — DISCHARGE NOTE PROVIDER - NSDCCPCAREPLAN_GEN_ALL_CORE_FT
PRINCIPAL DISCHARGE DIAGNOSIS  Diagnosis: Cellulitis  Assessment and Plan of Treatment: You were found to have a skin infection called cellulitis. You were treated with intravenous antibiotics during your stay at [facility]. Please continue to take doxycycline 100 mg twice a day for 6 more days, and follow-up with your primary care physician. Should you start to notice symptoms such as but not limited to: high persisting fevers (>104 F or lasting more than 3 days), severe pain, increased swelling and/or skin color changes after finishing your antibiotics, please return to the emergency department for interval evaluation.      SECONDARY DISCHARGE DIAGNOSES  Diagnosis: Elbow pain, right  Assessment and Plan of Treatment:      PRINCIPAL DISCHARGE DIAGNOSIS  Diagnosis: Cellulitis  Assessment and Plan of Treatment: You were found to have a skin infection called cellulitis. You were treated with intravenous antibiotics during your stay at [facility]. Please continue to take doxycycline 100 mg twice a day for 6 more days, and follow-up with your primary care physician. Should you start to notice symptoms such as but not limited to: high persisting fevers (>104 F or lasting more than 3 days), severe pain, increased swelling and/or skin color changes after finishing your antibiotics, please return to the emergency department for interval evaluation.       PRINCIPAL DISCHARGE DIAGNOSIS  Diagnosis: Cellulitis  Assessment and Plan of Treatment: You were found to have a skin infection called cellulitis. You were treated with intravenous antibiotics during your stay at [facility]. Please continue to take your antibiotics-cefadroxil 500 mg twice a day for 6 more days, and follow-up with your primary care physician. Should you start to notice symptoms such as but not limited to: high persisting fevers (>104 F or lasting more than 3 days), severe pain, increased swelling and/or skin color changes after finishing your antibiotics, please return to the emergency department for interval evaluation.       PRINCIPAL DISCHARGE DIAGNOSIS  Diagnosis: Cellulitis  Assessment and Plan of Treatment: You were found to have a skin infection called cellulitis. You were treated with intravenous antibiotics during your stay at [facility]. Please continue to take your antibiotics-cefadroxil 500 mg twice a day for 6 more days, and doxycycline 100 mg twice a day for 6 more days and follow-up with your primary care physician. Should you start to notice symptoms such as but not limited to: high persisting fevers (>104 F or lasting more than 3 days), severe pain, increased swelling and/or skin color changes after finishing your antibiotics, please return to the emergency department for interval evaluation.

## 2021-05-04 DIAGNOSIS — L98.9 DISORDER OF THE SKIN AND SUBCUTANEOUS TISSUE, UNSPECIFIED: ICD-10-CM

## 2021-05-04 DIAGNOSIS — N05.9 UNSPECIFIED NEPHRITIC SYNDROME WITH UNSPECIFIED MORPHOLOGIC CHANGES: ICD-10-CM

## 2021-05-04 DIAGNOSIS — L03.113 CELLULITIS OF RIGHT UPPER LIMB: ICD-10-CM

## 2021-05-04 DIAGNOSIS — K76.0 FATTY (CHANGE OF) LIVER, NOT ELSEWHERE CLASSIFIED: ICD-10-CM

## 2021-05-04 DIAGNOSIS — E66.9 OBESITY, UNSPECIFIED: ICD-10-CM

## 2021-05-04 DIAGNOSIS — I10 ESSENTIAL (PRIMARY) HYPERTENSION: ICD-10-CM

## 2021-05-04 DIAGNOSIS — M10.9 GOUT, UNSPECIFIED: ICD-10-CM

## 2021-05-06 ENCOUNTER — APPOINTMENT (OUTPATIENT)
Dept: NEPHROLOGY | Facility: CLINIC | Age: 46
End: 2021-05-06
Payer: COMMERCIAL

## 2021-05-06 VITALS — BODY MASS INDEX: 32.74 KG/M2 | TEMPERATURE: 96.9 F | HEART RATE: 85 BPM | WEIGHT: 255 LBS | OXYGEN SATURATION: 98 %

## 2021-05-06 VITALS — SYSTOLIC BLOOD PRESSURE: 131 MMHG | DIASTOLIC BLOOD PRESSURE: 85 MMHG | HEART RATE: 68 BPM

## 2021-05-06 VITALS — SYSTOLIC BLOOD PRESSURE: 134 MMHG | DIASTOLIC BLOOD PRESSURE: 85 MMHG | HEART RATE: 72 BPM

## 2021-05-06 VITALS — SYSTOLIC BLOOD PRESSURE: 125 MMHG | DIASTOLIC BLOOD PRESSURE: 78 MMHG | HEART RATE: 64 BPM

## 2021-05-06 PROBLEM — I10 ESSENTIAL (PRIMARY) HYPERTENSION: Chronic | Status: ACTIVE | Noted: 2021-04-28

## 2021-05-06 PROCEDURE — 36415 COLL VENOUS BLD VENIPUNCTURE: CPT

## 2021-05-06 PROCEDURE — 99214 OFFICE O/P EST MOD 30 MIN: CPT | Mod: 25

## 2021-05-06 PROCEDURE — 99072 ADDL SUPL MATRL&STAF TM PHE: CPT

## 2021-05-06 NOTE — HISTORY OF PRESENT ILLNESS
[FreeTextEntry1] : The patient is a 46 year old male presenting for f/u evaluation s/p admission to [facility] for cellulitis to the right elbow, and active reassessment of HTN, gout, cervical radiculopathy with chronic back pain, proteinuria, microhematuria, and CRI.\par \par The patient presents today s/p admission to [facility] for cellulitis. He was originally given an outpatient regimen of Keflex 500mg Q12H without improvement and he presented to [facility] ED on 4/28/29 and admitted for cellulitis.  Patient received IV vancomycin during admission and given course of PO cefadroxil 500mg BID x7 days and doxycycline 100mg BID x7 days. Patient discharged on 4/29/21. \par BP in hospital was reportedly acceptable and stable throughout admission. He denies known fevers and felt otherwise fine.\par Today the patient feels generally well and reports resolution of his symptoms post discharge. He will return to work on 5/19/21. He has no other new medical complaints.\par \par Current medications:  allopurinol 50mg QD, amlodipine 5mg QD, clonidine patch #3.\par Today is the last day of patient's antibiotic course of cefadroxil 500mg Q12H and doxycycline 100mg Q12H.

## 2021-05-06 NOTE — ASSESSMENT
[FreeTextEntry1] : Plan:\par 1) Cellulitis s/p discharge: Patient discharged from [facility] on 4/29/21 s/p admission on 4/28/21 for right elbow cellulitis. Patient received IV vancomycin and discharged the next day with outpatient 7 day course of cefadroxil and doxycycline which today is the last day of this regimen. Patient will finish antibiotic course today and continue to f/u as advised.\par 2) HTN: BP acceptable today on current regimen and therefore will not adjust patient's antihypertensive medications. Will reassess pressure and regimen at next evaluation. \par 3) Renal function: last creatinine of 1.18 with eGFR of 50 is stable for patient. Continue to monitor on repeat CMP. \par 4) COVID-19 Vaccination: Patient has reportedly received both doses of a Covid19 vaccine. Advise patient continue precautions at this time including wearing masks, social distancing, and avoiding populated public spaces. \par \par Changes to Medications: finish course of antibiotics, otherwise no change to medications. \par \par Labs were drawn and patient will return for a follow-up appointment in 6-8 weeks in the office.

## 2021-05-06 NOTE — ADDENDUM
[FreeTextEntry1] :  Documented by Gilberto Ferguson acting as a scribe for Dr. Devon Gutierrez on 05/06/2021.

## 2021-05-06 NOTE — END OF VISIT
[Time Spent: ___ minutes] : I have spent [unfilled] minutes of time on the encounter. [FreeTextEntry3] : All medical record entries made by the Scribe were at my, Dr. Devon Gutierrez, direction and personally dictated by me on 05/06/2021. I have reviewed the chart and agree that the record accurately reflects my personal performance of the history, physical exam, assessment and plan. I have also personally directed, reviewed, and agreed with the chart.

## 2021-05-09 LAB
ALBUMIN SERPL ELPH-MCNC: 4.3 G/DL
ALP BLD-CCNC: 88 U/L
ALT SERPL-CCNC: 34 U/L
ANION GAP SERPL CALC-SCNC: 11 MMOL/L
APPEARANCE: CLEAR
AST SERPL-CCNC: 40 U/L
BACTERIA: NEGATIVE
BASOPHILS # BLD AUTO: 0.04 K/UL
BASOPHILS NFR BLD AUTO: 0.5 %
BILIRUB SERPL-MCNC: 0.5 MG/DL
BILIRUBIN URINE: NEGATIVE
BLOOD URINE: NEGATIVE
BUN SERPL-MCNC: 19 MG/DL
CALCIUM SERPL-MCNC: 9.8 MG/DL
CHLORIDE SERPL-SCNC: 107 MMOL/L
CHOLEST SERPL-MCNC: 163 MG/DL
CO2 SERPL-SCNC: 23 MMOL/L
COLOR: NORMAL
CREAT SERPL-MCNC: 1.28 MG/DL
EOSINOPHIL # BLD AUTO: 0.11 K/UL
EOSINOPHIL NFR BLD AUTO: 1.4 %
ERYTHROCYTE [SEDIMENTATION RATE] IN BLOOD BY WESTERGREN METHOD: 35 MM/HR
GLUCOSE QUALITATIVE U: NEGATIVE
GLUCOSE SERPL-MCNC: 85 MG/DL
HCT VFR BLD CALC: 42.9 %
HDLC SERPL-MCNC: 35 MG/DL
HGB BLD-MCNC: 13.8 G/DL
HYALINE CASTS: 0 /LPF
IMM GRANULOCYTES NFR BLD AUTO: 0.3 %
KETONES URINE: NEGATIVE
LDLC SERPL CALC-MCNC: 98 MG/DL
LEUKOCYTE ESTERASE URINE: NEGATIVE
LYMPHOCYTES # BLD AUTO: 2.39 K/UL
LYMPHOCYTES NFR BLD AUTO: 30.4 %
MAGNESIUM SERPL-MCNC: 2 MG/DL
MAN DIFF?: NORMAL
MCHC RBC-ENTMCNC: 29.2 PG
MCHC RBC-ENTMCNC: 32.2 GM/DL
MCV RBC AUTO: 90.7 FL
MICROSCOPIC-UA: NORMAL
MONOCYTES # BLD AUTO: 0.71 K/UL
MONOCYTES NFR BLD AUTO: 9 %
NEUTROPHILS # BLD AUTO: 4.58 K/UL
NEUTROPHILS NFR BLD AUTO: 58.4 %
NITRITE URINE: NEGATIVE
NONHDLC SERPL-MCNC: 128 MG/DL
PH URINE: 5
PHOSPHATE SERPL-MCNC: 3.5 MG/DL
PLATELET # BLD AUTO: 333 K/UL
POTASSIUM SERPL-SCNC: 4.5 MMOL/L
PROT SERPL-MCNC: 7.9 G/DL
PROTEIN URINE: NEGATIVE
RBC # BLD: 4.73 M/UL
RBC # FLD: 13.8 %
RED BLOOD CELLS URINE: 0 /HPF
SODIUM SERPL-SCNC: 141 MMOL/L
SPECIFIC GRAVITY URINE: 1.01
SQUAMOUS EPITHELIAL CELLS: 0 /HPF
TRIGL SERPL-MCNC: 150 MG/DL
URATE SERPL-MCNC: 9.1 MG/DL
UROBILINOGEN URINE: NORMAL
WBC # FLD AUTO: 7.85 K/UL
WHITE BLOOD CELLS URINE: 0 /HPF

## 2021-05-28 ENCOUNTER — RX CHANGE (OUTPATIENT)
Age: 46
End: 2021-05-28

## 2021-05-28 RX ORDER — AMLODIPINE BESYLATE 5 MG/1
5 TABLET ORAL
Qty: 180 | Refills: 3 | Status: DISCONTINUED | COMMUNITY
Start: 2020-09-17 | End: 2021-05-28

## 2021-06-23 PROCEDURE — 99285 EMERGENCY DEPT VISIT HI MDM: CPT

## 2021-06-23 PROCEDURE — 87389 HIV-1 AG W/HIV-1&-2 AB AG IA: CPT

## 2021-06-23 PROCEDURE — 83735 ASSAY OF MAGNESIUM: CPT

## 2021-06-23 PROCEDURE — U0005: CPT

## 2021-06-23 PROCEDURE — 86850 RBC ANTIBODY SCREEN: CPT

## 2021-06-23 PROCEDURE — 80053 COMPREHEN METABOLIC PANEL: CPT

## 2021-06-23 PROCEDURE — 85610 PROTHROMBIN TIME: CPT

## 2021-06-23 PROCEDURE — 86901 BLOOD TYPING SEROLOGIC RH(D): CPT

## 2021-06-23 PROCEDURE — 86140 C-REACTIVE PROTEIN: CPT

## 2021-06-23 PROCEDURE — 85730 THROMBOPLASTIN TIME PARTIAL: CPT

## 2021-06-23 PROCEDURE — 36415 COLL VENOUS BLD VENIPUNCTURE: CPT

## 2021-06-23 PROCEDURE — 86769 SARS-COV-2 COVID-19 ANTIBODY: CPT

## 2021-06-23 PROCEDURE — 87040 BLOOD CULTURE FOR BACTERIA: CPT

## 2021-06-23 PROCEDURE — 86900 BLOOD TYPING SEROLOGIC ABO: CPT

## 2021-06-23 PROCEDURE — U0003: CPT

## 2021-06-23 PROCEDURE — 84100 ASSAY OF PHOSPHORUS: CPT

## 2021-06-23 PROCEDURE — 85025 COMPLETE CBC W/AUTO DIFF WBC: CPT

## 2021-06-23 PROCEDURE — 85652 RBC SED RATE AUTOMATED: CPT

## 2021-06-24 ENCOUNTER — LABORATORY RESULT (OUTPATIENT)
Age: 46
End: 2021-06-24

## 2021-06-24 ENCOUNTER — APPOINTMENT (OUTPATIENT)
Dept: NEPHROLOGY | Facility: CLINIC | Age: 46
End: 2021-06-24
Payer: COMMERCIAL

## 2021-06-24 VITALS — HEART RATE: 72 BPM | SYSTOLIC BLOOD PRESSURE: 116 MMHG | DIASTOLIC BLOOD PRESSURE: 60 MMHG

## 2021-06-24 VITALS — HEART RATE: 96 BPM | DIASTOLIC BLOOD PRESSURE: 70 MMHG | SYSTOLIC BLOOD PRESSURE: 120 MMHG

## 2021-06-24 VITALS — HEART RATE: 104 BPM | WEIGHT: 255 LBS | TEMPERATURE: 98.2 F | OXYGEN SATURATION: 98 % | BODY MASS INDEX: 32.74 KG/M2

## 2021-06-24 VITALS — HEART RATE: 84 BPM | DIASTOLIC BLOOD PRESSURE: 76 MMHG | SYSTOLIC BLOOD PRESSURE: 118 MMHG

## 2021-06-24 DIAGNOSIS — R74.8 ABNORMAL LEVELS OF OTHER SERUM ENZYMES: ICD-10-CM

## 2021-06-24 PROCEDURE — 36415 COLL VENOUS BLD VENIPUNCTURE: CPT

## 2021-06-24 PROCEDURE — 99214 OFFICE O/P EST MOD 30 MIN: CPT | Mod: 25

## 2021-06-24 PROCEDURE — 99072 ADDL SUPL MATRL&STAF TM PHE: CPT

## 2021-06-24 NOTE — ASSESSMENT
[FreeTextEntry1] : Plan:\par 1) HTN: BP well-controlled today on current regimen. Will therefore continue patient's current antihypertensive medications and will reassess pressure and regimen at next evaluation. Have discussed with patient that he will likely need to remain on an antihypertensive regimen for the foreseeable future in view of his age, FMHx, and h/o glomerular nephritis.\par 2) CRI: last creatinine of 1.28, eGFR of 76 is stable for patient; will continue to monitor function with CMP due to risk for progression of renal failure, and will maintain current therapy at this time.\par 3) Abnormal LFTs: Advised patient to limit protein, carbohydrate, and alcohol intake, and to lose weight, to avoid progression of fatty change.\par 4) H/o cellulitis: Now resolved. Will continue to monitor.\par 5) Prediabetes: Last HbA1c of 5.9 is borderline elevated. Advised patient to increase exercise regimen through walking 2-4 miles daily, as tolerated and weather permitting, along with modest weight loss and avoidance of excessive sweets with goal of lowering HbA1c.\par \par No changes to medications.\par \par Labs were drawn and patient will return in 2 months for a follow-up appointment.

## 2021-06-24 NOTE — PHYSICAL EXAM
[General Appearance - Alert] : alert [General Appearance - In No Acute Distress] : in no acute distress [Sclera] : the sclera and conjunctiva were normal [PERRL With Normal Accommodation] : pupils were equal in size, round, and reactive to light [Extraocular Movements] : extraocular movements were intact [Outer Ear] : the ears and nose were normal in appearance [Hearing Threshold Finger Rub Not [place]] : hearing was normal [Examination Of The Oral Cavity] : the lips and gums were normal [Neck Appearance] : the appearance of the neck was normal [Neck Cervical Mass (___cm)] : no neck mass was observed [Jugular Venous Distention Increased] : there was no jugular-venous distention [Thyroid Diffuse Enlargement] : the thyroid was not enlarged [Thyroid Nodule] : there were no palpable thyroid nodules [Auscultation Breath Sounds / Voice Sounds] : lungs were clear to auscultation bilaterally [Heart Rate And Rhythm] : heart rate was normal and rhythm regular [Heart Sounds] : normal S1 and S2 [Heart Sounds Gallop] : no gallops [Murmurs] : no murmurs [Heart Sounds Pericardial Friction Rub] : no pericardial rub [Edema] : there was no peripheral edema [Bowel Sounds] : normal bowel sounds [Abdomen Soft] : soft [Abdomen Tenderness] : non-tender [Abdomen Mass (___ Cm)] : no abdominal mass palpated [No CVA Tenderness] : no ~M costovertebral angle tenderness [No Spinal Tenderness] : no spinal tenderness [Abnormal Walk] : normal gait [Involuntary Movements] : no involuntary movements were seen [Musculoskeletal - Swelling] : no joint swelling seen [Motor Tone] : muscle strength and tone were normal [Skin Color & Pigmentation] : normal skin color and pigmentation [Skin Turgor] : normal skin turgor [] : no rash [No Focal Deficits] : no focal deficits [Oriented To Time, Place, And Person] : oriented to person, place, and time [Impaired Insight] : insight and judgment were intact [Affect] : the affect was normal

## 2021-06-24 NOTE — END OF VISIT
[Time Spent: ___ minutes] : I have spent [unfilled] minutes of time on the encounter. [FreeTextEntry3] : Documented by Dm Garcia acting as a scribe for Dr. Devon Gutierrez on 06/24/2021.

## 2021-06-24 NOTE — ADDENDUM
[FreeTextEntry1] : All medical record entries made by the Scribe were at my, Dr. Devon Gutierrez, direction and personally dictated by me on 06/24/2021. I have reviewed the chart and agree that the record accurately reflects my personal performance of the history, physical exam, assessment and plan. I have also personally directed, reviewed, and agreed with the chart.

## 2021-06-24 NOTE — HISTORY OF PRESENT ILLNESS
[FreeTextEntry1] : The patient is a 46 year old male presenting for f/u evaluation and active reassessment of HTN, gout, cervical radiculopathy with chronic back pain, h/o post-strep GN, h/o cellulitis, proteinuria, microhematuria, and CRI.\par \par The patient is feeling generally well today and denies any new medical complaints. He states he has not followed up for back pain evaluation as it is somewhat manageable at the moment and he does not wish to receive general anaesthesia. He denies any CP or SOB.\par - Reviewed most recent labs in detail with the patient today.\par \par Labs taken 5/6/21 reveal: triglycerides 150, HDL 35, LDL 98, creatinine 1.28, eGFR 66, sed rate 35\par \par Current medications: allopurinol 50mg QD, amlodipine 5mg QD, clonidine patch #3.

## 2021-06-29 LAB
24R-OH-CALCIDIOL SERPL-MCNC: 52.1 PG/ML
25(OH)D3 SERPL-MCNC: 28.9 NG/ML
ALBUMIN SERPL ELPH-MCNC: 4.6 G/DL
ALDOSTERONE SERUM: 13.9 NG/DL
ALP BLD-CCNC: 83 U/L
ALP BONE SERPL-MCNC: 21.7 UG/L
ALT SERPL-CCNC: 47 U/L
ANION GAP SERPL CALC-SCNC: 14 MMOL/L
APPEARANCE: CLEAR
AST SERPL-CCNC: 64 U/L
BACTERIA: NEGATIVE
BASOPHILS # BLD AUTO: 0.03 K/UL
BASOPHILS NFR BLD AUTO: 0.5 %
BILIRUB SERPL-MCNC: 0.4 MG/DL
BILIRUBIN URINE: NEGATIVE
BLOOD URINE: NEGATIVE
BUN SERPL-MCNC: 23 MG/DL
C3 SERPL-MCNC: 138 MG/DL
C4 SERPL-MCNC: 23 MG/DL
CALCIUM SERPL-MCNC: 9.9 MG/DL
CALCIUM SERPL-MCNC: 9.9 MG/DL
CHLORIDE SERPL-SCNC: 108 MMOL/L
CHOLEST SERPL-MCNC: 160 MG/DL
CO2 SERPL-SCNC: 20 MMOL/L
COLLAGEN CTX SERPL-MCNC: 278 PG/ML
COLOR: COLORLESS
COVID-19 NUCLEOCAPSID  GAM ANTIBODY INTERPRETATION: NEGATIVE
COVID-19 SPIKE DOMAIN ANTIBODY INTERPRETATION: POSITIVE
CREAT SERPL-MCNC: 1.46 MG/DL
CREAT SPEC-SCNC: 90 MG/DL
CREAT/PROT UR: 0.1 RATIO
CRP SERPL-MCNC: <3 MG/L
EOSINOPHIL # BLD AUTO: 0.06 K/UL
EOSINOPHIL NFR BLD AUTO: 0.9 %
ERYTHROCYTE [SEDIMENTATION RATE] IN BLOOD BY WESTERGREN METHOD: 25 MM/HR
ESTIMATED AVERAGE GLUCOSE: 120 MG/DL
FERRITIN SERPL-MCNC: 69 NG/ML
FOLATE SERPL-MCNC: 12.9 NG/ML
GGT SERPL-CCNC: 24 U/L
GLUCOSE QUALITATIVE U: NEGATIVE
GLUCOSE SERPL-MCNC: 84 MG/DL
HBA1C MFR BLD HPLC: 5.8 %
HBV SURFACE AB SER QL: NONREACTIVE
HBV SURFACE AG SER QL: NONREACTIVE
HCT VFR BLD CALC: 42.6 %
HCV AB SER QL: NONREACTIVE
HCV S/CO RATIO: 0.22 S/CO
HCYS SERPL-MCNC: 19.9 UMOL/L
HDLC SERPL-MCNC: 33 MG/DL
HGB BLD-MCNC: 14.1 G/DL
HYALINE CASTS: 0 /LPF
IMM GRANULOCYTES NFR BLD AUTO: 0.2 %
IRON SATN MFR SERPL: 13 %
IRON SERPL-MCNC: 42 UG/DL
KETONES URINE: NEGATIVE
LDLC SERPL CALC-MCNC: 74 MG/DL
LEUKOCYTE ESTERASE URINE: NEGATIVE
LYMPHOCYTES # BLD AUTO: 2.33 K/UL
LYMPHOCYTES NFR BLD AUTO: 35.5 %
MAGNESIUM SERPL-MCNC: 2.2 MG/DL
MAN DIFF?: NORMAL
MCHC RBC-ENTMCNC: 29.3 PG
MCHC RBC-ENTMCNC: 33.1 GM/DL
MCV RBC AUTO: 88.6 FL
MICROSCOPIC-UA: NORMAL
MONOCYTES # BLD AUTO: 0.53 K/UL
MONOCYTES NFR BLD AUTO: 8.1 %
MPO AB + PR3 PNL SER: NORMAL
NEUTROPHILS # BLD AUTO: 3.6 K/UL
NEUTROPHILS NFR BLD AUTO: 54.8 %
NITRITE URINE: NEGATIVE
NONHDLC SERPL-MCNC: 126 MG/DL
PARATHYROID HORMONE INTACT: 28 PG/ML
PH URINE: 5
PHOSPHATE SERPL-MCNC: 2.9 MG/DL
PLATELET # BLD AUTO: 276 K/UL
POTASSIUM SERPL-SCNC: 4.2 MMOL/L
PROT SERPL-MCNC: 8 G/DL
PROT UR-MCNC: 8 MG/DL
PROTEIN URINE: NEGATIVE
RBC # BLD: 4.81 M/UL
RBC # FLD: 13.7 %
RED BLOOD CELLS URINE: 0 /HPF
RENIN PLASMA: 22 PG/ML
RHEUMATOID FACT SER QL: <10 IU/ML
SARS-COV-2 AB SERPL IA-ACNC: >250 U/ML
SARS-COV-2 AB SERPL QL IA: 0.09 INDEX
SODIUM SERPL-SCNC: 142 MMOL/L
SPECIFIC GRAVITY URINE: 1.01
SQUAMOUS EPITHELIAL CELLS: 0 /HPF
T3FREE SERPL-MCNC: 3.26 PG/ML
T3RU NFR SERPL: 0.9 TBI
T4 FREE SERPL-MCNC: 1.1 NG/DL
T4 SERPL-MCNC: 5 UG/DL
THYROGLOB AB SERPL-ACNC: <20 IU/ML
THYROPEROXIDASE AB SERPL IA-ACNC: <10 IU/ML
TIBC SERPL-MCNC: 330 UG/DL
TRIGL SERPL-MCNC: 263 MG/DL
TSH SERPL-ACNC: 0.93 UIU/ML
UIBC SERPL-MCNC: 288 UG/DL
URATE SERPL-MCNC: 10.4 MG/DL
UROBILINOGEN URINE: NORMAL
VIT B12 SERPL-MCNC: 508 PG/ML
WBC # FLD AUTO: 6.56 K/UL
WHITE BLOOD CELLS URINE: 0 /HPF

## 2021-07-02 RX ORDER — AMLODIPINE BESYLATE 5 MG/1
5 TABLET ORAL
Qty: 180 | Refills: 3 | Status: ACTIVE | COMMUNITY
Start: 2021-05-28 | End: 1900-01-01

## 2021-07-04 LAB
ALBUMIN MFR SERPL ELPH: 56.8 %
ALBUMIN SERPL-MCNC: 4.5 G/DL
ALBUMIN/GLOB SERPL: 1.3 RATIO
ALPHA1 GLOB MFR SERPL ELPH: 2.9 %
ALPHA1 GLOB SERPL ELPH-MCNC: 0.2 G/DL
ALPHA2 GLOB MFR SERPL ELPH: 7.4 %
ALPHA2 GLOB SERPL ELPH-MCNC: 0.6 G/DL
ANA SER IF-ACNC: NEGATIVE
B-GLOBULIN MFR SERPL ELPH: 11.3 %
B-GLOBULIN SERPL ELPH-MCNC: 0.9 G/DL
DEPRECATED KAPPA LC FREE/LAMBDA SER: 2.56 RATIO
GAMMA GLOB FLD ELPH-MCNC: 1.7 G/DL
GAMMA GLOB MFR SERPL ELPH: 21.6 %
IGA SER QL IEP: 188 MG/DL
IGG SER QL IEP: 1934 MG/DL
IGM SER QL IEP: 88 MG/DL
INTERPRETATION SERPL IEP-IMP: NORMAL
KAPPA LC CSF-MCNC: 2.19 MG/DL
KAPPA LC SERPL-MCNC: 5.61 MG/DL
M PROTEIN SPEC IFE-MCNC: NORMAL
METHYLMALONATE SERPL-SCNC: 171 NMOL/L
PROT SERPL-MCNC: 8 G/DL
PROT SERPL-MCNC: 8 G/DL

## 2021-08-09 RX ORDER — ALLOPURINOL 100 MG/1
100 TABLET ORAL
Qty: 15 | Refills: 1 | Status: ACTIVE | COMMUNITY
Start: 2020-09-04 | End: 1900-01-01

## 2021-08-11 RX ORDER — CLONIDINE 0.3 MG/24H
0.3 PATCH, EXTENDED RELEASE TRANSDERMAL
Qty: 13 | Refills: 3 | Status: ACTIVE | COMMUNITY
Start: 2020-10-29 | End: 1900-01-01

## 2021-08-30 ENCOUNTER — LABORATORY RESULT (OUTPATIENT)
Age: 46
End: 2021-08-30

## 2021-08-30 ENCOUNTER — APPOINTMENT (OUTPATIENT)
Dept: NEPHROLOGY | Facility: CLINIC | Age: 46
End: 2021-08-30
Payer: COMMERCIAL

## 2021-08-30 ENCOUNTER — APPOINTMENT (OUTPATIENT)
Dept: NEPHROLOGY | Facility: CLINIC | Age: 46
End: 2021-08-30

## 2021-08-30 VITALS — SYSTOLIC BLOOD PRESSURE: 110 MMHG | DIASTOLIC BLOOD PRESSURE: 60 MMHG | HEART RATE: 60 BPM

## 2021-08-30 VITALS — SYSTOLIC BLOOD PRESSURE: 120 MMHG | DIASTOLIC BLOOD PRESSURE: 70 MMHG

## 2021-08-30 VITALS — SYSTOLIC BLOOD PRESSURE: 90 MMHG | DIASTOLIC BLOOD PRESSURE: 60 MMHG | HEART RATE: 84 BPM

## 2021-08-30 PROCEDURE — 36415 COLL VENOUS BLD VENIPUNCTURE: CPT

## 2021-08-30 PROCEDURE — 99215 OFFICE O/P EST HI 40 MIN: CPT | Mod: 25

## 2021-08-30 NOTE — HISTORY OF PRESENT ILLNESS
[FreeTextEntry1] : The patient is a 46 year old male presenting for f/u evaluation and active reassessment of HTN, gout, cervical radiculopathy with chronic back pain, h/o post-strep GN, proteinuria, microhematuria, and CRI.\par \par The patient reports left posterior flank pain, along with numbness of his fingers at night, right > left; he saw Dr. Nelson Frankel in orthopaedics for evaluation earlier today and was planned to be started on meloxicam and Flexeril regimen.\par \par Labs 6/24/21: sed rate 25, HbA1c 5.8, positive COVID spike domain antibody, Vit D 25-OH 28.9, triglycerides 263, HDL 33, LDL 74, CO2 of 20, creatinine 1.46, AST 64, ALT 47, eGFR 65\par \par Current medications: allopurinol 50mg QD, amlodipine 5mg QD, clonidine patch #3.

## 2021-08-30 NOTE — ASSESSMENT
[FreeTextEntry1] : Plan:\par 1) HTN: The patient is hypotensive upon standing in office today (90 systolic). Will d/c amlodipine and will reassess pressure and regimen at next evaluation.\par 2) CRI: last creatinine of 1.46, eGFR of 65; explained to patient that his excreted creatinine is a function of several factors, including race, muscle mass, and age. Will obtain 24-hour urine to measure creatinine and protein output and will continue to monitor function with CMP due to risk for progression of renal failure. Advised against NSAID use, including ibuprofen and meloxicam, in view of potential for BP elevation and nephrotoxicity.\par 3) Abnormal LFTs: Advised patient to limit protein, carbohydrate, and alcohol intake, and to lose weight, to avoid progression of fatty change.\par 4) Prediabetes: Last HbA1c of 5.8 is borderline elevated. Advised patient to increase exercise regimen, as tolerated and weather permitting, along with modest weight loss and avoidance of excessive sweets with goal of lowering HbA1c.\par 5) Lower back pain / numbness: Will contact Dr. Frankel ([phone]) to discuss patient and meloxicam regimen further; hold meloxicam for now as above. Patient to take Flexeril and Tylenol for pain for the time being. May consider referral to neurology or neurosurgery. May consider MRI C-spine and use of gabapentin.\par 6) Advised patient to receive a COVID vaccine booster dose 8 months from the time of the most recent vaccine dose received. (September 2021)\par \par Changes to medications: d/c amlodipine, hold meloxicam\par \par Labs were drawn and patient will return in 3 weeks for a follow-up appointment.

## 2021-08-30 NOTE — ADDENDUM
[FreeTextEntry1] : All medical record entries made by the Scribe were at my, Dr. Devon Gutierrez, direction and personally dictated by me on 08/30/2021. I have reviewed the chart and agree that the record accurately reflects my personal performance of the history, physical exam, assessment and plan. I have also personally directed, reviewed, and agreed with the chart.

## 2021-08-30 NOTE — END OF VISIT
[Time Spent: ___ minutes] : I have spent [unfilled] minutes of time on the encounter. [FreeTextEntry3] : Documented by Dm Garcia acting as a scribe for Dr. Devon Gutierrez on 08/30/2021.

## 2021-08-30 NOTE — PHYSICAL EXAM
[General Appearance - In No Acute Distress] : in no acute distress [General Appearance - Alert] : alert [Sclera] : the sclera and conjunctiva were normal [PERRL With Normal Accommodation] : pupils were equal in size, round, and reactive to light [Extraocular Movements] : extraocular movements were intact [Outer Ear] : the ears and nose were normal in appearance [Hearing Threshold Finger Rub Not [place]] : hearing was normal [Examination Of The Oral Cavity] : the lips and gums were normal [Neck Appearance] : the appearance of the neck was normal [Neck Cervical Mass (___cm)] : no neck mass was observed [Jugular Venous Distention Increased] : there was no jugular-venous distention [Thyroid Diffuse Enlargement] : the thyroid was not enlarged [Thyroid Nodule] : there were no palpable thyroid nodules [Auscultation Breath Sounds / Voice Sounds] : lungs were clear to auscultation bilaterally [Heart Rate And Rhythm] : heart rate was normal and rhythm regular [Heart Sounds] : normal S1 and S2 [Heart Sounds Gallop] : no gallops [Murmurs] : no murmurs [Heart Sounds Pericardial Friction Rub] : no pericardial rub [Edema] : there was no peripheral edema [Bowel Sounds] : normal bowel sounds [Abdomen Soft] : soft [Abdomen Tenderness] : non-tender [Abdomen Mass (___ Cm)] : no abdominal mass palpated [No CVA Tenderness] : no ~M costovertebral angle tenderness [No Spinal Tenderness] : no spinal tenderness [Abnormal Walk] : normal gait [Involuntary Movements] : no involuntary movements were seen [Musculoskeletal - Swelling] : no joint swelling seen [Motor Tone] : muscle strength and tone were normal [Skin Color & Pigmentation] : normal skin color and pigmentation [Skin Turgor] : normal skin turgor [] : no rash [No Focal Deficits] : no focal deficits [Oriented To Time, Place, And Person] : oriented to person, place, and time [Impaired Insight] : insight and judgment were intact [Affect] : the affect was normal

## 2021-09-03 LAB
ALBUMIN SERPL ELPH-MCNC: 4.6 G/DL
ALP BLD-CCNC: 95 U/L
ALT SERPL-CCNC: 33 U/L
ANA SER IF-ACNC: NEGATIVE
ANION GAP SERPL CALC-SCNC: 13 MMOL/L
APPEARANCE: CLEAR
AST SERPL-CCNC: 38 U/L
BACTERIA: NEGATIVE
BASOPHILS # BLD AUTO: 0.04 K/UL
BASOPHILS NFR BLD AUTO: 0.5 %
BILIRUB SERPL-MCNC: 1 MG/DL
BILIRUBIN URINE: NEGATIVE
BLOOD URINE: NEGATIVE
BUN SERPL-MCNC: 13 MG/DL
C3 SERPL-MCNC: 150 MG/DL
C4 SERPL-MCNC: 28 MG/DL
CALCIUM SERPL-MCNC: 9.6 MG/DL
CHLORIDE SERPL-SCNC: 107 MMOL/L
CHOLEST SERPL-MCNC: 155 MG/DL
CO2 SERPL-SCNC: 22 MMOL/L
COLOR: YELLOW
COVID-19 NUCLEOCAPSID  GAM ANTIBODY INTERPRETATION: NEGATIVE
COVID-19 SPIKE DOMAIN ANTIBODY INTERPRETATION: POSITIVE
CREAT SERPL-MCNC: 1.22 MG/DL
CREAT SPEC-SCNC: 212 MG/DL
CREAT/PROT UR: 0.1 RATIO
CRP SERPL-MCNC: 3 MG/L
CYSTATIN C SERPL-MCNC: 1 MG/L
EOSINOPHIL # BLD AUTO: 0.06 K/UL
EOSINOPHIL NFR BLD AUTO: 0.8 %
ERYTHROCYTE [SEDIMENTATION RATE] IN BLOOD BY WESTERGREN METHOD: 26 MM/HR
ESTIMATED AVERAGE GLUCOSE: 114 MG/DL
FERRITIN SERPL-MCNC: 65 NG/ML
FOLATE SERPL-MCNC: 18.2 NG/ML
GFR/BSA.PRED SERPLBLD CYS-BASED-ARV: 82 ML/MIN
GLUCOSE QUALITATIVE U: NEGATIVE
GLUCOSE SERPL-MCNC: 86 MG/DL
HBA1C MFR BLD HPLC: 5.6 %
HBV SURFACE AB SER QL: NONREACTIVE
HBV SURFACE AG SER QL: NONREACTIVE
HCT VFR BLD CALC: 44.5 %
HCV AB SER QL: NONREACTIVE
HCV S/CO RATIO: 0.23 S/CO
HCYS SERPL-MCNC: 8.3 UMOL/L
HDLC SERPL-MCNC: 35 MG/DL
HGB BLD-MCNC: 14.9 G/DL
HYALINE CASTS: 1 /LPF
IMM GRANULOCYTES NFR BLD AUTO: 0.1 %
IRON SATN MFR SERPL: 23 %
IRON SERPL-MCNC: 73 UG/DL
KETONES URINE: NEGATIVE
LDLC SERPL CALC-MCNC: 98 MG/DL
LEUKOCYTE ESTERASE URINE: NEGATIVE
LYMPHOCYTES # BLD AUTO: 2.48 K/UL
LYMPHOCYTES NFR BLD AUTO: 31.6 %
MAGNESIUM SERPL-MCNC: 2 MG/DL
MAN DIFF?: NORMAL
MCHC RBC-ENTMCNC: 30.1 PG
MCHC RBC-ENTMCNC: 33.5 GM/DL
MCV RBC AUTO: 89.9 FL
METHYLMALONATE SERPL-SCNC: 81 NMOL/L
MICROSCOPIC-UA: NORMAL
MONOCYTES # BLD AUTO: 0.53 K/UL
MONOCYTES NFR BLD AUTO: 6.8 %
MPO AB + PR3 PNL SER: NORMAL
NEUTROPHILS # BLD AUTO: 4.72 K/UL
NEUTROPHILS NFR BLD AUTO: 60.2 %
NITRITE URINE: NEGATIVE
NONHDLC SERPL-MCNC: 120 MG/DL
PH URINE: 5
PHOSPHATE SERPL-MCNC: 3.6 MG/DL
PLATELET # BLD AUTO: 274 K/UL
POTASSIUM SERPL-SCNC: 4.2 MMOL/L
PROT SERPL-MCNC: 7.5 G/DL
PROT UR-MCNC: 15 MG/DL
PROTEIN URINE: NORMAL
RBC # BLD: 4.95 M/UL
RBC # FLD: 13.2 %
RED BLOOD CELLS URINE: 1 /HPF
RHEUMATOID FACT SER QL: <10 IU/ML
SARS-COV-2 AB SERPL IA-ACNC: >250 U/ML
SARS-COV-2 AB SERPL QL IA: 0.09 INDEX
SODIUM SERPL-SCNC: 142 MMOL/L
SPECIFIC GRAVITY URINE: 1.02
SQUAMOUS EPITHELIAL CELLS: 0 /HPF
T3FREE SERPL-MCNC: 2.85 PG/ML
T3RU NFR SERPL: 0.9 TBI
T4 FREE SERPL-MCNC: 1.3 NG/DL
T4 SERPL-MCNC: 5.9 UG/DL
THYROGLOB AB SERPL-ACNC: <20 IU/ML
THYROPEROXIDASE AB SERPL IA-ACNC: <10 IU/ML
TIBC SERPL-MCNC: 319 UG/DL
TRIGL SERPL-MCNC: 107 MG/DL
TSH SERPL-ACNC: 1.6 UIU/ML
UIBC SERPL-MCNC: 246 UG/DL
URATE SERPL-MCNC: 8.5 MG/DL
UROBILINOGEN URINE: NORMAL
VIT B12 SERPL-MCNC: 1620 PG/ML
WBC # FLD AUTO: 7.84 K/UL
WHITE BLOOD CELLS URINE: 0 /HPF

## 2021-09-06 LAB
ALBUMIN MFR SERPL ELPH: 55.3 %
ALBUMIN SERPL-MCNC: 4.1 G/DL
ALBUMIN/GLOB SERPL: 1.2 RATIO
ALPHA1 GLOB MFR SERPL ELPH: 3.2 %
ALPHA1 GLOB SERPL ELPH-MCNC: 0.2 G/DL
ALPHA2 GLOB MFR SERPL ELPH: 9.1 %
ALPHA2 GLOB SERPL ELPH-MCNC: 0.7 G/DL
B-GLOBULIN MFR SERPL ELPH: 10.9 %
B-GLOBULIN SERPL ELPH-MCNC: 0.8 G/DL
DEPRECATED KAPPA LC FREE/LAMBDA SER: 2.06 RATIO
GAMMA GLOB FLD ELPH-MCNC: 1.6 G/DL
GAMMA GLOB MFR SERPL ELPH: 21.5 %
IGA SER QL IEP: 187 MG/DL
IGG SER QL IEP: 1957 MG/DL
IGM SER QL IEP: 85 MG/DL
INTERPRETATION SERPL IEP-IMP: NORMAL
KAPPA LC CSF-MCNC: 1.97 MG/DL
KAPPA LC SERPL-MCNC: 4.05 MG/DL
M PROTEIN SPEC IFE-MCNC: NORMAL
PROT SERPL-MCNC: 7.5 G/DL
PROT SERPL-MCNC: 7.5 G/DL

## 2021-09-15 NOTE — ED ADULT NURSE NOTE - TEMPLATE
Assessment/Plan:       No problem-specific Assessment & Plan notes found for this encounter  Diagnoses and all orders for this visit:    Cough  Comments:  Resolved  Keep appointment with allergist    Wheezing  Comments:  Resolved  Keep appointment with allergist for further evaluation    Immunization due  Comments:  Recommend flu shot  Patient stated she will come next months for it  Patient Instructions   Patient is returning for physical next month      No orders of the defined types were placed in this encounter  Subjective:     Patient ID: Justice Simpson is a 59 y o  female      HPI  Cough  She did not use her steroid inhaler  She said she was concerned about possible side effect  She had been using Proventil inhaler 3 times a day and she feels wonderful on it she has no further cough, and sleeping well     She has appointment with the allergist in November  Wheezing also resolved  HTN  She has been taking metoprolol  Denied headache or dizziness  Test results  Chest x-ray  Discussed result with patient  Advised patient to discuss with her allergist if she needs to stop metoprolol or if okay to keep it    Review of Systems   Constitutional: Negative for appetite change and fatigue  HENT: Negative for ear pain, tinnitus, trouble swallowing and voice change  Eyes: Negative for photophobia, pain and visual disturbance  Respiratory: Negative for cough, chest tightness and wheezing  Cardiovascular: Negative for chest pain, palpitations and leg swelling  Gastrointestinal: Negative for abdominal distention, abdominal pain, anal bleeding, constipation, diarrhea, nausea and rectal pain  Endocrine: Negative for cold intolerance, heat intolerance, polydipsia and polyuria  Genitourinary: Negative for decreased urine volume, difficulty urinating, dysuria, flank pain, frequency, hematuria and urgency     Musculoskeletal: Negative for arthralgias, back pain, gait problem, myalgias and neck pain  Skin: Negative for pallor and rash  Allergic/Immunologic: Negative for immunocompromised state  Neurological: Negative for dizziness, seizures, syncope and speech difficulty  Hematological: Negative for adenopathy  Does not bruise/bleed easily  Psychiatric/Behavioral: Negative for agitation, confusion and hallucinations  The patient is not nervous/anxious  Objective:     Physical Exam  Constitutional:       General: She is not in acute distress  Appearance: She is well-developed  She is diaphoretic  She is not ill-appearing  HENT:      Head: Normocephalic and atraumatic  Eyes:      General: No scleral icterus  Conjunctiva/sclera: Conjunctivae normal       Pupils: Pupils are equal, round, and reactive to light  Neck:      Thyroid: No thyromegaly  Vascular: No JVD  Cardiovascular:      Rate and Rhythm: Normal rate and regular rhythm  Heart sounds: Normal heart sounds  No murmur heard  Comments: Extremities  No edema  Pulmonary:      Effort: Pulmonary effort is normal  No respiratory distress  Breath sounds: Normal breath sounds  No stridor  No wheezing or rales  Abdominal:      Palpations: Abdomen is soft  There is no mass  Tenderness: There is no abdominal tenderness  There is no guarding or rebound  Hernia: No hernia is present  Lymphadenopathy:      Cervical: No cervical adenopathy  Skin:     Findings: No rash  Neurological:      Mental Status: She is alert and oriented to person, place, and time  Cranial Nerves: No cranial nerve deficit  Motor: No abnormal muscle tone  Coordination: Coordination normal    Psychiatric:         Mood and Affect: Mood normal          Behavior: Behavior normal          Thought Content:  Thought content normal  EENMT

## 2021-10-28 ENCOUNTER — APPOINTMENT (OUTPATIENT)
Dept: NEPHROLOGY | Facility: CLINIC | Age: 46
End: 2021-10-28

## 2021-10-29 ENCOUNTER — RX RENEWAL (OUTPATIENT)
Age: 46
End: 2021-10-29

## 2021-11-02 NOTE — ED PROVIDER NOTE - GASTROINTESTINAL, MLM
INDICATION:

CONFIRM DATING.



COMPARISON:

None.



TECHNIQUE:

Real-time sonographic evaluation of the gravid uterus performed.



FINDINGS:

Estimated gestational age is14 weeks 3 days, EDC 04/30/2022.



Presentation: Variable

Placenta fundal, posterior, grade 0, without evidence of placenta previa.

Fetal heart rate is recorded at 160 beats per minute.

Amniotic fluid is subjectively normal.

Closed cervical length is measured at 5.2 cm.



Biometry chart:



BPD: 26 mm, 14 weeks 4 days, 57th percentile.

HC: 101 mm, 14 weeks 5 days, 61st percentile

AC: 79 mm, 14 weeks 2 days, 46th percentile

Femur length: 14 mm, 14 weeks 0 days, 35th percentile

HC to AC ratio: 1.29, normal range 1.11-1.30.



Estimated fetal weight: 92g, 22nd percentile.



IMPRESSION:

Viable single intrauterine gestation as above.





<Electronically signed by Lebron Torres > 11/02/21 6603
abdomen non distended

## 2021-11-21 ENCOUNTER — EMERGENCY (EMERGENCY)
Facility: HOSPITAL | Age: 46
LOS: 1 days | Discharge: HOME | End: 2021-11-21
Admitting: EMERGENCY MEDICINE
Payer: COMMERCIAL

## 2021-11-21 VITALS
DIASTOLIC BLOOD PRESSURE: 86 MMHG | OXYGEN SATURATION: 97 % | RESPIRATION RATE: 18 BRPM | SYSTOLIC BLOOD PRESSURE: 145 MMHG | TEMPERATURE: 98 F | HEART RATE: 87 BPM | WEIGHT: 255.07 LBS | HEIGHT: 74 IN

## 2021-11-21 DIAGNOSIS — Z87.441 PERSONAL HISTORY OF NEPHROTIC SYNDROME: ICD-10-CM

## 2021-11-21 DIAGNOSIS — M79.671 PAIN IN RIGHT FOOT: ICD-10-CM

## 2021-11-21 DIAGNOSIS — M10.9 GOUT, UNSPECIFIED: ICD-10-CM

## 2021-11-21 DIAGNOSIS — I10 ESSENTIAL (PRIMARY) HYPERTENSION: ICD-10-CM

## 2021-11-21 PROCEDURE — 99283 EMERGENCY DEPT VISIT LOW MDM: CPT | Mod: 25

## 2021-11-21 PROCEDURE — 73630 X-RAY EXAM OF FOOT: CPT | Mod: 26,RT

## 2021-11-21 PROCEDURE — 73630 X-RAY EXAM OF FOOT: CPT

## 2021-11-21 RX ORDER — IBUPROFEN 200 MG
800 TABLET ORAL ONCE
Refills: 0 | Status: COMPLETED | OUTPATIENT
Start: 2021-11-21 | End: 2021-11-21

## 2021-11-21 RX ORDER — IBUPROFEN 200 MG
1 TABLET ORAL
Qty: 15 | Refills: 0
Start: 2021-11-21 | End: 2021-11-25

## 2021-11-21 RX ADMIN — Medication 800 MILLIGRAM(S): at 10:19

## 2021-11-21 NOTE — ED PROVIDER NOTE - NSFOLLOWUPINSTRUCTIONS_ED_ALL_ED_FT
Musculoskeletal Pain    WHAT YOU NEED TO KNOW:    Muscle pain can be dull, achy, or sharp. You may have pain and tenderness to the touch as well. It can occur anywhere on your body and is often brought on by exercise. Muscle pain may occur from an injury, such as a sprain, tendonitis, or bone fracture. Muscle pain can also be the result of medical conditions, such as polymyositis, fibromyalgia, and connective tissue disorders.     DISCHARGE INSTRUCTIONS:    Self care:     Rest as directed and avoid activity that causes pain. You may be able to return to normal activity when you can move without pain. Follow directions for rest and activity. You are at risk for injury for 3 weeks after your symptoms go away.       Ice your painful muscle area to decrease pain and swelling. Use an ice pack, or put ice in a plastic bag and cover it with a towel. Always put a cloth between the ice and your skin. Apply the ice as often as directed for the first 24 to 48 hours.       Compression with a splint, brace, or elastic bandage helps decrease pain and swelling. This may be needed for muscle pain in arms or legs. A splint, brace, or bandage will also help protect the painful area when you move around.       Elevate a painful arm or leg to reduce swelling and pain. Elevate your limb while you are sitting or lying down. Prop a painful leg on pillows to keep it above the level of your heart.    Medicines:     NSAIDs help decrease swelling and pain or fever. This medicine is available with or without a doctor's order. NSAIDs can cause stomach bleeding or kidney problems in certain people. If you take blood thinner medicine, always ask your healthcare provider if NSAIDs are safe for you. Always read the medicine label and follow directions.      Acetaminophen is used to decrease pain. It is available without a doctor's order. Ask your healthcare provider how much to take and when to take it. Follow directions. Acetaminophen can cause liver damage if not taken correctly. Do not take more than one medicine that contains acetaminophen unless directed.       Muscle relaxers help relax your muscles to decrease pain and muscle spasms.       Steroids may be given to decrease redness, pain, and swelling.      Take your medicine as directed. Contact your healthcare provider if you think your medicine is not helping or if you have side effects. Tell him if you are allergic to any medicine. Keep a list of the medicines, vitamins, and herbs you take. Include the amounts, and when and why you take them. Bring the list or the pill bottles to follow-up visits. Carry your medicine list with you in case of an emergency.    Follow up with your healthcare provider as directed: You may need more tests to help healthcare providers find the cause of your muscle pain. You may need physical therapy to learn muscle strengthening exercises. Write down your questions so you remember to ask them during your visits.     Contact your healthcare provider if:     You have a fever.       You have trouble sleeping because of your pain.       Your painful area becomes more tender, red, and warm to the touch.       You have decreased movement of the painful area.       You have questions or concerns about your condition or care.    Return to the emergency department if:     You have increased severe pain when you move the painful muscle area.       You lose feeling in your painful muscle area.       You have new or worse swelling in the painful area. Your skin may feel tight.       You have increased muscle pain or pain that does not improve with treatment.      Plantar Fasciitis    WHAT YOU NEED TO KNOW:    PF is swelling of the plantar fascia. The plantar fascia is a thick band of tissue that connects your heel bone to your toes. This part of your foot helps support the arch of your foot and absorbs shock.    DISCHARGE INSTRUCTIONS:    Call your doctor if:   •Your pain or swelling suddenly increase.      •You develop knee, hip, or back pain.      •You have questions or concerns about your condition or care.      Medicines: You may need any of the following:   •Acetaminophen decreases pain and fever. It is available without a doctor's order. Ask how much to take and how often to take it. Follow directions. Read the labels of all other medicines you are using to see if they also contain acetaminophen, or ask your doctor or pharmacist. Acetaminophen can cause liver damage if not taken correctly. Do not use more than 4 grams (4,000 milligrams) total of acetaminophen in one day.       •NSAIDs, such as ibuprofen, help decrease swelling, pain, and fever. NSAIDs can cause stomach bleeding or kidney problems in certain people. If you take blood thinner medicine, always ask your healthcare provider if NSAIDs are safe for you. Always read the medicine label and follow directions.      •Take your medicine as directed. Contact your healthcare provider if you think your medicine is not helping or if you have side effects. Tell him of her if you are allergic to any medicine. Keep a list of the medicines, vitamins, and herbs you take. Include the amounts, and when and why you take them. Bring the list or the pill bottles to follow-up visits. Carry your medicine list with you in case of an emergency.      Self-care:   •Wear your splint or shoe inserts as directed. You may need to wear a splint at night to keep your foot stretched while you sleep. This will help prevent sharp pain first thing in the morning. Shoe inserts will help decrease stress on your plantar fascia when you walk or exercise.       •Apply ice on your plantar fascia. Ice helps prevent tissue damage and decreases swelling and pain. Fill a water bottle with water and freeze it. Wrap a towel around the bottle or cover it with a pillow case. Roll the water bottle under your foot for 10 minutes in the morning and after work.      •Massage your plantar fascia as directed. This may help decrease swelling and pain. Roll a golf ball under your foot for 10 minutes. Repeat 3 times each day.       •Go to physical therapy as directed. A physical therapist teaches you exercises to help improve movement and strength, and to decrease pain.       Prevent plantar fasciitis:   •Maintain a healthy weight. This will help decrease stress on your feet. Ask your healthcare provider how much you should weigh. Ask him to help you create a weight loss plan if you are overweight.      •Do low-impact exercises. Low-impact exercises decrease stress on your plantar fascia. Examples include swimming or bicycling.       •Start new activities slowly. Increase the intensity and time gradually.       •Wear shoes that fit well and support your arch. Replace your shoes before the padding or shock absorption wears out. Do not walk or  bare feet or sandals for long periods of time.      Follow up with your doctor as directed: Write down your questions so you remember to ask them during your visits.

## 2021-11-21 NOTE — ED PROVIDER NOTE - OBJECTIVE STATEMENT
45 yo m with pmh of gout, htn and nephrotic syndrome c/o R foot pain x 2 days. Pain on in step of foot. Denies trauma. Pain worse  with walking. Took 2 tylenol with minimal relief. Pain different than gout. Denies fever, chills, numbness, tingling.

## 2021-11-21 NOTE — ED PROVIDER NOTE - CARE PROVIDER_API CALL
Delvis Hurt ([facility])  Podiatric Medicine and Surgery  [address], [address]  [place] [address]  Phone: [phone]  Fax: [phone]  Follow Up Time:

## 2021-11-21 NOTE — ED PROVIDER NOTE - PHYSICAL EXAMINATION
CONSTITUTIONAL: Well-appearing;  in no apparent distress.   HEAD: Normocephalic; atraumatic.   NECK: Supple; non-tender;   CARDIOVASCULAR: rrr, no audible murmurs   RESPIRATORY: Breathing easily;   MSK: R foot- + tenderness to medial aspect of foot, no erythema, warmth, swelling   EXT: No cyanosis or edema; N/V intact  SKIN: Normal for age and race; warm; dry; good turgor; no apparent lesions or rash.

## 2021-11-21 NOTE — ED PROVIDER NOTE - PATIENT PORTAL LINK FT
You can access the FollowMyHealth Patient Portal offered by [facility] by registering at the following website: [email or web address]. By joining [facility]’s FollowMyHealth portal, you will also be able to view your health information using other applications (apps) compatible with our system.

## 2021-11-21 NOTE — ED ADULT NURSE NOTE - OBJECTIVE STATEMENT
Pt is a 47 y/o male A&Ox4 in NAD ambulatory with steady gait c/o right foot pain x 2 days. Pt denies injury/trauma.

## 2021-11-21 NOTE — ED PROVIDER NOTE - CLINICAL SUMMARY MEDICAL DECISION MAKING FREE TEXT BOX
47 yo m with pmh of gout, htn and nephrotic syndrome c/o R foot pain x 2 days. Pain on in step of foot. Denies trauma. Pain worse  with walking. Took 2 tylenol with minimal relief. Pain different than gout. Denies fever, chills, numbness, tingling. R foot- + tenderness to medial aspect of foot, no erythema, warmth, swelling 45 yo m with pmh of gout, htn and nephrotic syndrome c/o R foot pain x 2 days. Pain on in step of foot. Denies trauma. Pain worse  with walking. Took 2 tylenol with minimal relief. Pain different than gout. Denies fever, chills, numbness, tingling. R foot- + tenderness to medial aspect of foot, no erythema, warmth, swelling. XR neg. ?plantar fasciitis, advised NSAIDs. Will refer to podiatry

## 2022-01-20 ENCOUNTER — APPOINTMENT (OUTPATIENT)
Dept: RHEUMATOLOGY | Facility: CLINIC | Age: 47
End: 2022-01-20
Payer: COMMERCIAL

## 2022-01-20 ENCOUNTER — RESULT REVIEW (OUTPATIENT)
Age: 47
End: 2022-01-20

## 2022-01-20 VITALS — DIASTOLIC BLOOD PRESSURE: 76 MMHG | SYSTOLIC BLOOD PRESSURE: 137 MMHG

## 2022-01-20 VITALS
SYSTOLIC BLOOD PRESSURE: 160 MMHG | TEMPERATURE: 97.8 F | HEART RATE: 71 BPM | BODY MASS INDEX: 32.73 KG/M2 | OXYGEN SATURATION: 99 % | DIASTOLIC BLOOD PRESSURE: 77 MMHG | WEIGHT: 255 LBS | HEIGHT: 74 IN

## 2022-01-20 DIAGNOSIS — M79.644 PAIN IN RIGHT FINGER(S): ICD-10-CM

## 2022-01-20 DIAGNOSIS — G72.0 DRUG-INDUCED MYOPATHY: ICD-10-CM

## 2022-01-20 PROCEDURE — 99205 OFFICE O/P NEW HI 60 MIN: CPT | Mod: 25

## 2022-01-20 PROCEDURE — 36415 COLL VENOUS BLD VENIPUNCTURE: CPT

## 2022-01-20 RX ORDER — PREDNISONE 5 MG/1
5 TABLET ORAL
Qty: 44 | Refills: 0 | Status: ACTIVE | COMMUNITY
Start: 2022-01-20 | End: 1900-01-01

## 2022-01-20 NOTE — ASSESSMENT
[FreeTextEntry1] : 46 year old male with HTN, Obesity, CKD,  gout  diagnosed since 1995, usual flares involved both ankles, toes and elbows.  \par he has tried colchicine in the past but developed colchicine induced myopathy (biopsied 2015)\par Had major flare  while admitted at [facility] in August 2020 \par Admitted for post streptococcal GN with DELMA. \par S/p diuresis, developed a gout flare in his L elbow. Also had a low fever at this time. \par Flare consistent with previous flares. Tried aspiration but failed\par Patient discharged on Prednisone 40mg daily, which was subsequently tapered off. \par Since then he has been taking allopurinol 50mg, previously was taking 100mg. His uric acid trend reviewed has been never at target <6, most recent uric acid in 08/2021 8.5.\par Has new R 1-3rd PIP joint tenderness and synovitis on exam, which could be due to inflammatory arthritis such as gout(but not typical joint distribution) vs seronegative RA ( his RF and KIRAN checked few times in the past has been negative) \par \par 1. Gout and hyperuricemia: uric acid level not at goal \par We talked about target uric acid level <6\par Can crease Allopurinol 50mg to 100mg diaily and up titrate as tolerated. \par During the initial period of urate-lowering therapy, patients should also receive prophylactic treatment to decrease or prevent recurrent episodes of gout flare, since colchicine is contraindicated due to previous myopathy and NSAIDs not safe in CKD will add  Prednisone 10mg daily for now for 2 weeks and then taper to 5mg daily \par I had an extensive discussion with the patient about treatment for gout. I educated patient on precipitating factors of gout like alcohol and red meat. \par Changing your diet may reduce the frequency of gout flares. Because obesity is a risk factor for gout, as well as for many other health conditions, losing weight is an important goal.\par \par Will obtain Xray b/l hands/wrist and MSK US for justification of possible inflammatory synovitis vs double contour sign will reflect deposition of MSU in joint space.\par Will check anti CCP, ESR , CRP and Uric acid level ( done during the visit today)\par \par \par f/u in 6 weeks \par

## 2022-01-20 NOTE — REVIEW OF SYSTEMS
[Arthralgias] : arthralgias [Joint Pain] : joint pain [Joint Swelling] : joint swelling [Joint Stiffness] : joint stiffness [Skin Lesions] : skin lesion [Negative] : Neurological [Fever] : no fever [Chills] : no chills [Feeling Poorly] : not feeling poorly [Muscle Weakness] : no muscle weakness [Easy Bleeding] : no tendency for easy bleeding [Easy Bruising] : no tendency for easy bruising [Swollen Glands] : no swollen glands

## 2022-01-20 NOTE — HISTORY OF PRESENT ILLNESS
[Arthralgias] : arthralgias [FreeTextEntry1] : Self Referred  for Rheumatology consultation\par Saw Rheum Dr. Stevens in 2020 \par \par 46 year old male with HTN, Obesity  gout  diagnosed since 1995, usual flares involved both ankles, toes and elbows.  \par he has tried colchicine in the past but developed colchicine induced myopathy (biopsied 2015)\par Had major flare  while admitted at [facility] in August 2020 \par Admitted for post streptococcal GN with DELMA. \par S/p diuresis, developed a gout flare in his L elbow. Also had a low fever at this time. \par Flare consistent with previous flares. Tried aspiration but failed\par Patient discharged on Prednisone 40mg daily, which was subsequently tapered off. \par Since then he has been taking allopurinol 50mg, previously was taking 100mg. His uric acid trend reviewed has been never at target <6, most recent uric acid in 08/2021 8.5. \par His renal function improved, still with fluctuating CKD. , most recent Cr 1.2 GFR 81\par He has flared once since hospital discharge but not as severely and did not last long. \par Lately has has been experiencing right  hand pain, swelling and  stiffness, mostly affecting PIP joints. \par \par \par \par \par Meds: \par Allopurinol 50mg \par \par No h/o morning stiffness, memory loss, patchy hair loss, sicca symptoms, photosensitivity, HA,  Raynaud's, oral ulcers, nasal ulcers. \par No history of pleuropericarditis \par No history of protein/hematuria \par No history of cytopenias. \par No Hx of VTE/DVT/PE\par Personal or family hx of autoimmune disease, no hx of psoriasis\par  [Anorexia] : no anorexia [Weight Loss] : no weight loss [Malaise] : no malaise [Fever] : no fever [Chills] : no chills [Fatigue] : no fatigue [Depression] : no depression [Malar Facial Rash] : no malar facial rash [Skin Lesions] : no lesions [Skin Nodules] : no skin nodules [Oral Ulcers] : no oral ulcers [Cough] : no cough [Dry Mouth] : no dry mouth [Dysphonia] : no dysphonia [Dysphagia] : no dysphagia [Shortness of Breath] : no shortness of breath [Chest Pain] : no chest pain [Joint Swelling] : no joint swelling [Falls] : no falls [Difficulty Standing] : no difficulty standing [Difficulty Walking] : no difficulty walking [Myalgias] : no myalgias [Eye Pain] : no eye pain [Eye Redness] : no eye redness [Dry Eyes] : no dry eyes

## 2022-01-20 NOTE — PHYSICAL EXAM
[General Appearance - Alert] : alert [General Appearance - In No Acute Distress] : in no acute distress [General Appearance - Well Nourished] : well nourished [General Appearance - Well Developed] : well developed [General Appearance - Well-Appearing] : healthy appearing [Sclera] : the sclera and conjunctiva were normal [Outer Ear] : the ears and nose were normal in appearance [Examination Of The Oral Cavity] : the lips and gums were normal [Nasal Cavity] : the nasal mucosa and septum were normal [Neck Appearance] : the appearance of the neck was normal [Respiration, Rhythm And Depth] : normal respiratory rhythm and effort [Auscultation Breath Sounds / Voice Sounds] : lungs were clear to auscultation bilaterally [Heart Rate And Rhythm] : heart rate was normal and rhythm regular [Heart Sounds] : normal S1 and S2 [Edema] : there was no peripheral edema [Oriented To Time, Place, And Person] : oriented to person, place, and time [] : the neck was supple [Neck Cervical Mass (___cm)] : no neck mass was observed [Exaggerated Use Of Accessory Muscles For Inspiration] : no accessory muscle use [Murmurs] : no murmurs [Abdomen Soft] : soft [Abnormal Walk] : normal gait [Nail Clubbing] : no clubbing  or cyanosis of the fingernails [Motor Tone] : muscle strength and tone were normal [Motor Exam] : the motor exam was normal [FreeTextEntry1] : No tophi identified, Chronic hyperpigmented skin lesions of the back

## 2022-01-21 ENCOUNTER — RESULT REVIEW (OUTPATIENT)
Age: 47
End: 2022-01-21

## 2022-01-21 ENCOUNTER — OUTPATIENT (OUTPATIENT)
Dept: OUTPATIENT SERVICES | Facility: HOSPITAL | Age: 47
LOS: 1 days | End: 2022-01-21
Payer: COMMERCIAL

## 2022-01-21 PROCEDURE — 73110 X-RAY EXAM OF WRIST: CPT

## 2022-01-21 PROCEDURE — 73110 X-RAY EXAM OF WRIST: CPT | Mod: 26,50

## 2022-01-21 PROCEDURE — 73130 X-RAY EXAM OF HAND: CPT

## 2022-01-21 PROCEDURE — 73130 X-RAY EXAM OF HAND: CPT | Mod: 26,50

## 2022-01-24 ENCOUNTER — NON-APPOINTMENT (OUTPATIENT)
Age: 47
End: 2022-01-24

## 2022-01-24 LAB
CCP AB SER IA-ACNC: 13 UNITS
CRP SERPL-MCNC: <3 MG/L
ENA SS-A AB SER IA-ACNC: <0.2 AL
ENA SS-B AB SER IA-ACNC: <0.2 AL
ERYTHROCYTE [SEDIMENTATION RATE] IN BLOOD BY WESTERGREN METHOD: 19 MM/HR
RF+CCP IGG SER-IMP: NEGATIVE
URATE SERPL-MCNC: 10 MG/DL

## 2022-02-10 ENCOUNTER — LABORATORY RESULT (OUTPATIENT)
Age: 47
End: 2022-02-10

## 2022-02-10 ENCOUNTER — APPOINTMENT (OUTPATIENT)
Dept: NEPHROLOGY | Facility: CLINIC | Age: 47
End: 2022-02-10
Payer: COMMERCIAL

## 2022-02-10 VITALS — DIASTOLIC BLOOD PRESSURE: 82 MMHG | HEART RATE: 84 BPM | SYSTOLIC BLOOD PRESSURE: 112 MMHG

## 2022-02-10 VITALS — DIASTOLIC BLOOD PRESSURE: 80 MMHG | HEART RATE: 60 BPM | SYSTOLIC BLOOD PRESSURE: 126 MMHG

## 2022-02-10 VITALS — SYSTOLIC BLOOD PRESSURE: 120 MMHG | DIASTOLIC BLOOD PRESSURE: 80 MMHG | HEART RATE: 72 BPM

## 2022-02-10 VITALS — OXYGEN SATURATION: 98 % | WEIGHT: 255 LBS | BODY MASS INDEX: 32.74 KG/M2 | HEART RATE: 63 BPM | TEMPERATURE: 98.4 F

## 2022-02-10 VITALS — DIASTOLIC BLOOD PRESSURE: 74 MMHG | SYSTOLIC BLOOD PRESSURE: 120 MMHG

## 2022-02-10 DIAGNOSIS — M54.6 PAIN IN THORACIC SPINE: ICD-10-CM

## 2022-02-10 DIAGNOSIS — M51.26 OTHER INTERVERTEBRAL DISC DISPLACEMENT, LUMBAR REGION: ICD-10-CM

## 2022-02-10 PROCEDURE — 36415 COLL VENOUS BLD VENIPUNCTURE: CPT

## 2022-02-10 PROCEDURE — 99214 OFFICE O/P EST MOD 30 MIN: CPT | Mod: 25

## 2022-02-10 NOTE — ADDENDUM
[FreeTextEntry1] : All medical record entries made by the Scribe were at my, SUZETTE Vaughan's direction and personally dictated by me on 02/10/2022. I have received the chart and agree that the record accurately reflects my personal performance of the history, physical exam, assessment, and plan. I have also personally directed, reviewed, and agreed with the chart.\par

## 2022-02-10 NOTE — PHYSICAL EXAM
[General Appearance - Alert] : alert [General Appearance - In No Acute Distress] : in no acute distress [Sclera] : the sclera and conjunctiva were normal [PERRL With Normal Accommodation] : pupils were equal in size, round, and reactive to light [Extraocular Movements] : extraocular movements were intact [Outer Ear] : the ears and nose were normal in appearance [Hearing Threshold Finger Rub Not [place]] : hearing was normal [Neck Appearance] : the appearance of the neck was normal [Neck Cervical Mass (___cm)] : no neck mass was observed [Jugular Venous Distention Increased] : there was no jugular-venous distention [Thyroid Diffuse Enlargement] : the thyroid was not enlarged [Thyroid Nodule] : there were no palpable thyroid nodules [Auscultation Breath Sounds / Voice Sounds] : lungs were clear to auscultation bilaterally [Heart Rate And Rhythm] : heart rate was normal and rhythm regular [Heart Sounds] : normal S1 and S2 [Heart Sounds Gallop] : no gallops [Murmurs] : no murmurs [Heart Sounds Pericardial Friction Rub] : no pericardial rub [Edema] : there was no peripheral edema [Bowel Sounds] : normal bowel sounds [Abdomen Soft] : soft [Abdomen Tenderness] : non-tender [Abdomen Mass (___ Cm)] : no abdominal mass palpated [No CVA Tenderness] : no ~M costovertebral angle tenderness [No Spinal Tenderness] : no spinal tenderness [Abnormal Walk] : normal gait [Involuntary Movements] : no involuntary movements were seen [Musculoskeletal - Swelling] : no joint swelling seen [Motor Tone] : muscle strength and tone were normal [Skin Color & Pigmentation] : normal skin color and pigmentation [Skin Turgor] : normal skin turgor [] : no rash [No Focal Deficits] : no focal deficits [Oriented To Time, Place, And Person] : oriented to person, place, and time [Impaired Insight] : insight and judgment were intact [Affect] : the affect was normal

## 2022-02-10 NOTE — ASSESSMENT
[FreeTextEntry1] : Plan:\par 1) HTN: BP acceptable today on no antihypertensive medications. Patient urged to make more frequent visits to my office in order to monitor his BPs, but in view of his acceptable pressures, may continue holding antihypertensive medications for now. Will reassess pressure at next evaluation. \par 2) CRI: last creatinine of 2.70, eGFR of 81; will continue to monitor function with CMP due to risk for progression of renal failure. \par 3) Prediabetes: Last HgbA1c of 5.6% is borderline elevated. Advised patient to increase exercise regimen, as tolerated and weather permitting, along with modest weight loss and avoidance of excessive sweets with goal of lowering HbA1c.\par 4) Continued upper back pain: Have explained to patient that he may experience increased back pain following cessation of Prednisone. Referred to Dr. Frank Schwab for evaluation. \par \par No changes to medications. \par \par Labs were drawn and patient will return in 6 weeks for a follow-up appointment.

## 2022-02-10 NOTE — HISTORY OF PRESENT ILLNESS
[FreeTextEntry1] : The patient is a 46 year old male presenting for f/u evaluation and active reassessment of HTN, gout, cervical radiculopathy with chronic back pain, h/o post-strep GN, proteinuria, microhematuria, and CRI. Last seen August 2021.\par \par The patient has had recent consult with rheumatologist Dr. Oconnor. His 01/21/22 Xrays of bilateral hands showed no radiographic evidence for inflammatory arthropathy. \par \par The patient is feeling generally well today but continues to c/o upper back pain near the scapulas both at rest and with movement. He has received two epidurals which he reports did not help him. He admits that his back pain occasionally interferes with his daily life, though he continues to bear the pain with medication. The patient reports he has been feeling dizzy at work and has stopped using his clonidine patches. \par - x3 COVID vaccinated.\par - Current with influenza vaccine. \par \par Labs\par - 01/24/22: uric acid 10.0, sed rate 19\par - 09/22/21: creatinine 2.70\par - 08/30/21: positive COVID spike domain antibody, sed rate 26, HDL 35, creatinine 1.22, eGFR 81\par \par Current medications: allopurinol 100mg QD, Flexeril\par - has not been using clonidine patch\par - currently on tapering dose of Prednisone to end in approx. 1 week

## 2022-02-10 NOTE — END OF VISIT
[Time Spent: ___ minutes] : I have spent [unfilled] minutes of time on the encounter. [FreeTextEntry3] : Documented by Svetlana Morales acting as a scribe for SUZETTE Vaughan on 02/10/2022.\par

## 2022-02-13 LAB
24R-OH-CALCIDIOL SERPL-MCNC: 53.2 PG/ML
25(OH)D3 SERPL-MCNC: 24.6 NG/ML
ALBUMIN SERPL ELPH-MCNC: 4.7 G/DL
ALP BLD-CCNC: 98 U/L
ALT SERPL-CCNC: 36 U/L
ANION GAP SERPL CALC-SCNC: 16 MMOL/L
APPEARANCE: CLEAR
AST SERPL-CCNC: 39 U/L
BACTERIA: NEGATIVE
BASOPHILS # BLD AUTO: 0.04 K/UL
BASOPHILS NFR BLD AUTO: 0.4 %
BILIRUB SERPL-MCNC: 0.6 MG/DL
BILIRUBIN URINE: NEGATIVE
BLOOD URINE: NEGATIVE
BUN SERPL-MCNC: 16 MG/DL
C3 SERPL-MCNC: 125 MG/DL
C4 SERPL-MCNC: 23 MG/DL
CALCIUM SERPL-MCNC: 9.9 MG/DL
CALCIUM SERPL-MCNC: 9.9 MG/DL
CHLORIDE SERPL-SCNC: 104 MMOL/L
CHOLEST SERPL-MCNC: 170 MG/DL
CO2 SERPL-SCNC: 22 MMOL/L
COLOR: NORMAL
CREAT SERPL-MCNC: 1.14 MG/DL
CREAT SPEC-SCNC: 111 MG/DL
CREAT/PROT UR: 0.1 RATIO
CRP SERPL-MCNC: <3 MG/L
EOSINOPHIL # BLD AUTO: 0.1 K/UL
EOSINOPHIL NFR BLD AUTO: 1 %
ERYTHROCYTE [SEDIMENTATION RATE] IN BLOOD BY WESTERGREN METHOD: 23 MM/HR
ESTIMATED AVERAGE GLUCOSE: 123 MG/DL
FERRITIN SERPL-MCNC: 36 NG/ML
FOLATE SERPL-MCNC: >20 NG/ML
GLUCOSE QUALITATIVE U: NEGATIVE
GLUCOSE SERPL-MCNC: 91 MG/DL
HBA1C MFR BLD HPLC: 5.9 %
HBV SURFACE AB SER QL: NONREACTIVE
HBV SURFACE AG SER QL: NONREACTIVE
HCT VFR BLD CALC: 46.7 %
HCV AB SER QL: NONREACTIVE
HCV S/CO RATIO: 0.21 S/CO
HCYS SERPL-MCNC: 8.8 UMOL/L
HDLC SERPL-MCNC: 45 MG/DL
HGB BLD-MCNC: 15.4 G/DL
HYALINE CASTS: 0 /LPF
IMM GRANULOCYTES NFR BLD AUTO: 0.2 %
IRON SATN MFR SERPL: 19 %
IRON SERPL-MCNC: 69 UG/DL
KETONES URINE: NEGATIVE
LDLC SERPL CALC-MCNC: 100 MG/DL
LEUKOCYTE ESTERASE URINE: NEGATIVE
LYMPHOCYTES # BLD AUTO: 3.7 K/UL
LYMPHOCYTES NFR BLD AUTO: 37.3 %
MAGNESIUM SERPL-MCNC: 2.1 MG/DL
MAN DIFF?: NORMAL
MCHC RBC-ENTMCNC: 29.3 PG
MCHC RBC-ENTMCNC: 33 GM/DL
MCV RBC AUTO: 89 FL
MICROSCOPIC-UA: NORMAL
MONOCYTES # BLD AUTO: 0.74 K/UL
MONOCYTES NFR BLD AUTO: 7.5 %
NEUTROPHILS # BLD AUTO: 5.33 K/UL
NEUTROPHILS NFR BLD AUTO: 53.6 %
NITRITE URINE: NEGATIVE
NONHDLC SERPL-MCNC: 125 MG/DL
PARATHYROID HORMONE INTACT: 34 PG/ML
PH URINE: 6
PHOSPHATE SERPL-MCNC: 3.8 MG/DL
PLATELET # BLD AUTO: 252 K/UL
POTASSIUM SERPL-SCNC: 4.4 MMOL/L
PROT SERPL-MCNC: 7.8 G/DL
PROT UR-MCNC: 14 MG/DL
PROTEIN URINE: NEGATIVE
RBC # BLD: 5.25 M/UL
RBC # FLD: 14 %
RED BLOOD CELLS URINE: 1 /HPF
RHEUMATOID FACT SER QL: <10 IU/ML
SODIUM SERPL-SCNC: 141 MMOL/L
SPECIFIC GRAVITY URINE: 1.01
SQUAMOUS EPITHELIAL CELLS: 0 /HPF
T3FREE SERPL-MCNC: 3.52 PG/ML
T3RU NFR SERPL: 0.9 TBI
T4 FREE SERPL-MCNC: 1.2 NG/DL
T4 SERPL-MCNC: 4.7 UG/DL
THYROGLOB AB SERPL-ACNC: <20 IU/ML
THYROPEROXIDASE AB SERPL IA-ACNC: <10 IU/ML
TIBC SERPL-MCNC: 356 UG/DL
TRIGL SERPL-MCNC: 124 MG/DL
TSH SERPL-ACNC: 1.96 UIU/ML
UIBC SERPL-MCNC: 287 UG/DL
URATE SERPL-MCNC: 7.1 MG/DL
UROBILINOGEN URINE: NORMAL
VIT B12 SERPL-MCNC: 1016 PG/ML
WBC # FLD AUTO: 9.93 K/UL
WHITE BLOOD CELLS URINE: 0 /HPF

## 2022-02-20 LAB
ALBUMIN MFR SERPL ELPH: 56 %
ALBUMIN SERPL-MCNC: 4.4 G/DL
ALBUMIN/GLOB SERPL: 1.3 RATIO
ALP BONE SERPL-MCNC: 28 UG/L
ALPHA1 GLOB MFR SERPL ELPH: 3 %
ALPHA1 GLOB SERPL ELPH-MCNC: 0.2 G/DL
ALPHA2 GLOB MFR SERPL ELPH: 8.2 %
ALPHA2 GLOB SERPL ELPH-MCNC: 0.6 G/DL
ANA SER IF-ACNC: NEGATIVE
B-GLOBULIN MFR SERPL ELPH: 11.1 %
B-GLOBULIN SERPL ELPH-MCNC: 0.9 G/DL
COLLAGEN CTX SERPL-MCNC: 323 PG/ML
DEPRECATED KAPPA LC FREE/LAMBDA SER: 2.6 RATIO
GAMMA GLOB FLD ELPH-MCNC: 1.7 G/DL
GAMMA GLOB MFR SERPL ELPH: 21.7 %
IGA SER QL IEP: 204 MG/DL
IGG SER QL IEP: 1679 MG/DL
IGM SER QL IEP: 82 MG/DL
INTERPRETATION SERPL IEP-IMP: NORMAL
KAPPA LC CSF-MCNC: 1.95 MG/DL
KAPPA LC SERPL-MCNC: 5.07 MG/DL
M PROTEIN SPEC IFE-MCNC: NORMAL
METHYLMALONATE SERPL-SCNC: 126 NMOL/L
PROT SERPL-MCNC: 7.8 G/DL
PROT SERPL-MCNC: 7.8 G/DL

## 2022-02-22 NOTE — ED ADULT NURSE REASSESSMENT NOTE - PAIN REST
[___] : [unfilled] 0 [LVEF ___%] : LVEF [unfilled]% [None] : no pulmonary hypertension [Normal] : normal LA size [Mild] : mild mitral regurgitation

## 2022-03-03 ENCOUNTER — APPOINTMENT (OUTPATIENT)
Dept: RHEUMATOLOGY | Facility: CLINIC | Age: 47
End: 2022-03-03

## 2022-03-28 ENCOUNTER — LABORATORY RESULT (OUTPATIENT)
Age: 47
End: 2022-03-28

## 2022-03-28 ENCOUNTER — APPOINTMENT (OUTPATIENT)
Dept: NEPHROLOGY | Facility: CLINIC | Age: 47
End: 2022-03-28
Payer: COMMERCIAL

## 2022-03-28 VITALS — DIASTOLIC BLOOD PRESSURE: 78 MMHG | SYSTOLIC BLOOD PRESSURE: 118 MMHG | HEART RATE: 108 BPM

## 2022-03-28 VITALS
TEMPERATURE: 98.7 F | SYSTOLIC BLOOD PRESSURE: 120 MMHG | DIASTOLIC BLOOD PRESSURE: 70 MMHG | WEIGHT: 259.7 LBS | BODY MASS INDEX: 33.34 KG/M2 | HEART RATE: 96 BPM

## 2022-03-28 VITALS — HEART RATE: 84 BPM | SYSTOLIC BLOOD PRESSURE: 130 MMHG | DIASTOLIC BLOOD PRESSURE: 64 MMHG

## 2022-03-28 DIAGNOSIS — M54.9 DORSALGIA, UNSPECIFIED: ICD-10-CM

## 2022-03-28 DIAGNOSIS — L03.90 CELLULITIS, UNSPECIFIED: ICD-10-CM

## 2022-03-28 PROCEDURE — 99214 OFFICE O/P EST MOD 30 MIN: CPT | Mod: 25

## 2022-03-28 PROCEDURE — 36415 COLL VENOUS BLD VENIPUNCTURE: CPT

## 2022-03-28 NOTE — ASSESSMENT
[FreeTextEntry1] : Plan:\par 1) HTN: BP acceptable today on no medical therapy, though pulse is elevated. Will therefore continue without antihypertensives and reassess pressure at next evaluation. \par 2) CRI: last creatinine of 1.14, eGFR of 76; will continue to monitor function with CMP due to risk for progression of renal failure. \par 3) Prediabetes: Last HgbA1c of 5.9% is borderline elevated. Advised patient to increase exercise regimen, as tolerated and weather permitting, along with modest weight loss and avoidance of excessive sweets with goal of lowering HbA1c.\par 4) Vitamin D deficiency: Last Vit D 25-OH of 24.5 is low. Will recheck levels on labs today.\par \par No changes to medications. \par \par Labs were drawn at outside laboratory and patient will return in 2-3 months for a follow-up appointment.

## 2022-03-28 NOTE — END OF VISIT
[Time Spent: ___ minutes] : I have spent [unfilled] minutes of time on the encounter. [FreeTextEntry3] : Documented by Svetlana Morales acting as a scribe for SUZETTE Vaughan on 03/28/2022.

## 2022-03-28 NOTE — ADDENDUM
[FreeTextEntry1] : All medical record entries made by the Scribe were at my, SUZETTE Vaughan's direction and personally dictated by me on 03/28/2022. I have received the chart and agree that the record accurately reflects my personal performance of the history, physical exam, assessment, and plan. I have also personally directed, reviewed, and agreed with the chart.

## 2022-03-28 NOTE — HISTORY OF PRESENT ILLNESS
[FreeTextEntry1] : The patient is a 47 year old male presenting for f/u evaluation and active reassessment of HTN, gout, cervical radiculopathy with chronic back pain, h/o post-strep GN, proteinuria, microhematuria, and CRI.\par \par The patient is feeling generally well today and denies any new medical complaints. He states he has returned to working full-time. He reports that he has been eating well. He continues to have arthritis in his hands and will be having imaging of his hands tomorrow; continues to be followed by rheumatologist Dr. Oconnor.\par \par Labs 02/10/22: Vit D 25-OH 24.6, , creatinine 1.14, eGFR 76, sed rate 23, HgbA1c 5.9%\par \par Current medications: allopurinol 100mg QD\par - off Flexeril\par

## 2022-03-28 NOTE — PHYSICAL EXAM
[General Appearance - Alert] : alert [General Appearance - In No Acute Distress] : in no acute distress [Sclera] : the sclera and conjunctiva were normal [PERRL With Normal Accommodation] : pupils were equal in size, round, and reactive to light [Extraocular Movements] : extraocular movements were intact [Outer Ear] : the ears and nose were normal in appearance [Hearing Threshold Finger Rub Not [place]] : hearing was normal [Neck Appearance] : the appearance of the neck was normal [Neck Cervical Mass (___cm)] : no neck mass was observed [Jugular Venous Distention Increased] : there was no jugular-venous distention [Thyroid Diffuse Enlargement] : the thyroid was not enlarged [Thyroid Nodule] : there were no palpable thyroid nodules [Auscultation Breath Sounds / Voice Sounds] : lungs were clear to auscultation bilaterally [Heart Rate And Rhythm] : heart rate was normal and rhythm regular [Heart Sounds] : normal S1 and S2 [Heart Sounds Gallop] : no gallops [Murmurs] : no murmurs [Heart Sounds Pericardial Friction Rub] : no pericardial rub [Edema] : there was no peripheral edema [Bowel Sounds] : normal bowel sounds [Abdomen Soft] : soft [Abdomen Tenderness] : non-tender [Abdomen Mass (___ Cm)] : no abdominal mass palpated [No CVA Tenderness] : no ~M costovertebral angle tenderness [No Spinal Tenderness] : no spinal tenderness [Abnormal Walk] : normal gait [Involuntary Movements] : no involuntary movements were seen [Musculoskeletal - Swelling] : no joint swelling seen [Motor Tone] : muscle strength and tone were normal [Skin Turgor] : normal skin turgor [Skin Color & Pigmentation] : normal skin color and pigmentation [] : no rash [No Focal Deficits] : no focal deficits [Impaired Insight] : insight and judgment were intact [Oriented To Time, Place, And Person] : oriented to person, place, and time [Affect] : the affect was normal

## 2022-04-03 LAB
ALBUMIN SERPL ELPH-MCNC: 4.7 G/DL
ALP BLD-CCNC: 80 U/L
ALT SERPL-CCNC: 35 U/L
ANA PAT FLD IF-IMP: ABNORMAL
ANA SER IF-ACNC: ABNORMAL
ANION GAP SERPL CALC-SCNC: 12 MMOL/L
APPEARANCE: CLEAR
AST SERPL-CCNC: 52 U/L
BACTERIA: NEGATIVE
BASOPHILS # BLD AUTO: 0.04 K/UL
BASOPHILS NFR BLD AUTO: 0.5 %
BILIRUB SERPL-MCNC: 0.8 MG/DL
BILIRUBIN URINE: NEGATIVE
BLOOD URINE: NEGATIVE
BUN SERPL-MCNC: 20 MG/DL
C3 SERPL-MCNC: 133 MG/DL
C4 SERPL-MCNC: 24 MG/DL
CALCIUM SERPL-MCNC: 9.9 MG/DL
CHLORIDE SERPL-SCNC: 107 MMOL/L
CHOLEST SERPL-MCNC: 163 MG/DL
CO2 SERPL-SCNC: 23 MMOL/L
COLOR: NORMAL
CREAT SERPL-MCNC: 1.26 MG/DL
CREAT SPEC-SCNC: 103 MG/DL
CREAT/PROT UR: 0.2 RATIO
CRP SERPL-MCNC: <3 MG/L
EGFR: 70 ML/MIN/1.73M2
EOSINOPHIL # BLD AUTO: 0.13 K/UL
EOSINOPHIL NFR BLD AUTO: 1.6 %
ERYTHROCYTE [SEDIMENTATION RATE] IN BLOOD BY WESTERGREN METHOD: 14 MM/HR
ESTIMATED AVERAGE GLUCOSE: 117 MG/DL
FERRITIN SERPL-MCNC: 55 NG/ML
FOLATE SERPL-MCNC: >20 NG/ML
GLUCOSE QUALITATIVE U: NEGATIVE
GLUCOSE SERPL-MCNC: 71 MG/DL
HBA1C MFR BLD HPLC: 5.7 %
HBV SURFACE AB SER QL: NONREACTIVE
HBV SURFACE AG SER QL: NONREACTIVE
HCT VFR BLD CALC: 44.5 %
HCV AB SER QL: NONREACTIVE
HCV S/CO RATIO: 0.17 S/CO
HCYS SERPL-MCNC: 8 UMOL/L
HDLC SERPL-MCNC: 39 MG/DL
HGB BLD-MCNC: 14.8 G/DL
HYALINE CASTS: 0 /LPF
IMM GRANULOCYTES NFR BLD AUTO: 0.4 %
IRON SATN MFR SERPL: 21 %
IRON SERPL-MCNC: 64 UG/DL
KETONES URINE: NEGATIVE
LDLC SERPL CALC-MCNC: 93 MG/DL
LEUKOCYTE ESTERASE URINE: NEGATIVE
LYMPHOCYTES # BLD AUTO: 2.58 K/UL
LYMPHOCYTES NFR BLD AUTO: 32.6 %
MAGNESIUM SERPL-MCNC: 2.2 MG/DL
MAN DIFF?: NORMAL
MCHC RBC-ENTMCNC: 29.8 PG
MCHC RBC-ENTMCNC: 33.3 GM/DL
MCV RBC AUTO: 89.5 FL
MICROSCOPIC-UA: NORMAL
MONOCYTES # BLD AUTO: 0.87 K/UL
MONOCYTES NFR BLD AUTO: 11 %
NEUTROPHILS # BLD AUTO: 4.26 K/UL
NEUTROPHILS NFR BLD AUTO: 53.9 %
NITRITE URINE: NEGATIVE
NONHDLC SERPL-MCNC: 124 MG/DL
PH URINE: 5.5
PHOSPHATE SERPL-MCNC: 3 MG/DL
PLATELET # BLD AUTO: 252 K/UL
POTASSIUM SERPL-SCNC: 4.4 MMOL/L
PROT SERPL-MCNC: 7.7 G/DL
PROT UR-MCNC: 17 MG/DL
PROTEIN URINE: NEGATIVE
RBC # BLD: 4.97 M/UL
RBC # FLD: 13.2 %
RED BLOOD CELLS URINE: 0 /HPF
RHEUMATOID FACT SER QL: <10 IU/ML
SODIUM SERPL-SCNC: 143 MMOL/L
SPECIFIC GRAVITY URINE: 1.02
SQUAMOUS EPITHELIAL CELLS: 0 /HPF
T3FREE SERPL-MCNC: 3.05 PG/ML
T3RU NFR SERPL: 1 TBI
T4 FREE SERPL-MCNC: 1.1 NG/DL
T4 SERPL-MCNC: 4.8 UG/DL
THYROGLOB AB SERPL-ACNC: <20 IU/ML
THYROPEROXIDASE AB SERPL IA-ACNC: <10 IU/ML
TIBC SERPL-MCNC: 309 UG/DL
TRIGL SERPL-MCNC: 154 MG/DL
TSH SERPL-ACNC: 1.08 UIU/ML
UIBC SERPL-MCNC: 245 UG/DL
URATE SERPL-MCNC: 9 MG/DL
UROBILINOGEN URINE: NORMAL
VIT B12 SERPL-MCNC: 800 PG/ML
WBC # FLD AUTO: 7.91 K/UL
WHITE BLOOD CELLS URINE: 0 /HPF

## 2022-04-07 LAB
ALBUMIN MFR SERPL ELPH: 56.7 %
ALBUMIN SERPL-MCNC: 4.4 G/DL
ALBUMIN/GLOB SERPL: 1.3 RATIO
ALPHA1 GLOB MFR SERPL ELPH: 3 %
ALPHA1 GLOB SERPL ELPH-MCNC: 0.2 G/DL
ALPHA2 GLOB MFR SERPL ELPH: 8 %
ALPHA2 GLOB SERPL ELPH-MCNC: 0.6 G/DL
B-GLOBULIN MFR SERPL ELPH: 11.5 %
B-GLOBULIN SERPL ELPH-MCNC: 0.9 G/DL
DEPRECATED KAPPA LC FREE/LAMBDA SER: 2.36 RATIO
GAMMA GLOB FLD ELPH-MCNC: 1.6 G/DL
GAMMA GLOB MFR SERPL ELPH: 20.8 %
IGA SER QL IEP: 190 MG/DL
IGG SER QL IEP: 1788 MG/DL
IGM SER QL IEP: 78 MG/DL
INTERPRETATION SERPL IEP-IMP: NORMAL
KAPPA LC CSF-MCNC: 2.13 MG/DL
KAPPA LC SERPL-MCNC: 5.03 MG/DL
M PROTEIN SPEC IFE-MCNC: NORMAL
METHYLMALONATE SERPL-SCNC: 181 NMOL/L
PROT SERPL-MCNC: 7.7 G/DL
PROT SERPL-MCNC: 7.7 G/DL

## 2022-05-27 NOTE — CHART NOTE - NSREFPHYEXREFERTOOTHER_GEN_ALL_CORE
Joyce Dolan is returning the call and can be reached at [phone]. She is jumping in shower please give her an hour. None

## 2022-06-13 ENCOUNTER — APPOINTMENT (OUTPATIENT)
Dept: NEPHROLOGY | Facility: CLINIC | Age: 47
End: 2022-06-13
Payer: COMMERCIAL

## 2022-06-13 VITALS
BODY MASS INDEX: 1264.57 KG/M2 | DIASTOLIC BLOOD PRESSURE: 78 MMHG | SYSTOLIC BLOOD PRESSURE: 116 MMHG | HEIGHT: 60 IN | HEART RATE: 88 BPM

## 2022-06-13 VITALS — OXYGEN SATURATION: 98 % | WEIGHT: 259 LBS | TEMPERATURE: 98.4 F | BODY MASS INDEX: 33.25 KG/M2 | HEART RATE: 98 BPM

## 2022-06-13 VITALS — DIASTOLIC BLOOD PRESSURE: 84 MMHG | SYSTOLIC BLOOD PRESSURE: 110 MMHG | HEART RATE: 84 BPM

## 2022-06-13 VITALS — HEART RATE: 84 BPM | SYSTOLIC BLOOD PRESSURE: 108 MMHG | DIASTOLIC BLOOD PRESSURE: 80 MMHG

## 2022-06-13 DIAGNOSIS — M77.11 LATERAL EPICONDYLITIS, RIGHT ELBOW: ICD-10-CM

## 2022-06-13 DIAGNOSIS — M48.9 SPONDYLOPATHY, UNSPECIFIED: ICD-10-CM

## 2022-06-13 PROCEDURE — 99214 OFFICE O/P EST MOD 30 MIN: CPT | Mod: 25

## 2022-06-13 NOTE — HISTORY OF PRESENT ILLNESS
[FreeTextEntry1] : The patient is a 47 year old male presenting for f/u evaluation and active reassessment of HTN, gout, cervical radiculopathy with chronic back pain, h/o post-strep GN, proteinuria, microhematuria, and CRI. Last seen March 2022.\par \par The patient is feeling generally well. He c/o recent right forearm pain up to his elbow that disrupts normal daily activity such as holding a coffee cup. 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

## 2022-06-13 NOTE — ASSESSMENT
[FreeTextEntry1] : Plan:\par 1) HTN: BP acceptable today on no medical therapy. Will therefore continue without antihypertensives and reassess pressure at next evaluation. \par 2) CRI: last creatinine of 1.26, eGFR of 70; will continue to monitor function with CMP due to risk for progression of renal failure. \par 3) Vitamin D deficiency: Last Vit D 25-OH of 24.5 is low. Will recheck levels on labs today.\par 4) Right forearm pain: Will refer to Dr. Be for orthopedic evaluation for suspected epicondylitis. \par \par No changes to medications. \par \par Labs were drawn and patient will return in 2-3 months for a follow-up appointment. \par \par

## 2022-06-13 NOTE — ADDENDUM
[FreeTextEntry1] : All medical record entries made by the Scribe were at my, SUZETTE Vaughan's direction and personally dictated by me on 06/13/2022. I have received the chart and agree that the record accurately reflects my personal performance of the history, physical exam, assessment, and plan. I have also personally directed, reviewed, and agreed with the chart.

## 2022-06-13 NOTE — PHYSICAL EXAM
[General Appearance - Alert] : alert [Sclera] : the sclera and conjunctiva were normal [General Appearance - In No Acute Distress] : in no acute distress [PERRL With Normal Accommodation] : pupils were equal in size, round, and reactive to light [Extraocular Movements] : extraocular movements were intact [Outer Ear] : the ears and nose were normal in appearance [Hearing Threshold Finger Rub Not [place]] : hearing was normal [Neck Appearance] : the appearance of the neck was normal [Neck Cervical Mass (___cm)] : no neck mass was observed [Jugular Venous Distention Increased] : there was no jugular-venous distention [Thyroid Diffuse Enlargement] : the thyroid was not enlarged [Thyroid Nodule] : there were no palpable thyroid nodules [Auscultation Breath Sounds / Voice Sounds] : lungs were clear to auscultation bilaterally [Heart Rate And Rhythm] : heart rate was normal and rhythm regular [Heart Sounds] : normal S1 and S2 [Heart Sounds Gallop] : no gallops [Murmurs] : no murmurs [Heart Sounds Pericardial Friction Rub] : no pericardial rub [Edema] : there was no peripheral edema [Bowel Sounds] : normal bowel sounds [Abdomen Soft] : soft [Abdomen Tenderness] : non-tender [Abdomen Mass (___ Cm)] : no abdominal mass palpated [No CVA Tenderness] : no ~M costovertebral angle tenderness [No Spinal Tenderness] : no spinal tenderness [Abnormal Walk] : normal gait [Musculoskeletal - Swelling] : no joint swelling seen [Involuntary Movements] : no involuntary movements were seen [Motor Tone] : muscle strength and tone were normal [Skin Color & Pigmentation] : normal skin color and pigmentation [Skin Turgor] : normal skin turgor [] : no rash [No Focal Deficits] : no focal deficits [Oriented To Time, Place, And Person] : oriented to person, place, and time [Impaired Insight] : insight and judgment were intact [Affect] : the affect was normal [FreeTextEntry1] : Right lateral epicondyle was moderately tender

## 2022-06-13 NOTE — END OF VISIT
[Time Spent: ___ minutes] : I have spent [unfilled] minutes of time on the encounter. [FreeTextEntry3] : Documented by Svetlana Morales acting as a scribe for SUZETTE Vaughan on 06/13/2022.

## 2022-06-21 ENCOUNTER — APPOINTMENT (OUTPATIENT)
Dept: ORTHOPEDIC SURGERY | Facility: CLINIC | Age: 47
End: 2022-06-21

## 2022-07-14 ENCOUNTER — OUTPATIENT (OUTPATIENT)
Dept: OUTPATIENT SERVICES | Facility: HOSPITAL | Age: 47
LOS: 1 days | End: 2022-07-14

## 2022-07-14 ENCOUNTER — RESULT REVIEW (OUTPATIENT)
Age: 47
End: 2022-07-14

## 2022-07-14 ENCOUNTER — APPOINTMENT (OUTPATIENT)
Dept: ULTRASOUND IMAGING | Facility: CLINIC | Age: 47
End: 2022-07-14

## 2022-07-14 PROCEDURE — 76882 US LMTD JT/FCL EVL NVASC XTR: CPT | Mod: 26,RT

## 2022-09-08 ENCOUNTER — APPOINTMENT (OUTPATIENT)
Dept: NEPHROLOGY | Facility: CLINIC | Age: 47
End: 2022-09-08

## 2022-10-17 ENCOUNTER — APPOINTMENT (OUTPATIENT)
Dept: RHEUMATOLOGY | Facility: CLINIC | Age: 47
End: 2022-10-17

## 2022-10-25 ENCOUNTER — APPOINTMENT (OUTPATIENT)
Dept: NEPHROLOGY | Facility: CLINIC | Age: 47
End: 2022-10-25

## 2022-10-25 NOTE — HISTORY OF PRESENT ILLNESS
[FreeTextEntry1] : The patient is a 47 year male being seen for a reassessment of f/u evaluation and active reassessment of HTN, gout, cervical radiculopathy with chronic back pain, h/o post-strep GN, proteinuria, microhematuria, and CRI. Last seen March 2022.\par \par Labs: 03/28/2022\par T protein, U random 17, WBC 7.91, RBC 4.97, HGB 14.8, HCT 44.5%, Triglyceride 154, HDL 39, CO2 23, Glucose 71, BUN 20, Creatinine 1.26, AST 52, eGFR 70, Uric acid 9.0, HGB A1C 5.7%, venessa 1:80\par \par Current medications: allopurinol 100mg QD\par

## 2022-11-30 ENCOUNTER — APPOINTMENT (OUTPATIENT)
Dept: NEPHROLOGY | Facility: CLINIC | Age: 47
End: 2022-11-30

## 2022-11-30 ENCOUNTER — LABORATORY RESULT (OUTPATIENT)
Age: 47
End: 2022-11-30

## 2022-11-30 VITALS — HEART RATE: 84 BPM | DIASTOLIC BLOOD PRESSURE: 74 MMHG | SYSTOLIC BLOOD PRESSURE: 112 MMHG

## 2022-11-30 VITALS — HEART RATE: 72 BPM | DIASTOLIC BLOOD PRESSURE: 80 MMHG | SYSTOLIC BLOOD PRESSURE: 120 MMHG

## 2022-11-30 VITALS — WEIGHT: 255 LBS | BODY MASS INDEX: 1245.04 KG/M2

## 2022-11-30 DIAGNOSIS — Z87.448 PERSONAL HISTORY OF OTHER DISEASES OF URINARY SYSTEM: ICD-10-CM

## 2022-11-30 DIAGNOSIS — N18.2 CHRONIC KIDNEY DISEASE, STAGE 2 (MILD): ICD-10-CM

## 2022-11-30 PROCEDURE — 99214 OFFICE O/P EST MOD 30 MIN: CPT | Mod: 25

## 2022-11-30 NOTE — ASSESSMENT
[FreeTextEntry1] : \par ROSELINE DAMON was seen for a follow up on Nov 30 2022 12:40PM.\par \par 1) The patient's bp was stable and his physical exam was satisfactory. \par \par 2) The patient looks well, feels well, and has no recurring symptoms. \par \par 3) We will check his labs and if there is no substantive change he will return in 3-4 months.

## 2022-11-30 NOTE — PHYSICAL EXAM
[General Appearance - Alert] : alert [General Appearance - In No Acute Distress] : in no acute distress [Sclera] : the sclera and conjunctiva were normal [Extraocular Movements] : extraocular movements were intact [Outer Ear] : the ears and nose were normal in appearance [Hearing Threshold Finger Rub Not [place]] : hearing was normal [Neck Appearance] : the appearance of the neck was normal [Neck Cervical Mass (___cm)] : no neck mass was observed [Auscultation Breath Sounds / Voice Sounds] : lungs were clear to auscultation bilaterally [Lungs Percussion] : the lungs were normal to percussion [Heart Rate And Rhythm] : heart rate was normal and rhythm regular [Heart Sounds] : normal S1 and S2 [Heart Sounds Gallop] : no gallops [Murmurs] : no murmurs [Heart Sounds Pericardial Friction Rub] : no pericardial rub [Edema] : there was no peripheral edema [Bowel Sounds] : normal bowel sounds [Abdomen Soft] : soft [Abdomen Tenderness] : non-tender [] : no hepato-splenomegaly [Abdomen Mass (___ Cm)] : no abdominal mass palpated [Cervical Lymph Nodes Enlarged Posterior Bilaterally] : posterior cervical [Cervical Lymph Nodes Enlarged Anterior Bilaterally] : anterior cervical [Supraclavicular Lymph Nodes Enlarged Bilaterally] : supraclavicular [Axillary Lymph Nodes Enlarged Bilaterally] : axillary [Femoral Lymph Nodes Enlarged Bilaterally] : femoral [Inguinal Lymph Nodes Enlarged Bilaterally] : inguinal [Abnormal Walk] : normal gait [Motor Tone] : muscle strength and tone were normal [No Focal Deficits] : no focal deficits [Oriented To Time, Place, And Person] : oriented to person, place, and time [Impaired Insight] : insight and judgment were intact [Affect] : the affect was normal [FreeTextEntry1] : alert, awake, no focal deficits

## 2022-11-30 NOTE — HISTORY OF PRESENT ILLNESS
[FreeTextEntry1] : The patient is a 47 year male being seen for a follow up. He has a previous history of post-strep GN, proteinuria, microhematuria, and CRI. Last seen October 2022.\par \par Patient has no changed in symptoms or status and feels well without any recent complains. The patient has been immunized against influenza. We have no labs since 6/13/22.\par \par Labs: 03/28/2022\par T protein, U random 17, WBC 7.91, RBC 4.97, HGB 14.8, HCT 44.5%, Triglyceride 154, HDL 39, CO2 23, Glucose 71, BUN 20, Creatinine 1.26, AST 52, eGFR 70, Uric acid 9.0, HGB A1C 5.7%, venessa 1:80\par \par Current medications: allopurinol 100mg QD\par

## 2022-12-04 LAB
24R-OH-CALCIDIOL SERPL-MCNC: 34.8 PG/ML
25(OH)D3 SERPL-MCNC: 24.1 NG/ML
ALBUMIN SERPL ELPH-MCNC: 4.7 G/DL
ALDOSTERONE SERUM: 12 NG/DL
ALP BLD-CCNC: 87 U/L
ALP BONE SERPL-MCNC: 26.9 UG/L
ALT SERPL-CCNC: 38 U/L
ANION GAP SERPL CALC-SCNC: 13 MMOL/L
APPEARANCE: CLEAR
ASO AB SER LA-ACNC: 57 IU/ML
AST SERPL-CCNC: 52 U/L
BACTERIA: NEGATIVE
BASOPHILS # BLD AUTO: 0.04 K/UL
BASOPHILS NFR BLD AUTO: 0.6 %
BILIRUB SERPL-MCNC: 0.9 MG/DL
BILIRUBIN URINE: NEGATIVE
BLOOD URINE: NEGATIVE
BUN SERPL-MCNC: 15 MG/DL
C3 SERPL-MCNC: 152 MG/DL
C4 SERPL-MCNC: 25 MG/DL
CALCIUM SERPL-MCNC: 10 MG/DL
CALCIUM SERPL-MCNC: 10 MG/DL
CHLORIDE SERPL-SCNC: 105 MMOL/L
CHOLEST SERPL-MCNC: 182 MG/DL
CO2 SERPL-SCNC: 21 MMOL/L
COLOR: NORMAL
CREAT SERPL-MCNC: 1.17 MG/DL
CREAT SPEC-SCNC: 113 MG/DL
CREAT SPEC-SCNC: 113 MG/DL
CREAT/PROT UR: 0.1 RATIO
CRP SERPL-MCNC: <3 MG/L
CYSTATIN C SERPL-MCNC: 1.13 MG/L
EGFR: 76 ML/MIN/1.73M2
EOSINOPHIL # BLD AUTO: 0.14 K/UL
EOSINOPHIL NFR BLD AUTO: 2 %
ERYTHROCYTE [SEDIMENTATION RATE] IN BLOOD BY WESTERGREN METHOD: 31 MM/HR
ESTIMATED AVERAGE GLUCOSE: 126 MG/DL
FERRITIN SERPL-MCNC: 82 NG/ML
FOLATE SERPL-MCNC: 19.3 NG/ML
GFR/BSA.PRED SERPLBLD CYS-BASED-ARV: 69 ML/MIN/1.73M2
GLUCOSE QUALITATIVE U: NEGATIVE
GLUCOSE SERPL-MCNC: 85 MG/DL
HBA1C MFR BLD HPLC: 6 %
HBV SURFACE AB SER QL: NONREACTIVE
HBV SURFACE AG SER QL: NONREACTIVE
HCT VFR BLD CALC: 43.5 %
HCV AB SER QL: NONREACTIVE
HCV S/CO RATIO: 0.18 S/CO
HCYS SERPL-MCNC: 10.1 UMOL/L
HDLC SERPL-MCNC: 36 MG/DL
HGB BLD-MCNC: 14.6 G/DL
HYALINE CASTS: 0 /LPF
IMM GRANULOCYTES NFR BLD AUTO: 0.3 %
IRON SATN MFR SERPL: 25 %
IRON SERPL-MCNC: 86 UG/DL
KETONES URINE: NEGATIVE
LDLC SERPL CALC-MCNC: 102 MG/DL
LEUKOCYTE ESTERASE URINE: NEGATIVE
LYMPHOCYTES # BLD AUTO: 2.47 K/UL
LYMPHOCYTES NFR BLD AUTO: 35.8 %
MAGNESIUM SERPL-MCNC: 2.1 MG/DL
MAN DIFF?: NORMAL
MCHC RBC-ENTMCNC: 29.7 PG
MCHC RBC-ENTMCNC: 33.6 GM/DL
MCV RBC AUTO: 88.6 FL
METHYLMALONATE SERPL-SCNC: 186 NMOL/L
MICROALBUMIN 24H UR DL<=1MG/L-MCNC: 3.8 MG/DL
MICROALBUMIN/CREAT 24H UR-RTO: 34 MG/G
MICROSCOPIC-UA: NORMAL
MONOCYTES # BLD AUTO: 0.54 K/UL
MONOCYTES NFR BLD AUTO: 7.8 %
NEUTROPHILS # BLD AUTO: 3.69 K/UL
NEUTROPHILS NFR BLD AUTO: 53.5 %
NITRITE URINE: NEGATIVE
NONHDLC SERPL-MCNC: 146 MG/DL
NT-PROBNP SERPL-MCNC: 20 PG/ML
PARATHYROID HORMONE INTACT: 26 PG/ML
PH URINE: 5.5
PHOSPHATE SERPL-MCNC: 3.6 MG/DL
PLATELET # BLD AUTO: 277 K/UL
POTASSIUM SERPL-SCNC: 4.4 MMOL/L
PROT SERPL-MCNC: 8.2 G/DL
PROT UR-MCNC: 9 MG/DL
PROTEIN URINE: NORMAL
RBC # BLD: 4.91 M/UL
RBC # FLD: 13.7 %
RED BLOOD CELLS URINE: 2 /HPF
RHEUMATOID FACT SER QL: <10 IU/ML
SODIUM SERPL-SCNC: 139 MMOL/L
SPECIFIC GRAVITY URINE: 1.02
SQUAMOUS EPITHELIAL CELLS: 0 /HPF
T3FREE SERPL-MCNC: 3.26 PG/ML
T3RU NFR SERPL: 0.9 TBI
T4 FREE SERPL-MCNC: 1.3 NG/DL
T4 SERPL-MCNC: 5.6 UG/DL
THYROGLOB AB SERPL-ACNC: <20 IU/ML
THYROPEROXIDASE AB SERPL IA-ACNC: <10 IU/ML
TIBC SERPL-MCNC: 349 UG/DL
TRIGL SERPL-MCNC: 222 MG/DL
TSH SERPL-ACNC: 1.58 UIU/ML
UIBC SERPL-MCNC: 263 UG/DL
URATE SERPL-MCNC: 8.7 MG/DL
UROBILINOGEN URINE: NORMAL
VIT B12 SERPL-MCNC: 839 PG/ML
WBC # FLD AUTO: 6.9 K/UL
WHITE BLOOD CELLS URINE: 0 /HPF

## 2022-12-08 ENCOUNTER — APPOINTMENT (OUTPATIENT)
Dept: RHEUMATOLOGY | Facility: CLINIC | Age: 47
End: 2022-12-08

## 2022-12-31 LAB
ANA PAT FLD IF-IMP: ABNORMAL
ANA SER IF-ACNC: ABNORMAL
COLLAGEN CTX SERPL-MCNC: 417 PG/ML

## 2023-01-19 NOTE — ED ADULT NURSE NOTE - CHPI ED NUR SYMPTOMS NEG
No
no chills/no shortness of breath/no fever/no syncope/no congestion/no dizziness/no back pain/no vomiting/no diaphoresis/no nausea

## 2023-01-27 NOTE — ED ADULT NURSE NOTE - NSFALLRSKOUTCOME_ED_ALL_ED
Regarding: fall onto knee that received surgery, no pain  ----- Message from Rao Momin sent at 1/27/2023  1:14 PM CST -----  Patient Name: Saundra Robins    Specialist or PCP Name: Doctor Meliton Toscano    Symptoms: fall onto knee that received surgery, no pain    Pregnant (females aged 13-60. If Yes, how long?) : n/a    Call Back # : On File    Which State are you currently located in?: [place]    Name of Clinic Site / Acct# : [facility]    Use following scripting for patients waiting for a callback:   \"Nurse callback times vary based on call volumes; please be aware the return phone call may come from an unidentified phone number. If your symptoms worsen or become life threatening while waiting, you should seek immediate assistance by calling 911 or going to the ER for evaluation.\"     Universal Safety Interventions

## 2023-01-28 ENCOUNTER — EMERGENCY (EMERGENCY)
Facility: HOSPITAL | Age: 48
LOS: 1 days | Discharge: HOME | End: 2023-01-28
Admitting: STUDENT IN AN ORGANIZED HEALTH CARE EDUCATION/TRAINING PROGRAM
Payer: COMMERCIAL

## 2023-01-28 VITALS
TEMPERATURE: 99 F | SYSTOLIC BLOOD PRESSURE: 134 MMHG | HEIGHT: 74 IN | DIASTOLIC BLOOD PRESSURE: 82 MMHG | OXYGEN SATURATION: 98 % | HEART RATE: 72 BPM | WEIGHT: 250 LBS | RESPIRATION RATE: 16 BRPM

## 2023-01-28 PROCEDURE — 73090 X-RAY EXAM OF FOREARM: CPT | Mod: 26,LT

## 2023-01-28 PROCEDURE — 99284 EMERGENCY DEPT VISIT MOD MDM: CPT

## 2023-01-28 PROCEDURE — 73080 X-RAY EXAM OF ELBOW: CPT

## 2023-01-28 PROCEDURE — 73080 X-RAY EXAM OF ELBOW: CPT | Mod: 26,LT

## 2023-01-28 PROCEDURE — 73090 X-RAY EXAM OF FOREARM: CPT

## 2023-01-28 PROCEDURE — 96372 THER/PROPH/DIAG INJ SC/IM: CPT

## 2023-01-28 RX ORDER — KETOROLAC TROMETHAMINE 30 MG/ML
15 SYRINGE (ML) INJECTION ONCE
Refills: 0 | Status: DISCONTINUED | OUTPATIENT
Start: 2023-01-28 | End: 2023-01-28

## 2023-01-28 RX ADMIN — Medication 15 MILLIGRAM(S): at 13:12

## 2023-01-28 RX ADMIN — Medication 15 MILLIGRAM(S): at 13:27

## 2023-01-28 NOTE — ED PROVIDER NOTE - CARE PROVIDER_API CALL
Woodrow Kwon)  Orthopaedic Surgery Surgery  [address] [place] [address]  Phone: [phone]  Fax: [phone]  Follow Up Time:

## 2023-01-28 NOTE — ED PROVIDER NOTE - OBJECTIVE STATEMENT
48yo RHD male presents with L elbow and forearm pain x2 days. Pt reports he struck his forearm against a fridge. Denies other injury. Now endorsing pain over the lateral elbow with limited ROM. States this feels different than prior gout flares. Took advil yesterday, no pain medication today. He denies numbness or weakness.

## 2023-01-28 NOTE — ED PROVIDER NOTE - NSFOLLOWUPINSTRUCTIONS_ED_ALL_ED_FT
Take naproxen twice daily for pain. Apply ice packs when resting.     Wear counterforce brace for support.    Perform stretching/strengthening exercises.    Rest the arm as much as possible.    See below for orthopedic follow up information. Please reach out to Lissette Hogue ([facility] ED clinical referral coordinator) to assist you with your follow-up appointment.     Monday - Friday 11am-7pm  [phone]  [email or web address]

## 2023-01-28 NOTE — ED PROVIDER NOTE - CLINICAL SUMMARY MEDICAL DECISION MAKING FREE TEXT BOX
Hemodynamically stable. He has ttp over lateral epicondyle. There is no joint effusion, warmth or erythema. SANDERE NVI. Suspect lateral epicondylitis as he has point tenderness and performs repetitive motions as a . He has the history of trauma but there is no ecchymosis or swelling to suggest contusion and XR negative for fracture. Low suspicion for gout flare. Given toradol IM in ED. Discussed supportive care options. Will refer to orthopedics if sx fail to improve.

## 2023-01-28 NOTE — ED PROVIDER NOTE - PATIENT PORTAL LINK FT
You can access the FollowMyHealth Patient Portal offered by [facility] by registering at the following website: [email or web address]. By joining [employer]’s FollowMyHealth portal, you will also be able to view your health information using other applications (apps) compatible with our system.

## 2023-01-28 NOTE — ED ADULT TRIAGE NOTE - OTHER COMPLAINTS
pt states hit left hand on a refrigerator at work 2 days ago.  pt states left hand is swollen today . 2+ left radial pulse.

## 2023-01-28 NOTE — ED PROVIDER NOTE - PHYSICAL EXAMINATION
VITAL SIGNS: I have reviewed nursing notes and confirm.  CONSTITUTIONAL: Well-developed; well-nourished; in no acute distress.   SKIN:  warm and dry, no acute rash.   HEAD:  normocephalic, atraumatic.  EYES: PERRL, EOM intact; conjunctiva and sclera clear.  ENT: No nasal discharge; airway clear.   NECK: Supple; non tender.  CARD: S1, S2 normal; no murmurs, gallops, or rubs. Regular rate and rhythm.   RESP:  Clear to auscultation b/l, no wheezes, rales or rhonchi.  ABD: Normal bowel sounds; soft; non-distended; non-tender; no guarding/ rebound.  EXT: Normal ROM. No clubbing, cyanosis or edema. 2+ pulses to b/l ue/le.  NEURO: Alert, oriented, grossly unremarkable  PSYCH: Cooperative, mood and affect appropriate.    LUE: FROM shoulder without bony tenderness. No elbow swelling, warmth or erythema. He has point tenderness over lateral epicondyle, non-tender to palpation of medial epicondyle. ROM limited by pain. He has no bony forearm tenderness, no swelling. FROM of wrist and fingers. Sensation and strength intact in median, radial and ulnar nerves distributions. 2+ radial pulse.

## 2023-01-28 NOTE — ED ADULT NURSE NOTE - OBJECTIVE STATEMENT
pt states 2 days ago hit left hand on a refrigerator while at work.  pt states today the left hand is swollen.  + swelling noted to left hand/fingers.  2+ left radial pulse, cap refill brisk.  able to move all left fingers, but causes increased pain.    no rings on left hand.

## 2023-01-30 DIAGNOSIS — M10.9 GOUT, UNSPECIFIED: ICD-10-CM
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
## 2023-01-30 DIAGNOSIS — M77.12 LATERAL EPICONDYLITIS, LEFT ELBOW: ICD-10-CM
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
## 2023-01-30 DIAGNOSIS — M25.522 PAIN IN LEFT ELBOW: ICD-10-CM

## 2023-01-30 DIAGNOSIS — I10 ESSENTIAL (PRIMARY) HYPERTENSION: ICD-10-CM

## 2023-01-30 DIAGNOSIS — Z88.8 ALLERGY STATUS TO OTHER DRUGS, MEDICAMENTS AND BIOLOGICAL SUBSTANCES: ICD-10-CM

## 2023-03-30 NOTE — PATIENT PROFILE ADULT - BRADEN SENSORY
Patient: Estephania Verma    Procedure: Procedure(s):  ESOPHAGOGASTRODUODENOSCOPY, WITH BIOPSY       Anesthesia Type:  General    Note:  Disposition: Outpatient   Postop Pain Control: Uneventful            Sign Out: Well controlled pain   PONV: No   Neuro/Psych: Uneventful            Sign Out: Acceptable/Baseline neuro status   Airway/Respiratory: Uneventful            Sign Out: Acceptable/Baseline resp. status   CV/Hemodynamics: Uneventful            Sign Out: Acceptable CV status; No obvious hypovolemia; No obvious fluid overload   Other NRE:    DID A NON-ROUTINE EVENT OCCUR? No    Event details/Postop Comments:  - Uneventful, comfortable  - ready for discharge           Last vitals:  Vitals Value Taken Time   /77 03/30/23 1330   Temp 36.6  C (97.8  F) 03/30/23 1330   Pulse 81 03/30/23 1330   Resp 22 03/30/23 1330   SpO2 96 % 03/30/23 1330       Electronically Signed By: Smooth Hernandez MD  March 30, 2023  1:54 PM   (4) no impairment

## 2023-06-13 ENCOUNTER — LABORATORY RESULT (OUTPATIENT)
Age: 48
End: 2023-06-13

## 2023-06-13 ENCOUNTER — APPOINTMENT (OUTPATIENT)
Dept: NEPHROLOGY | Facility: CLINIC | Age: 48
End: 2023-06-13
Payer: COMMERCIAL

## 2023-06-13 DIAGNOSIS — I10 ESSENTIAL (PRIMARY) HYPERTENSION: ICD-10-CM

## 2023-06-13 DIAGNOSIS — R63.5 ABNORMAL WEIGHT GAIN: ICD-10-CM

## 2023-06-13 DIAGNOSIS — E79.0 HYPERURICEMIA W/OUT SIGNS OF INFLAMMATORY ARTHRITIS AND TOPHACEOUS DISEASE: ICD-10-CM

## 2023-06-13 DIAGNOSIS — R80.9 PROTEINURIA, UNSPECIFIED: ICD-10-CM

## 2023-06-13 PROCEDURE — 99214 OFFICE O/P EST MOD 30 MIN: CPT | Mod: 25

## 2023-06-13 PROCEDURE — 36415 COLL VENOUS BLD VENIPUNCTURE: CPT

## 2023-07-05 LAB
ALBUMIN MFR SERPL ELPH: 55.8 %
ALBUMIN SERPL ELPH-MCNC: 4.4 G/DL
ALBUMIN SERPL-MCNC: 4.3 G/DL
ALBUMIN/GLOB SERPL: 1.3 RATIO
ALP BLD-CCNC: 99 U/L
ALPHA1 GLOB MFR SERPL ELPH: 3.5 %
ALPHA1 GLOB SERPL ELPH-MCNC: 0.3 G/DL
ALPHA2 GLOB MFR SERPL ELPH: 8 %
ALPHA2 GLOB SERPL ELPH-MCNC: 0.6 G/DL
ALT SERPL-CCNC: 38 U/L
ANION GAP SERPL CALC-SCNC: 13 MMOL/L
APPEARANCE: CLEAR
AST SERPL-CCNC: 46 U/L
B-GLOBULIN MFR SERPL ELPH: 11 %
B-GLOBULIN SERPL ELPH-MCNC: 0.8 G/DL
BACTERIA: NEGATIVE /HPF
BILIRUB SERPL-MCNC: 0.4 MG/DL
BILIRUBIN URINE: NEGATIVE
BLOOD URINE: NEGATIVE
BUN SERPL-MCNC: 16 MG/DL
CALCIUM SERPL-MCNC: 9.6 MG/DL
CAST: 0 /LPF
CHLORIDE SERPL-SCNC: 107 MMOL/L
CHOLEST SERPL-MCNC: 189 MG/DL
CO2 SERPL-SCNC: 22 MMOL/L
COLOR: YELLOW
CREAT SERPL-MCNC: 1.21 MG/DL
CREAT SPEC-SCNC: 104 MG/DL
CREAT SPEC-SCNC: 104 MG/DL
CREAT/PROT UR: 0.1 RATIO
CYSTATIN C SERPL-MCNC: 1.1 MG/L
DEPRECATED KAPPA LC FREE/LAMBDA SER: 3.15 RATIO
EGFR: 73 ML/MIN/1.73M2
EPITHELIAL CELLS: 0 /HPF
ESTIMATED AVERAGE GLUCOSE: 120 MG/DL
FERRITIN SERPL-MCNC: 40 NG/ML
FOLATE SERPL-MCNC: >20 NG/ML
GAMMA GLOB FLD ELPH-MCNC: 1.7 G/DL
GAMMA GLOB MFR SERPL ELPH: 21.7 %
GFR/BSA.PRED SERPLBLD CYS-BASED-ARV: 71 ML/MIN/1.73M2
GLUCOSE QUALITATIVE U: NEGATIVE MG/DL
GLUCOSE SERPL-MCNC: 109 MG/DL
HBA1C MFR BLD HPLC: 5.8 %
HCYS SERPL-MCNC: 8.4 UMOL/L
HDLC SERPL-MCNC: 33 MG/DL
IGA SER QL IEP: 185 MG/DL
IGG SER QL IEP: 1767 MG/DL
IGM SER QL IEP: 77 MG/DL
INTERPRETATION SERPL IEP-IMP: NORMAL
IRON SATN MFR SERPL: 16 %
IRON SERPL-MCNC: 52 UG/DL
KAPPA LC CSF-MCNC: 1.99 MG/DL
KAPPA LC SERPL-MCNC: 6.26 MG/DL
KETONES URINE: NEGATIVE MG/DL
LDLC SERPL CALC-MCNC: 77 MG/DL
LEUKOCYTE ESTERASE URINE: NEGATIVE
M PROTEIN SPEC IFE-MCNC: NORMAL
MAGNESIUM SERPL-MCNC: 2.3 MG/DL
METHYLMALONATE SERPL-SCNC: 202 NMOL/L
MICROALBUMIN 24H UR DL<=1MG/L-MCNC: 2.5 MG/DL
MICROALBUMIN/CREAT 24H UR-RTO: 24 MG/G
MICROSCOPIC-UA: NORMAL
NITRITE URINE: NEGATIVE
NONHDLC SERPL-MCNC: 155 MG/DL
PH URINE: 5.5
PHOSPHATE SERPL-MCNC: 2.8 MG/DL
POTASSIUM SERPL-SCNC: 4.3 MMOL/L
PROT SERPL-MCNC: 7.7 G/DL
PROT UR-MCNC: 7 MG/DL
PROTEIN URINE: NEGATIVE MG/DL
RED BLOOD CELLS URINE: 1 /HPF
SODIUM SERPL-SCNC: 142 MMOL/L
SPECIFIC GRAVITY URINE: 1.02
T3FREE SERPL-MCNC: 3.11 PG/ML
T3RU NFR SERPL: 0.9 TBI
T4 FREE SERPL-MCNC: 1.1 NG/DL
T4 SERPL-MCNC: 4.8 UG/DL
THYROGLOB AB SERPL-ACNC: <20 IU/ML
THYROPEROXIDASE AB SERPL IA-ACNC: <10 IU/ML
TIBC SERPL-MCNC: 336 UG/DL
TRIGL SERPL-MCNC: 393 MG/DL
TSH SERPL-ACNC: 1.58 UIU/ML
UIBC SERPL-MCNC: 284 UG/DL
URATE SERPL-MCNC: 8.4 MG/DL
UROBILINOGEN URINE: 0.2 MG/DL
VIT B12 SERPL-MCNC: 698 PG/ML
WHITE BLOOD CELLS URINE: 0 /HPF

## 2023-08-13 ENCOUNTER — EMERGENCY (EMERGENCY)
Facility: HOSPITAL | Age: 48
LOS: 1 days | Discharge: HOME | End: 2023-08-13
Attending: EMERGENCY MEDICINE | Admitting: EMERGENCY MEDICINE
Payer: COMMERCIAL

## 2023-08-13 VITALS
HEIGHT: 74 IN | DIASTOLIC BLOOD PRESSURE: 82 MMHG | HEART RATE: 88 BPM | TEMPERATURE: 98 F | OXYGEN SATURATION: 99 % | RESPIRATION RATE: 18 BRPM | WEIGHT: 259.93 LBS | SYSTOLIC BLOOD PRESSURE: 144 MMHG

## 2023-08-13 LAB
APPEARANCE UR: CLEAR — SIGNIFICANT CHANGE UP
BACTERIA # UR AUTO: PRESENT /HPF
BILIRUB UR-MCNC: NEGATIVE — SIGNIFICANT CHANGE UP
COLOR SPEC: YELLOW — SIGNIFICANT CHANGE UP
DIFF PNL FLD: ABNORMAL
EPI CELLS # UR: SIGNIFICANT CHANGE UP /HPF (ref 0–5)
GLUCOSE UR QL: NEGATIVE — SIGNIFICANT CHANGE UP
KETONES UR-MCNC: NEGATIVE — SIGNIFICANT CHANGE UP
LEUKOCYTE ESTERASE UR-ACNC: ABNORMAL
NITRITE UR-MCNC: NEGATIVE — SIGNIFICANT CHANGE UP
PH UR: 6 — SIGNIFICANT CHANGE UP (ref 5–8)
PROT UR-MCNC: ABNORMAL MG/DL
RBC CASTS # UR COMP ASSIST: > 10 /HPF
SP GR SPEC: 1.02 — SIGNIFICANT CHANGE UP (ref 1–1.03)
UROBILINOGEN FLD QL: 0.2 E.U./DL — SIGNIFICANT CHANGE UP
WBC UR QL: > 10 /HPF

## 2023-08-13 PROCEDURE — 87086 URINE CULTURE/COLONY COUNT: CPT

## 2023-08-13 PROCEDURE — 99284 EMERGENCY DEPT VISIT MOD MDM: CPT

## 2023-08-13 PROCEDURE — 99283 EMERGENCY DEPT VISIT LOW MDM: CPT

## 2023-08-13 PROCEDURE — 87181 SC STD AGAR DILUTION PER AGT: CPT

## 2023-08-13 PROCEDURE — 87186 SC STD MICRODIL/AGAR DIL: CPT

## 2023-08-13 PROCEDURE — 81001 URINALYSIS AUTO W/SCOPE: CPT

## 2023-08-13 RX ADMIN — Medication 1 TABLET(S): at 11:01

## 2023-08-13 NOTE — ED PROVIDER NOTE - CLINICAL SUMMARY MEDICAL DECISION MAKING FREE TEXT BOX
48M PMH HTN, gout, [employer] employee, p/w urinary frequency. States he has 2d of urinary frequency and also feel that the stream of urine is a little harder to come out. No other systemic symptoms.   Vitals wnl, exam as above.  ddx: Possible UTI.   UA, reassess.

## 2023-08-13 NOTE — ED PROVIDER NOTE - PHYSICAL EXAMINATION
no LE edema, normal equal distal pulses, steady unassisted gait.   abd soft NTND    Physical Exam:    CONSTITUTIONAL:  Generally well appearing, no acute distress, alert, awake and oriented  HEENT:  Moist mucous membranes, normal voice  PULM:  No accessory muscle use, speaking full sentences  SKIN:  Normal in appearance, normal color

## 2023-08-13 NOTE — ED PROVIDER NOTE - PATIENT PORTAL LINK FT
You can access the FollowMyHealth Patient Portal offered by [facility] by registering at the following website: [email or web address]. By joining [employer]’s FollowMyHealth portal, you will also be able to view your health information using other applications (apps) compatible with our system.

## 2023-08-13 NOTE — ED PROVIDER NOTE - NSFOLLOWUPINSTRUCTIONS_ED_ALL_ED_FT
Can take tylenol 650mg every 6hrs as needed for pain.    Take antibiotics as prescribed.     Stay well hydrated.    Return for fevers, persistent vomit, uncontrolled pain, worsening breathing, worsening lightheaded, penis or testicle pain/swelling.    Follow up with primary doctor within 1-2 days.     Follow up with urologist for persistent symptoms. Can call [phone] to schedule appointment. Can also follow up at [facility] urology clinic. Can call [phone] to schedule appointment.     Urinary Tract Infection    A urinary tract infection (UTI) is an infection of any part of the urinary tract, which includes the kidneys, ureters, bladder, and urethra. Risk factors include ignoring your need to urinate, wiping back to front if female, being an uncircumcised male, and having diabetes or a weak immune system. Symptoms include frequent urination, pain or burning with urination, foul smelling urine, cloudy urine, pain in the lower abdomen, blood in the urine, and fever. If you were prescribed an antibiotic medicine, take it as told by your health care provider. Do not stop taking the antibiotic even if you start to feel better.    SEEK IMMEDIATE MEDICAL CARE IF YOU HAVE ANY OF THE FOLLOWING SYMPTOMS: severe back or abdominal pain, fever, inability to keep fluids or medicine down, dizziness/lightheadedness, or a change in mental status.

## 2023-08-13 NOTE — ED ADULT NURSE NOTE - NSFALLUNIVINTERV_ED_ALL_ED
Bed/Stretcher in lowest position, wheels locked, appropriate side rails in place/Call bell, personal items and telephone in reach/Instruct patient to call for assistance before getting out of bed/chair/stretcher/Non-slip footwear applied when patient is off stretcher/[place] to call system/Physically safe environment - no spills, clutter or unnecessary equipment/Purposeful proactive rounding/Room/bathroom lighting operational, light cord in reach

## 2023-08-13 NOTE — ED PROVIDER NOTE - OBJECTIVE STATEMENT
48M PMH HTN, gout, [employer] employee, p/w urinary frequency. States he has 2d of urinary frequency and also feel that the stream of urine is a little harder to come out. No other systemic symptoms.   Denies f/c, URI symptoms, SOB/CP, abd pain, testicular/penile pain, penile discharge, dysuria, urine color changes, LE swelling.

## 2023-08-13 NOTE — ED PROVIDER NOTE - PROGRESS NOTE DETAILS
[place]: UA w/ moderate blood, moderate leuk esterase, >10 WBCs, >10 RBCs, bacteria. Will tx for possible UTI. HAS no symptoms to suggest renal colic.   Discussed importance of outpt follow up and return precautions. Clinically no indication for further emergent ED workup or hospitalization at this time. Stable for dc, outpt f/u.

## 2023-08-14 DIAGNOSIS — I10 ESSENTIAL (PRIMARY) HYPERTENSION: ICD-10-CM

## 2023-08-14 DIAGNOSIS — M10.9 GOUT, UNSPECIFIED: ICD-10-CM

## 2023-08-14 DIAGNOSIS — Z88.8 ALLERGY STATUS TO OTHER DRUGS, MEDICAMENTS AND BIOLOGICAL SUBSTANCES: ICD-10-CM

## 2023-08-14 DIAGNOSIS — R35.0 FREQUENCY OF MICTURITION: ICD-10-CM

## 2023-08-14 DIAGNOSIS — N39.0 URINARY TRACT INFECTION, SITE NOT SPECIFIED: ICD-10-CM

## 2023-08-15 LAB
-  DAPTOMYCIN: SIGNIFICANT CHANGE UP
-  LINEZOLID: SIGNIFICANT CHANGE UP
-  OXACILLIN: SIGNIFICANT CHANGE UP
-  RIFAMPIN: SIGNIFICANT CHANGE UP
-  TETRACYCLINE: SIGNIFICANT CHANGE UP
-  TRIMETHOPRIM/SULFAMETHOXAZOLE: SIGNIFICANT CHANGE UP
-  VANCOMYCIN: SIGNIFICANT CHANGE UP
-  VANCOMYCIN: SIGNIFICANT CHANGE UP
CULTURE RESULTS: SIGNIFICANT CHANGE UP
METHOD TYPE: SIGNIFICANT CHANGE UP
METHOD TYPE: SIGNIFICANT CHANGE UP
ORGANISM # SPEC MICROSCOPIC CNT: SIGNIFICANT CHANGE UP
SPECIMEN SOURCE: SIGNIFICANT CHANGE UP

## 2023-10-10 ENCOUNTER — APPOINTMENT (OUTPATIENT)
Dept: UROLOGY | Facility: CLINIC | Age: 48
End: 2023-10-10

## 2023-10-10 ENCOUNTER — NON-APPOINTMENT (OUTPATIENT)
Age: 48
End: 2023-10-10

## 2023-10-15 ENCOUNTER — LABORATORY RESULT (OUTPATIENT)
Age: 48
End: 2023-10-15

## 2023-10-16 ENCOUNTER — APPOINTMENT (OUTPATIENT)
Dept: NEPHROLOGY | Facility: CLINIC | Age: 48
End: 2023-10-16
Payer: COMMERCIAL

## 2023-10-16 VITALS — BODY MASS INDEX: 1264.57 KG/M2 | WEIGHT: 259 LBS

## 2023-10-16 PROCEDURE — 99214 OFFICE O/P EST MOD 30 MIN: CPT | Mod: 25

## 2023-10-16 PROCEDURE — 36415 COLL VENOUS BLD VENIPUNCTURE: CPT

## 2024-02-15 ENCOUNTER — LABORATORY RESULT (OUTPATIENT)
Age: 49
End: 2024-02-15

## 2024-02-15 ENCOUNTER — APPOINTMENT (OUTPATIENT)
Dept: NEPHROLOGY | Facility: CLINIC | Age: 49
End: 2024-02-15
Payer: COMMERCIAL

## 2024-02-15 VITALS — HEIGHT: 74.5 IN | WEIGHT: 255 LBS | BODY MASS INDEX: 32.38 KG/M2

## 2024-02-15 VITALS — DIASTOLIC BLOOD PRESSURE: 85 MMHG | HEART RATE: 73 BPM | SYSTOLIC BLOOD PRESSURE: 128 MMHG

## 2024-02-15 VITALS — HEART RATE: 54 BPM | DIASTOLIC BLOOD PRESSURE: 80 MMHG | SYSTOLIC BLOOD PRESSURE: 134 MMHG

## 2024-02-15 VITALS — SYSTOLIC BLOOD PRESSURE: 128 MMHG | HEART RATE: 57 BPM | DIASTOLIC BLOOD PRESSURE: 82 MMHG

## 2024-02-15 DIAGNOSIS — M19.041 PRIMARY OSTEOARTHRITIS, RIGHT HAND: ICD-10-CM

## 2024-02-15 DIAGNOSIS — N00.9 ACUTE NEPHRITIC SYNDROME WITH UNSPECIFIED MORPHOLOGIC CHANGES: ICD-10-CM

## 2024-02-15 DIAGNOSIS — N17.9 ACUTE KIDNEY FAILURE, UNSPECIFIED: ICD-10-CM

## 2024-02-15 DIAGNOSIS — R09.89 OTHER SPECIFIED SYMPTOMS AND SIGNS INVOLVING THE CIRCULATORY AND RESPIRATORY SYSTEMS: ICD-10-CM

## 2024-02-15 DIAGNOSIS — M19.042 PRIMARY OSTEOARTHRITIS, RIGHT HAND: ICD-10-CM

## 2024-02-15 DIAGNOSIS — M10.9 GOUT, UNSPECIFIED: ICD-10-CM

## 2024-02-15 PROCEDURE — 99214 OFFICE O/P EST MOD 30 MIN: CPT | Mod: 25

## 2024-02-15 PROCEDURE — 36415 COLL VENOUS BLD VENIPUNCTURE: CPT

## 2024-02-15 NOTE — ADDENDUM
[FreeTextEntry1] : Recommended to lose weight to 210-220. Ideal BMI weight is 195. Encouraged to cut out pastries, implement portion control, and implement low impact exercises to his routine. Labs from October indicate increased LDL/HDL levels. Recommended to try a 2-month strict diet and come back for a fasting lipid panel. Will further consult weight management then.   BP/HR taken in different positions (sitting standing and lying down) and d/w pt Self-monitoring at home advised i.e. BP, FS, etc. Medications updated Diet/healthy lifestyle counselling New labs ordered. Follow up in 2 months.

## 2024-02-15 NOTE — RESULTS/DATA
[TextEntry] : PER 10/16/23 LABS: Triglyceride: 395 mg/dL HDL Cholesterol: 33 mg/dL NON-HDL Cholesterol: 146 mg/dL CO2: 20 mmol/L Glucose: 112 mg/dL AST (SGOT): 48 U/L Vitamin D 25 Hydroxy: 24.2 ng/mL WESR: 27 mm/hr T Protein, Urine Random: 31 mg/dL

## 2024-02-15 NOTE — END OF VISIT
[TextEntry] : All medical record entries have been made by the scribe, JESSICA NEGRETE, at Dr. Devon Gutierrez direction and personally dictated by me on 02/15/24. I have received the chart and agree that the record accurately reflects my personal performance of the history, physical exam, assessment, and plan. I have also personally directed, reviewed and agreed with the chart.

## 2024-02-15 NOTE — HISTORY OF PRESENT ILLNESS
[FreeTextEntry1] : 48 year old M w/ PMHX of abnormal transaminases, acute glomerulonephritis, hx of DELMA, arthritis of both hands, atypical chest pain, back pain, cellulitis, cervical spine disease, chronic renal impairments, drug induced myopathy, gout, h/o acute post-streptococcal glomerulonephritis, HTN, hyperuricemia, hypervolemia, labile BP, lateral epicondylitis of right elbow, lumbar herniated disc, neck pain, pain in thoracic spine, proteinuria, and weight gain. Last seen 10/16/23.  Pt. feels fine generally. ROS is negative otherwise. Pt. recently had a colonoscopy.   Seen a urologist at [facility] in January for a UTI.  Reported everything is remarkable. Denies currently experiencing sxs.   Admits pain in his right middle finger. [facility] completed an exam (he's unsure which) and he never returned for his results or further examination.   Denies checking BP himself.   Denies abdominal problems or abdominal pain. Denies experiencing pancreatitis sxs.   Reports eating healthy breakfasts but forgetting to eat during the workday.   Completed stress test at his PCP (Dr. Mason Wolfe). Reports exam was fine.   Admits snoring.  Denies waking up tired. Denies waking himself up from snoring. Has a referral to complete sleep test - will complete soon.

## 2024-02-15 NOTE — PHYSICAL EXAM
[General Appearance - Alert] : alert [General Appearance - In No Acute Distress] : in no acute distress [Auscultation Breath Sounds / Voice Sounds] : lungs were clear to auscultation bilaterally [Heart Rate And Rhythm] : heart rate was normal and rhythm regular [Heart Sounds] : normal S1 and S2 [Heart Sounds Gallop] : no gallops [Murmurs] : no murmurs [Heart Sounds Pericardial Friction Rub] : no pericardial rub [Full Pulse] : the pedal pulses are present [Edema] : there was no peripheral edema [Bowel Sounds] : normal bowel sounds [Abdomen Soft] : soft [Abdomen Tenderness] : non-tender [] : no hepato-splenomegaly [Abdomen Mass (___ Cm)] : no abdominal mass palpated [Abnormal Walk] : normal gait [Nail Clubbing] : no clubbing  or cyanosis of the fingernails [Musculoskeletal - Swelling] : no joint swelling seen [Motor Tone] : muscle strength and tone were normal [Oriented To Time, Place, And Person] : oriented to person, place, and time [Impaired Insight] : insight and judgment were intact [Affect] : the affect was normal [Oropharynx] : the oropharynx was normal [FreeTextEntry1] : + WEARS EYEGLASSES

## 2024-02-16 LAB
24R-OH-CALCIDIOL SERPL-MCNC: 34.2 PG/ML
25(OH)D3 SERPL-MCNC: 53 NG/ML
ALBUMIN SERPL ELPH-MCNC: 4.5 G/DL
ALP BLD-CCNC: 90 U/L
ALT SERPL-CCNC: 40 U/L
ANION GAP SERPL CALC-SCNC: 13 MMOL/L
APPEARANCE: CLEAR
AST SERPL-CCNC: 54 U/L
BACTERIA: NEGATIVE /HPF
BILIRUB SERPL-MCNC: 0.6 MG/DL
BILIRUBIN URINE: NEGATIVE
BLOOD URINE: NEGATIVE
BUN SERPL-MCNC: 16 MG/DL
C3 SERPL-MCNC: 142 MG/DL
C4 SERPL-MCNC: 22 MG/DL
CALCIUM SERPL-MCNC: 9.5 MG/DL
CALCIUM SERPL-MCNC: 9.5 MG/DL
CAST: 0 /LPF
CHLORIDE ?TM UR-SCNC: 77 MMOL/L
CHLORIDE SERPL-SCNC: 104 MMOL/L
CHOLEST SERPL-MCNC: 165 MG/DL
CO2 SERPL-SCNC: 22 MMOL/L
COLOR: YELLOW
CREAT SERPL-MCNC: 1.1 MG/DL
CREAT SPEC-SCNC: 110 MG/DL
CREAT/PROT UR: 0.1 RATIO
CYSTATIN C SERPL-MCNC: 1.14 MG/L
EGFR: 82 ML/MIN/1.73M2
EPITHELIAL CELLS: 0 /HPF
FERRITIN SERPL-MCNC: 67 NG/ML
FOLATE SERPL-MCNC: >20 NG/ML
GFR/BSA.PRED SERPLBLD CYS-BASED-ARV: 68 ML/MIN/1.73M2
GLUCOSE QUALITATIVE U: NEGATIVE MG/DL
GLUCOSE SERPL-MCNC: 88 MG/DL
HBV SURFACE AB SER QL: NONREACTIVE
HBV SURFACE AG SER QL: NONREACTIVE
HCT VFR BLD CALC: 42.8 %
HCV AB SER QL: NONREACTIVE
HCV S/CO RATIO: 0.25 S/CO
HCYS SERPL-MCNC: 9.8 UMOL/L
HDLC SERPL-MCNC: 37 MG/DL
HGB BLD-MCNC: 14.3 G/DL
IRON SATN MFR SERPL: 15 %
IRON SERPL-MCNC: 46 UG/DL
KETONES URINE: NEGATIVE MG/DL
LDLC SERPL CALC-MCNC: 104 MG/DL
LEUKOCYTE ESTERASE URINE: NEGATIVE
MAGNESIUM SERPL-MCNC: 2.1 MG/DL
MCHC RBC-ENTMCNC: 29.1 PG
MCHC RBC-ENTMCNC: 33.4 GM/DL
MCV RBC AUTO: 87.2 FL
MICROSCOPIC-UA: NORMAL
NITRITE URINE: NEGATIVE
NONHDLC SERPL-MCNC: 129 MG/DL
OSMOLALITY UR: 524 MOSM/KG
PARATHYROID HORMONE INTACT: 39 PG/ML
PH URINE: 5.5
PHOSPHATE SERPL-MCNC: 3.7 MG/DL
PLATELET # BLD AUTO: 275 K/UL
POTASSIUM SERPL-SCNC: 4.4 MMOL/L
POTASSIUM UR-SCNC: 45.2 MMOL/L
PROT SERPL-MCNC: 8 G/DL
PROT UR-MCNC: 8 MG/DL
PROTEIN URINE: NEGATIVE MG/DL
RBC # BLD: 4.91 M/UL
RBC # FLD: 13.8 %
RED BLOOD CELLS URINE: 0 /HPF
RHEUMATOID FACT SER QL: <10 IU/ML
SODIUM ?TM SUB UR QN: 89 MMOL/L
SODIUM SERPL-SCNC: 139 MMOL/L
SPECIFIC GRAVITY URINE: 1.01
T3FREE SERPL-MCNC: 3.31 PG/ML
T3RU NFR SERPL: 0.9 TBI
T4 FREE SERPL-MCNC: 1.2 NG/DL
T4 SERPL-MCNC: 5.2 UG/DL
THYROGLOB AB SERPL-ACNC: <20 IU/ML
THYROPEROXIDASE AB SERPL IA-ACNC: <10 IU/ML
TIBC SERPL-MCNC: 318 UG/DL
TRIGL SERPL-MCNC: 140 MG/DL
TSH SERPL-ACNC: 1.47 UIU/ML
UIBC SERPL-MCNC: 272 UG/DL
URATE SERPL-MCNC: 8.8 MG/DL
UROBILINOGEN URINE: 0.2 MG/DL
VIT B12 SERPL-MCNC: 831 PG/ML
WBC # FLD AUTO: 6.62 K/UL
WHITE BLOOD CELLS URINE: 0 /HPF

## 2024-02-19 LAB
ANA PAT FLD IF-IMP: ABNORMAL
ANA SER IF-ACNC: ABNORMAL

## 2024-02-20 LAB
ALBUMIN MFR SERPL ELPH: 56 %
ALBUMIN SERPL-MCNC: 4.5 G/DL
ALBUMIN/GLOB SERPL: 1.3 RATIO
ALP BONE SERPL-MCNC: 26.5 UG/L
ALPHA1 GLOB MFR SERPL ELPH: 3 %
ALPHA1 GLOB SERPL ELPH-MCNC: 0.2 G/DL
ALPHA2 GLOB MFR SERPL ELPH: 7.9 %
ALPHA2 GLOB SERPL ELPH-MCNC: 0.6 G/DL
B-GLOBULIN MFR SERPL ELPH: 10.7 %
B-GLOBULIN SERPL ELPH-MCNC: 0.9 G/DL
DEPRECATED KAPPA LC FREE/LAMBDA SER: 3.5 RATIO
GAMMA GLOB FLD ELPH-MCNC: 1.8 G/DL
GAMMA GLOB MFR SERPL ELPH: 22.4 %
IGA SER QL IEP: 200 MG/DL
IGG SER QL IEP: 2261 MG/DL
IGM SER QL IEP: 85 MG/DL
INTERPRETATION SERPL IEP-IMP: NORMAL
KAPPA LC CSF-MCNC: 2.16 MG/DL
KAPPA LC SERPL-MCNC: 7.57 MG/DL
M PROTEIN SPEC IFE-MCNC: NORMAL
PROT SERPL-MCNC: 8 G/DL
PROT SERPL-MCNC: 8 G/DL

## 2024-02-23 LAB
COLLAGEN CTX SERPL-MCNC: 299 PG/ML
METHYLMALONATE SERPL-SCNC: 144 NMOL/L

## 2024-04-16 ENCOUNTER — APPOINTMENT (OUTPATIENT)
Dept: NEPHROLOGY | Facility: CLINIC | Age: 49
End: 2024-04-16
Payer: COMMERCIAL

## 2024-04-16 VITALS — WEIGHT: 255 LBS | BODY MASS INDEX: 32.3 KG/M2

## 2024-04-16 VITALS — HEART RATE: 73 BPM | SYSTOLIC BLOOD PRESSURE: 130 MMHG | DIASTOLIC BLOOD PRESSURE: 85 MMHG

## 2024-04-16 VITALS — HEART RATE: 58 BPM | SYSTOLIC BLOOD PRESSURE: 131 MMHG | DIASTOLIC BLOOD PRESSURE: 83 MMHG

## 2024-04-16 VITALS — SYSTOLIC BLOOD PRESSURE: 145 MMHG | DIASTOLIC BLOOD PRESSURE: 83 MMHG | HEART RATE: 56 BPM

## 2024-04-16 PROCEDURE — 36415 COLL VENOUS BLD VENIPUNCTURE: CPT

## 2024-04-16 PROCEDURE — 99214 OFFICE O/P EST MOD 30 MIN: CPT | Mod: 25

## 2024-04-16 NOTE — END OF VISIT
[TextEntry] : All medical record entries have been made by the scribe, JESSICA NEGRETE, at Dr. Devon Gutierrez direction and personally dictated by me on 4/16/24. I have received the chart and agree that the record accurately reflects my personal performance of the history, physical exam, assessment, and plan. I have also personally directed, reviewed and agreed with the chart.

## 2024-04-16 NOTE — HISTORY OF PRESENT ILLNESS
[FreeTextEntry1] : 49 year old M w/ PMHX of abnormal transaminases, acute glomerulonephritis, hx of DELMA, arthritis of both hands, atypical chest pain, back pain, cellulitis, cervical spine disease, chronic renal impairments, drug induced myopathy, gout, h/o acute post-streptococcal glomerulonephritis, HTN, hyperuricemia, hypervolemia, labile BP, lateral epicondylitis of right elbow, lumbar herniated disc, neck pain, pain in thoracic spine, proteinuria, and weight gain. Last seen 02/15/24.  Pt. feels fine generally. ROS is negative otherwise. Completed stress test - said everything was fine. Denies chest pain, SOB. Trying to cut down sweets. Reviewed labs with patient - emphasized the importance of maintaining triglycerides within healthy, normal range.   Previously completed colonoscopy because his iron was low. Admits completing endoscopy. Admits acid reflux when he sleeps on the wrong side but it's not an occurrent issue.

## 2024-04-16 NOTE — RESULTS/DATA
[TextEntry] : PER 02/15 LABS: HDL Cholesterol: 37 mg/dL LDL Cholesterol (Calculated): 104 mg/dL AST (SGOT): 54 U/L Gamma: 1.8 g/dL IG mg/dL Immunoglob Kappa F.57 mg/dL Kappa Lambda FLC Ratio: 3.50 Ratio % Saturation, Iron: 15 % Antinuclear Ab (KIRAN)	1:80 Pattern: Speckled Alkaline Phosphatase, Bone: 26.5 ug/L

## 2024-04-16 NOTE — ADDENDUM
[FreeTextEntry1] : Encouraged to re-complete endoscopy and colonoscopy.  Encouraged to increase exercise now that the weather permits it - aim to lose 5lb.  BP/HR taken in different positions (sitting standing and lying down) and d/w pt Self-monitoring at home advised i.e. BP, FS, etc. Medications updated Diet/healthy lifestyle counselling New labs ordered. Follow up in 3-4 months.

## 2024-04-17 LAB
24R-OH-CALCIDIOL SERPL-MCNC: 46.8 PG/ML
25(OH)D3 SERPL-MCNC: 36.2 NG/ML
ALBUMIN MFR SERPL ELPH: 53.9 %
ALBUMIN SERPL ELPH-MCNC: 4.5 G/DL
ALBUMIN SERPL-MCNC: 4.2 G/DL
ALBUMIN/GLOB SERPL: 1.2 RATIO
ALP BLD-CCNC: 94 U/L
ALPHA1 GLOB MFR SERPL ELPH: 2.8 %
ALPHA1 GLOB SERPL ELPH-MCNC: 0.2 G/DL
ALPHA2 GLOB MFR SERPL ELPH: 7.8 %
ALPHA2 GLOB SERPL ELPH-MCNC: 0.6 G/DL
ALT SERPL-CCNC: 32 U/L
ANION GAP SERPL CALC-SCNC: 16 MMOL/L
APPEARANCE: CLEAR
AST SERPL-CCNC: 39 U/L
B-GLOBULIN MFR SERPL ELPH: 11.8 %
B-GLOBULIN SERPL ELPH-MCNC: 0.9 G/DL
BACTERIA: NEGATIVE /HPF
BILIRUB SERPL-MCNC: 1.1 MG/DL
BILIRUBIN URINE: NEGATIVE
BLOOD URINE: NEGATIVE
BUN SERPL-MCNC: 16 MG/DL
CALCIUM SERPL-MCNC: 9.7 MG/DL
CALCIUM SERPL-MCNC: 9.7 MG/DL
CAST: 0 /LPF
CHLORIDE SERPL-SCNC: 104 MMOL/L
CHOLEST SERPL-MCNC: 171 MG/DL
CO2 SERPL-SCNC: 20 MMOL/L
COLOR: YELLOW
CREAT SERPL-MCNC: 1.09 MG/DL
CREAT SPEC-SCNC: 148 MG/DL
CREAT/PROT UR: 0.1 RATIO
CYSTATIN C SERPL-MCNC: 1.05 MG/L
DEPRECATED KAPPA LC FREE/LAMBDA SER: 3.56 RATIO
EGFR: 82 ML/MIN/1.73M2
EPITHELIAL CELLS: 0 /HPF
FERRITIN SERPL-MCNC: 54 NG/ML
FOLATE SERPL-MCNC: >20 NG/ML
GAMMA GLOB FLD ELPH-MCNC: 1.8 G/DL
GAMMA GLOB MFR SERPL ELPH: 23.7 %
GFR/BSA.PRED SERPLBLD CYS-BASED-ARV: 76 ML/MIN/1.73M2
GLUCOSE QUALITATIVE U: NEGATIVE MG/DL
GLUCOSE SERPL-MCNC: 89 MG/DL
HCT VFR BLD CALC: 48.5 %
HCYS SERPL-MCNC: 8.7 UMOL/L
HDLC SERPL-MCNC: 40 MG/DL
HGB BLD-MCNC: 15.7 G/DL
IGA SER QL IEP: 203 MG/DL
IGG SER QL IEP: 2152 MG/DL
IGM SER QL IEP: 95 MG/DL
INTERPRETATION SERPL IEP-IMP: NORMAL
IRON SATN MFR SERPL: 41 %
IRON SERPL-MCNC: 139 UG/DL
KAPPA LC CSF-MCNC: 2.19 MG/DL
KAPPA LC SERPL-MCNC: 7.8 MG/DL
KETONES URINE: NEGATIVE MG/DL
LDLC SERPL CALC-MCNC: 110 MG/DL
LEUKOCYTE ESTERASE URINE: NEGATIVE
M PROTEIN SPEC IFE-MCNC: NORMAL
MAGNESIUM SERPL-MCNC: 2.1 MG/DL
MCHC RBC-ENTMCNC: 28.9 PG
MCHC RBC-ENTMCNC: 32.4 GM/DL
MCV RBC AUTO: 89.2 FL
MICROSCOPIC-UA: NORMAL
NITRITE URINE: NEGATIVE
NONHDLC SERPL-MCNC: 130 MG/DL
PARATHYROID HORMONE INTACT: 27 PG/ML
PH URINE: 5.5
PHOSPHATE SERPL-MCNC: 3.5 MG/DL
PLATELET # BLD AUTO: 275 K/UL
POTASSIUM SERPL-SCNC: 4.6 MMOL/L
PROT SERPL-MCNC: 7.8 G/DL
PROT UR-MCNC: 9 MG/DL
PROTEIN URINE: NEGATIVE MG/DL
RBC # BLD: 5.44 M/UL
RBC # FLD: 13.8 %
RED BLOOD CELLS URINE: 1 /HPF
SODIUM SERPL-SCNC: 139 MMOL/L
SPECIFIC GRAVITY URINE: 1.02
TIBC SERPL-MCNC: 339 UG/DL
TRIGL SERPL-MCNC: 113 MG/DL
UIBC SERPL-MCNC: 200 UG/DL
URATE SERPL-MCNC: 8.9 MG/DL
UROBILINOGEN URINE: 0.2 MG/DL
VIT B12 SERPL-MCNC: 919 PG/ML
WBC # FLD AUTO: 5.95 K/UL
WHITE BLOOD CELLS URINE: 0 /HPF

## 2024-04-19 LAB — ALP BONE SERPL-MCNC: 28.5 UG/L

## 2024-04-22 LAB — COLLAGEN CTX SERPL-MCNC: 348 PG/ML

## 2024-04-23 LAB — METHYLMALONATE SERPL-SCNC: 143 NMOL/L

## 2024-04-29 NOTE — H&P ADULT - PROBLEM SELECTOR PLAN 6
[facility] CLINICAL PHARMACY: ADHERENCE REVIEW  Identified care gap per [employer]: fills at [facility]: Statin adherence    ASSESSMENT  STATIN ADHERENCE    Insurance Records claims through 24 (Prior Year PDC = not reported; YTD PDC = FIRST FILL; Potential Fail Date: 24):   ROSUVASTATIN TAB 40MG last filled on 24 for 90 day supply. Next refill due: 3/31/24    Prescribed si tablet/capsule daily    Per med rec dispense should have 2 refills.     Lab Results   Component Value Date    CHOL 90 2024    TRIG 51 2024    HDL 40 2024    LDLCALC 40 2024     ALT   Date Value Ref Range Status   2024 19 10 - 40 U/L Final     AST   Date Value Ref Range Status   2024 23 15 - 37 U/L Final     The ASCVD Risk score (Yleena DEL REAL, et al., 2019) failed to calculate for the following reasons:    The valid total cholesterol range is 130 to 320 mg/dL     PLAN  Per insurer report, LIS-0 - co-pays are based on tiers and patient is subject to coverage gap.      The following are interventions that have been identified:   Patient OVERDUE refilling rosuvastatin and active on home medication list.   Patient eligible for 100 day supply of rosuvastatin    Attempting to reach patient to review.  Left message asking for return call.     Rx number: [id number]- refilled at [facility] will be ready for  tomorrow at noon.     Last Visit: 24  Next Visit: 24    Aimee Thornton PharmD, [facility]  [facility]  [facility] Clinical Pharmacist  Department: [phone]     For Pharmacy Admin Tracking Only    Program: [facility]  CPA in place:  No  Recommendation Provided To: Pharmacy: 1  Intervention Detail: Refill(s) Provided  Intervention Accepted By: Pharmacy: 1  Gap Closed?: No   Time Spent (min): 20     F: tolerating PO, no IVF  E: replete K<4, Mg<2  N: Dash/TLC    VTE Prophylaxis: Lovenox SubQ  C: Full Code  D: RMF Unclear etiology. Not likely to be drug reaction. Not pruritic. Patient w/ no eosinophilia on labs  - Can f/u w/ derm outpatient

## 2024-08-05 ENCOUNTER — APPOINTMENT (OUTPATIENT)
Dept: NEPHROLOGY | Facility: CLINIC | Age: 49
End: 2024-08-05

## 2024-08-05 PROBLEM — E78.00 ELEVATED LDL CHOLESTEROL LEVEL: Status: ACTIVE | Noted: 2024-08-05

## 2024-08-05 PROCEDURE — 36415 COLL VENOUS BLD VENIPUNCTURE: CPT

## 2024-08-05 PROCEDURE — 99214 OFFICE O/P EST MOD 30 MIN: CPT | Mod: 25

## 2024-08-05 NOTE — PHYSICAL EXAM
[General Appearance - Alert] : alert [General Appearance - In No Acute Distress] : in no acute distress [Auscultation Breath Sounds / Voice Sounds] : lungs were clear to auscultation bilaterally [Heart Rate And Rhythm] : heart rate was normal and rhythm regular [Heart Sounds] : normal S1 and S2 [Heart Sounds Gallop] : no gallops [Murmurs] : no murmurs [Heart Sounds Pericardial Friction Rub] : no pericardial rub [Full Pulse] : the pedal pulses are present [Edema] : there was no peripheral edema [Bowel Sounds] : normal bowel sounds [Abdomen Soft] : soft [Abdomen Tenderness] : non-tender [] : no hepato-splenomegaly [Abdomen Mass (___ Cm)] : no abdominal mass palpated [Abnormal Walk] : normal gait [Nail Clubbing] : no clubbing  or cyanosis of the fingernails [Musculoskeletal - Swelling] : no joint swelling seen [Motor Tone] : muscle strength and tone were normal [Oriented To Time, Place, And Person] : oriented to person, place, and time [Impaired Insight] : insight and judgment were intact [Affect] : the affect was normal [FreeTextEntry1] : + UMBILICAL HERNIA

## 2024-08-05 NOTE — PLAN
[TextEntry] : - Awaiting labs to recheck lipid panel - if elevated, will consult with Dr. Wolfe to discuss medication plans.  BP/HR taken in different positions (sitting standing and lying down) and d/w pt Self-monitoring at home advised i.e. BP, FS, etc. Medications updated Diet/healthy lifestyle counselling New labs ordered. Follow up in 6-8 weeks.

## 2024-08-05 NOTE — END OF VISIT
[TextEntry] : All medical record entries have been made by the scribe, JESSICA NEGRETE, at Dr. Devon Gutierrez direction and personally dictated by me on 08/05/24. I have received the chart and agree that the record accurately reflects my personal performance of the history, physical exam, assessment, and plan. I have also personally directed, reviewed and agreed with the chart.

## 2024-08-05 NOTE — HISTORY OF PRESENT ILLNESS
[FreeTextEntry1] : 49-year-old M w/ PMHX of abnormal transaminases, acute glomerulonephritis, hx of DELMA, arthritis of both hands, atypical chest pain, back pain, cellulitis, cervical spine disease, chronic renal impairments, drug induced myopathy, gout, h/o acute post-streptococcal glomerulonephritis, HTN, hyperuricemia, hypervolemia, labile BP, lateral epicondylitis of right elbow, lumbar herniated disc, neck pain, pain in thoracic spine, proteinuria, and weight gain. Last seen 24.  Patient feels fine generally. Denies new problems. He reports not taking allopurinol for months. Denies gout.  He reports completing both colonoscopy and endoscopy in the winter & says they were both unremarkable. Did not transfer documents to us, completed locally.  He lost 10lb since last seen in April. Says it was by consuming oatmeal in the morning to curb his cravings throughout the day.  His exercise comes from walking a lot at work. Does not go to the gym.   Denies family history of heart attack. He reports his father  of heart disease discovered at time of death. He  at 68 y/o. He reports his father struggled with alcohol intake.  As far as he knows, he currently does not have any problems. He also does not drink alcohol often.

## 2024-10-02 NOTE — ED ADULT NURSE NOTE - BREATH SOUNDS, LEFT
Admission documentation completed by the admit nurse. Home med rec complete. Pt did elect for meds to bed with [facility]. Pt reported trouble swallowing thin liquids. 4 Eyes and standing weight to be completed by the admitting floor nurse.      clear

## 2024-11-25 ENCOUNTER — APPOINTMENT (OUTPATIENT)
Dept: ORTHOPEDIC SURGERY | Facility: CLINIC | Age: 49
End: 2024-11-25
Payer: OTHER MISCELLANEOUS

## 2024-11-25 DIAGNOSIS — S80.01XA CONTUSION OF RIGHT KNEE, INITIAL ENCOUNTER: ICD-10-CM

## 2024-11-25 DIAGNOSIS — M17.31 UNILATERAL POST-TRAUMATIC OSTEOARTHRITIS, RIGHT KNEE: ICD-10-CM

## 2024-11-25 PROCEDURE — 73564 X-RAY EXAM KNEE 4 OR MORE: CPT | Mod: 50

## 2024-11-25 PROCEDURE — 99203 OFFICE O/P NEW LOW 30 MIN: CPT

## 2024-12-16 ENCOUNTER — APPOINTMENT (OUTPATIENT)
Dept: ORTHOPEDIC SURGERY | Facility: CLINIC | Age: 49
End: 2024-12-16
Payer: OTHER MISCELLANEOUS

## 2024-12-16 DIAGNOSIS — T14.8XXA OTHER INJURY OF UNSPECIFIED BODY REGION, INITIAL ENCOUNTER: ICD-10-CM

## 2024-12-16 DIAGNOSIS — M24.561 CONTRACTURE, RIGHT KNEE: ICD-10-CM

## 2024-12-16 DIAGNOSIS — M17.31 UNILATERAL POST-TRAUMATIC OSTEOARTHRITIS, RIGHT KNEE: ICD-10-CM

## 2024-12-16 DIAGNOSIS — M84.369A STRESS FRACTURE, UNSPECIFIED TIBIA AND FIBULA, INITIAL ENCOUNTER FOR FRACTURE: ICD-10-CM

## 2024-12-16 PROCEDURE — 73560 X-RAY EXAM OF KNEE 1 OR 2: CPT | Mod: RT

## 2024-12-16 PROCEDURE — 20610 DRAIN/INJ JOINT/BURSA W/O US: CPT | Mod: RT

## 2024-12-16 PROCEDURE — 99213 OFFICE O/P EST LOW 20 MIN: CPT | Mod: 25

## 2024-12-16 RX ORDER — MELOXICAM 15 MG/1
15 TABLET ORAL
Qty: 30 | Refills: 1 | Status: ACTIVE | COMMUNITY
Start: 2024-12-16 | End: 1900-01-01

## 2024-12-17 PROBLEM — M84.369A STRESS FRACTURE OF TIBIA: Status: ACTIVE | Noted: 2024-12-17

## 2024-12-17 RX ORDER — HYDROCODONE BITARTRATE AND ACETAMINOPHEN 5; 325 MG/1; MG/1
5-325 TABLET ORAL
Qty: 56 | Refills: 0 | Status: ACTIVE | COMMUNITY
Start: 2024-12-17 | End: 1900-01-01

## 2024-12-19 ENCOUNTER — APPOINTMENT (OUTPATIENT)
Dept: ORTHOPEDIC SURGERY | Facility: CLINIC | Age: 49
End: 2024-12-19

## 2025-01-08 ENCOUNTER — APPOINTMENT (OUTPATIENT)
Dept: ORTHOPEDIC SURGERY | Facility: CLINIC | Age: 50
End: 2025-01-08
Payer: OTHER MISCELLANEOUS

## 2025-01-08 DIAGNOSIS — M17.31 UNILATERAL POST-TRAUMATIC OSTEOARTHRITIS, RIGHT KNEE: ICD-10-CM

## 2025-01-08 DIAGNOSIS — M84.369A STRESS FRACTURE, UNSPECIFIED TIBIA AND FIBULA, INITIAL ENCOUNTER FOR FRACTURE: ICD-10-CM

## 2025-01-08 PROCEDURE — 99213 OFFICE O/P EST LOW 20 MIN: CPT

## 2025-02-05 ENCOUNTER — APPOINTMENT (OUTPATIENT)
Dept: ORTHOPEDIC SURGERY | Facility: CLINIC | Age: 50
End: 2025-02-05
Payer: OTHER MISCELLANEOUS

## 2025-02-05 DIAGNOSIS — M84.369A STRESS FRACTURE, UNSPECIFIED TIBIA AND FIBULA, INITIAL ENCOUNTER FOR FRACTURE: ICD-10-CM

## 2025-02-05 PROCEDURE — 99213 OFFICE O/P EST LOW 20 MIN: CPT

## 2025-02-19 ENCOUNTER — RX RENEWAL (OUTPATIENT)
Age: 50
End: 2025-02-19

## 2025-02-27 ENCOUNTER — APPOINTMENT (OUTPATIENT)
Dept: NEPHROLOGY | Facility: CLINIC | Age: 50
End: 2025-02-27

## 2025-03-06 ENCOUNTER — APPOINTMENT (OUTPATIENT)
Dept: ORTHOPEDIC SURGERY | Facility: CLINIC | Age: 50
End: 2025-03-06
Payer: OTHER MISCELLANEOUS

## 2025-03-06 DIAGNOSIS — M84.369A STRESS FRACTURE, UNSPECIFIED TIBIA AND FIBULA, INITIAL ENCOUNTER FOR FRACTURE: ICD-10-CM

## 2025-03-06 DIAGNOSIS — M17.31 UNILATERAL POST-TRAUMATIC OSTEOARTHRITIS, RIGHT KNEE: ICD-10-CM

## 2025-03-06 PROCEDURE — 99213 OFFICE O/P EST LOW 20 MIN: CPT

## 2025-03-13 ENCOUNTER — APPOINTMENT (OUTPATIENT)
Dept: ORTHOPEDIC SURGERY | Facility: CLINIC | Age: 50
End: 2025-03-13
Payer: OTHER MISCELLANEOUS

## 2025-03-13 PROCEDURE — 99213 OFFICE O/P EST LOW 20 MIN: CPT | Mod: 25

## 2025-03-13 PROCEDURE — 20610 DRAIN/INJ JOINT/BURSA W/O US: CPT | Mod: RT

## 2025-04-03 ENCOUNTER — APPOINTMENT (OUTPATIENT)
Dept: ORTHOPEDIC SURGERY | Facility: CLINIC | Age: 50
End: 2025-04-03
Payer: OTHER MISCELLANEOUS

## 2025-04-03 DIAGNOSIS — M84.369A STRESS FRACTURE, UNSPECIFIED TIBIA AND FIBULA, INITIAL ENCOUNTER FOR FRACTURE: ICD-10-CM

## 2025-04-03 DIAGNOSIS — M17.31 UNILATERAL POST-TRAUMATIC OSTEOARTHRITIS, RIGHT KNEE: ICD-10-CM

## 2025-04-03 PROCEDURE — 99213 OFFICE O/P EST LOW 20 MIN: CPT

## 2025-04-24 ENCOUNTER — APPOINTMENT (OUTPATIENT)
Dept: ORTHOPEDIC SURGERY | Facility: CLINIC | Age: 50
End: 2025-04-24
Payer: OTHER MISCELLANEOUS

## 2025-04-24 VITALS
OXYGEN SATURATION: 98 % | BODY MASS INDEX: 31.11 KG/M2 | WEIGHT: 245 LBS | DIASTOLIC BLOOD PRESSURE: 85 MMHG | HEIGHT: 74.5 IN | HEART RATE: 87 BPM | SYSTOLIC BLOOD PRESSURE: 144 MMHG

## 2025-04-24 PROCEDURE — 99213 OFFICE O/P EST LOW 20 MIN: CPT

## 2025-04-24 RX ORDER — MELOXICAM 15 MG/1
15 TABLET ORAL
Qty: 30 | Refills: 2 | Status: ACTIVE | COMMUNITY
Start: 2025-04-24 | End: 1900-01-01

## 2025-05-01 ENCOUNTER — APPOINTMENT (OUTPATIENT)
Dept: ORTHOPEDIC SURGERY | Facility: CLINIC | Age: 50
End: 2025-05-01
Payer: OTHER MISCELLANEOUS

## 2025-05-01 DIAGNOSIS — M17.31 UNILATERAL POST-TRAUMATIC OSTEOARTHRITIS, RIGHT KNEE: ICD-10-CM

## 2025-05-01 DIAGNOSIS — M84.369A STRESS FRACTURE, UNSPECIFIED TIBIA AND FIBULA, INITIAL ENCOUNTER FOR FRACTURE: ICD-10-CM

## 2025-05-01 PROCEDURE — 99213 OFFICE O/P EST LOW 20 MIN: CPT

## 2025-06-19 ENCOUNTER — APPOINTMENT (OUTPATIENT)
Dept: ORTHOPEDIC SURGERY | Facility: CLINIC | Age: 50
End: 2025-06-19
Payer: OTHER MISCELLANEOUS

## 2025-06-19 PROCEDURE — 99213 OFFICE O/P EST LOW 20 MIN: CPT

## 2025-07-24 ENCOUNTER — APPOINTMENT (OUTPATIENT)
Dept: ORTHOPEDIC SURGERY | Facility: CLINIC | Age: 50
End: 2025-07-24

## 2025-08-06 ENCOUNTER — APPOINTMENT (OUTPATIENT)
Dept: ORTHOPEDIC SURGERY | Facility: CLINIC | Age: 50
End: 2025-08-06
Payer: OTHER MISCELLANEOUS

## 2025-08-06 DIAGNOSIS — M17.31 UNILATERAL POST-TRAUMATIC OSTEOARTHRITIS, RIGHT KNEE: ICD-10-CM

## 2025-08-06 PROCEDURE — 99213 OFFICE O/P EST LOW 20 MIN: CPT

## 2025-08-06 RX ORDER — MELOXICAM 15 MG/1
15 TABLET ORAL DAILY
Qty: 30 | Refills: 3 | Status: ACTIVE | COMMUNITY
Start: 2025-08-06 | End: 2025-12-04

## 2025-09-10 ENCOUNTER — APPOINTMENT (OUTPATIENT)
Dept: NEPHROLOGY | Facility: CLINIC | Age: 50
End: 2025-09-10
Payer: COMMERCIAL

## 2025-09-10 VITALS — WEIGHT: 250 LBS | BODY MASS INDEX: 31.67 KG/M2

## 2025-09-10 VITALS — SYSTOLIC BLOOD PRESSURE: 138 MMHG | DIASTOLIC BLOOD PRESSURE: 96 MMHG

## 2025-09-10 VITALS — HEART RATE: 84 BPM | DIASTOLIC BLOOD PRESSURE: 94 MMHG | SYSTOLIC BLOOD PRESSURE: 132 MMHG

## 2025-09-10 VITALS — WEIGHT: 262 LBS | BODY MASS INDEX: 33.19 KG/M2

## 2025-09-10 VITALS — HEART RATE: 80 BPM | DIASTOLIC BLOOD PRESSURE: 96 MMHG | SYSTOLIC BLOOD PRESSURE: 141 MMHG

## 2025-09-10 DIAGNOSIS — I10 ESSENTIAL (PRIMARY) HYPERTENSION: ICD-10-CM

## 2025-09-10 DIAGNOSIS — M54.9 DORSALGIA, UNSPECIFIED: ICD-10-CM

## 2025-09-10 PROCEDURE — 99214 OFFICE O/P EST MOD 30 MIN: CPT

## 2025-09-10 RX ORDER — HYDROCHLOROTHIAZIDE 12.5 MG/1
12.5 TABLET ORAL
Qty: 90 | Refills: 3 | Status: ACTIVE | COMMUNITY
Start: 2025-09-10 | End: 1900-01-01